# Patient Record
Sex: MALE | Race: WHITE
[De-identification: names, ages, dates, MRNs, and addresses within clinical notes are randomized per-mention and may not be internally consistent; named-entity substitution may affect disease eponyms.]

---

## 2018-01-22 ENCOUNTER — HOSPITAL ENCOUNTER (INPATIENT)
Dept: HOSPITAL 88 - ER | Age: 48
LOS: 18 days | Discharge: HOME | DRG: 330 | End: 2018-02-09
Attending: INTERNAL MEDICINE | Admitting: INTERNAL MEDICINE
Payer: COMMERCIAL

## 2018-01-22 VITALS — SYSTOLIC BLOOD PRESSURE: 150 MMHG | DIASTOLIC BLOOD PRESSURE: 84 MMHG

## 2018-01-22 VITALS — DIASTOLIC BLOOD PRESSURE: 84 MMHG | SYSTOLIC BLOOD PRESSURE: 150 MMHG

## 2018-01-22 VITALS — SYSTOLIC BLOOD PRESSURE: 131 MMHG | DIASTOLIC BLOOD PRESSURE: 81 MMHG

## 2018-01-22 VITALS — BODY MASS INDEX: 24.83 KG/M2 | WEIGHT: 193.5 LBS | HEIGHT: 74 IN

## 2018-01-22 DIAGNOSIS — D62: ICD-10-CM

## 2018-01-22 DIAGNOSIS — E83.51: ICD-10-CM

## 2018-01-22 DIAGNOSIS — R18.8: ICD-10-CM

## 2018-01-22 DIAGNOSIS — C20: Primary | ICD-10-CM

## 2018-01-22 DIAGNOSIS — J90: ICD-10-CM

## 2018-01-22 DIAGNOSIS — K62.4: ICD-10-CM

## 2018-01-22 DIAGNOSIS — E88.09: ICD-10-CM

## 2018-01-22 DIAGNOSIS — I31.3: ICD-10-CM

## 2018-01-22 DIAGNOSIS — Z86.010: ICD-10-CM

## 2018-01-22 DIAGNOSIS — C78.6: ICD-10-CM

## 2018-01-22 DIAGNOSIS — D50.9: ICD-10-CM

## 2018-01-22 DIAGNOSIS — K51.212: ICD-10-CM

## 2018-01-22 LAB
ALBUMIN SERPL-MCNC: 2.5 G/DL (ref 3.5–5)
ALBUMIN/GLOB SERPL: 0.6 {RATIO} (ref 0.8–2)
ALP SERPL-CCNC: 80 IU/L (ref 40–150)
ALT SERPL-CCNC: 25 IU/L (ref 0–55)
ANION GAP SERPL CALC-SCNC: 14.1 MMOL/L (ref 8–16)
BACTERIA URNS QL MICRO: (no result) /HPF
BASOPHILS # BLD AUTO: 0 10*3/UL (ref 0–0.1)
BASOPHILS NFR BLD AUTO: 0.2 % (ref 0–1)
BILIRUB UR QL: NEGATIVE
BUN SERPL-MCNC: 16 MG/DL (ref 7–26)
BUN/CREAT SERPL: 17 (ref 6–25)
CALCIUM SERPL-MCNC: 8.1 MG/DL (ref 8.4–10.2)
CHLORIDE SERPL-SCNC: 99 MMOL/L (ref 98–107)
CLARITY UR: CLEAR
CO2 SERPL-SCNC: 23 MMOL/L (ref 22–29)
COLOR UR: (no result)
DEPRECATED INR PLAS: 1.12
DEPRECATED NEUTROPHILS # BLD AUTO: 8 10*3/UL (ref 2.1–6.9)
DEPRECATED RBC URNS MANUAL-ACNC: (no result) /HPF (ref 0–5)
EGFRCR SERPLBLD CKD-EPI 2021: > 60 ML/MIN (ref 60–?)
EOSINOPHIL # BLD AUTO: 0 10*3/UL (ref 0–0.4)
EOSINOPHIL NFR BLD AUTO: 0.4 % (ref 0–6)
EPI CELLS URNS QL MICRO: (no result) /LPF
ERYTHROCYTE [DISTWIDTH] IN CORD BLOOD: 24.2 % (ref 11.7–14.4)
GLOBULIN PLAS-MCNC: 4 G/DL (ref 2.3–3.5)
GLUCOSE SERPLBLD-MCNC: 104 MG/DL (ref 74–118)
HCT VFR BLD AUTO: 29.4 % (ref 38.2–49.6)
HGB BLD-MCNC: 7.2 G/DL (ref 14–18)
KETONES UR QL STRIP.AUTO: (no result)
LEUKOCYTE ESTERASE UR QL STRIP.AUTO: NEGATIVE
LIPASE SERPL-CCNC: 5 U/L (ref 8–78)
LYMPHOCYTES # BLD: 0.9 10*3/UL (ref 1–3.2)
LYMPHOCYTES NFR BLD AUTO: 8.6 % (ref 18–39.1)
MCH RBC QN AUTO: 15.5 PG (ref 28–32)
MCHC RBC AUTO-ENTMCNC: 24.5 G/DL (ref 31–35)
MCV RBC AUTO: 63.2 FL (ref 81–99)
MONOCYTES # BLD AUTO: 1.5 10*3/UL (ref 0.2–0.8)
MONOCYTES NFR BLD AUTO: 13.9 % (ref 4.4–11.3)
NEUTS SEG NFR BLD AUTO: 76.2 % (ref 38.7–80)
NITRITE UR QL STRIP.AUTO: NEGATIVE
PLATELET # BLD AUTO: 569 X10E3/UL (ref 140–360)
POTASSIUM SERPL-SCNC: 4.1 MMOL/L (ref 3.5–5.1)
PROT UR QL STRIP.AUTO: NEGATIVE
PROTHROMBIN TIME: 15 SECONDS (ref 11.9–14.5)
RBC # BLD AUTO: 4.65 X10E6/UL (ref 4.3–5.7)
SODIUM SERPL-SCNC: 132 MMOL/L (ref 136–145)
SP GR UR STRIP: 1.01 (ref 1.01–1.02)
UROBILINOGEN UR STRIP-MCNC: 0.2 MG/DL (ref 0.2–1)
WBC #/AREA URNS HPF: (no result) /HPF (ref 0–5)

## 2018-01-22 PROCEDURE — 80048 BASIC METABOLIC PNL TOTAL CA: CPT

## 2018-01-22 PROCEDURE — 74177 CT ABD & PELVIS W/CONTRAST: CPT

## 2018-01-22 PROCEDURE — 81001 URINALYSIS AUTO W/SCOPE: CPT

## 2018-01-22 PROCEDURE — 80053 COMPREHEN METABOLIC PANEL: CPT

## 2018-01-22 PROCEDURE — 83880 ASSAY OF NATRIURETIC PEPTIDE: CPT

## 2018-01-22 PROCEDURE — 74018 RADEX ABDOMEN 1 VIEW: CPT

## 2018-01-22 PROCEDURE — 86850 RBC ANTIBODY SCREEN: CPT

## 2018-01-22 PROCEDURE — 84466 ASSAY OF TRANSFERRIN: CPT

## 2018-01-22 PROCEDURE — 87045 FECES CULTURE AEROBIC BACT: CPT

## 2018-01-22 PROCEDURE — 85651 RBC SED RATE NONAUTOMATED: CPT

## 2018-01-22 PROCEDURE — 83540 ASSAY OF IRON: CPT

## 2018-01-22 PROCEDURE — 86920 COMPATIBILITY TEST SPIN: CPT

## 2018-01-22 PROCEDURE — 87086 URINE CULTURE/COLONY COUNT: CPT

## 2018-01-22 PROCEDURE — 88305 TISSUE EXAM BY PATHOLOGIST: CPT

## 2018-01-22 PROCEDURE — 99284 EMERGENCY DEPT VISIT MOD MDM: CPT

## 2018-01-22 PROCEDURE — 36415 COLL VENOUS BLD VENIPUNCTURE: CPT

## 2018-01-22 PROCEDURE — 85610 PROTHROMBIN TIME: CPT

## 2018-01-22 PROCEDURE — 85025 COMPLETE CBC W/AUTO DIFF WBC: CPT

## 2018-01-22 PROCEDURE — 82378 CARCINOEMBRYONIC ANTIGEN: CPT

## 2018-01-22 PROCEDURE — 83690 ASSAY OF LIPASE: CPT

## 2018-01-22 PROCEDURE — 45330 DIAGNOSTIC SIGMOIDOSCOPY: CPT

## 2018-01-22 PROCEDURE — 88307 TISSUE EXAM BY PATHOLOGIST: CPT

## 2018-01-22 PROCEDURE — 87493 C DIFF AMPLIFIED PROBE: CPT

## 2018-01-22 PROCEDURE — 88309 TISSUE EXAM BY PATHOLOGIST: CPT

## 2018-01-22 PROCEDURE — 77001 FLUOROGUIDE FOR VEIN DEVICE: CPT

## 2018-01-22 PROCEDURE — 85018 HEMOGLOBIN: CPT

## 2018-01-22 PROCEDURE — 88342 IMHCHEM/IMCYTCHM 1ST ANTB: CPT

## 2018-01-22 PROCEDURE — 86140 C-REACTIVE PROTEIN: CPT

## 2018-01-22 PROCEDURE — 36430 TRANSFUSION BLD/BLD COMPNT: CPT

## 2018-01-22 PROCEDURE — 96361 HYDRATE IV INFUSION ADD-ON: CPT

## 2018-01-22 PROCEDURE — 85014 HEMATOCRIT: CPT

## 2018-01-22 PROCEDURE — 82728 ASSAY OF FERRITIN: CPT

## 2018-01-22 PROCEDURE — 86900 BLOOD TYPING SEROLOGIC ABO: CPT

## 2018-01-22 RX ADMIN — METRONIDAZOLE SCH MLS/HR: 500 INJECTION, SOLUTION INTRAVENOUS at 17:52

## 2018-01-22 RX ADMIN — SODIUM CHLORIDE SCH MLS/HR: 9 INJECTION, SOLUTION INTRAVENOUS at 17:52

## 2018-01-22 RX ADMIN — TAZOBACTAM SODIUM AND PIPERACILLIN SODIUM SCH MLS/HR: 375; 3 INJECTION, SOLUTION INTRAVENOUS at 21:50

## 2018-01-22 RX ADMIN — METHYLPREDNISOLONE SODIUM SUCCINATE SCH MG: 40 INJECTION, POWDER, LYOPHILIZED, FOR SOLUTION INTRAMUSCULAR; INTRAVENOUS at 17:52

## 2018-01-22 RX ADMIN — CIPROFLOXACIN SCH MLS/HR: 2 INJECTION, SOLUTION INTRAVENOUS at 18:36

## 2018-01-22 NOTE — DIAGNOSTIC IMAGING REPORT
EXAM: CT Abdomen and Pelvis WITH contrast  

INDICATION:      

\S\abdominal pain, diverticulitis

\S\[id number]

\S\1203

\S\N 

COMPARISON: None available.

TECHNIQUE: Abdomen and pelvis were scanned utilizing a multidetector helical

scanner from the lung base to the pubic symphysis after administration of IV

contrast. Coronal and sagittal reformations were obtained. Routine protocol was

performed. Scan was performed when during portal venous phase.    

     IV CONTRAST: 100 mL of Isovue-370

     ORAL CONTRAST: Gastroview

            

COMPLICATIONS: None



RADIATION DOSE:

     Total DLP: 554.32 mGy*cm

     Estimated effective dose: (DLP x 0.015 x size factor) mSv

     CTDIvol has been reviewed. It is below the limits set by the Radiation

Protocol Committee (RPC).



FINDINGS:



LINES and TUBES: None.



LOWER THORAX:  Bibasilar atelectasis. Trace pericardial fluid.



HEPATOBILIARY: 0.9 cm left hepatic lobe cyst.  No focal hepatic lesions. No

biliary ductal dilation. 



GALLBLADDER: No radio-opaque stones or sludge.  No wall thickening.



SPLEEN: Splenomegaly. 



PANCREAS: No focal masses or ductal dilatation.  



ADRENALS: No adrenal nodules    



KIDNEYS/URETERS: Kidneys enhance symmetrically.  No hydronephrosis. No cystic

or solid mass lesions.  No stones.



GI TRACT: Significant distention of the entire colon with loss of haustral

markings, measuring up to 8.3 cm with abrupt narrowing at the high rectal area

(series 2, image 80). The remainder of the rectum also shows wall thickening.

Small hiatal hernia with contrast in distal esophagus, suggestive of

gastroesophageal reflux.





PELVIC ORGANS/BLADDER: Unremarkable.



LYMPH NODES: Prominent retroperitoneal lymph nodes. For example 1.1 cm and 1.2

cm left para-aortic lymph nodes (series 2, images 43 and 45). There are also

enlarged peripancreatic lymph nodes, measuring up to 1.1 cm (series 2, image

35).  Multiple mesorectal/presacral lymph nodes, the largest measuring 1.3 cm

(series 2, image 75 and series 301 image 73).



VESSELS: Unremarkable.



PERITONEUM / RETROPERITONEUM: No free air. Trace abdominal ascites.



BONES: Bilateral L5 pars defects with grade 1 L5-S1 spondylolisthesis.



SOFT TISSUES: Unremarkable.            



IMPRESSION: 

1.  Significant distention of entire colon with narrowing at the high rectal

area. Differentials include toxic megacolon (considering history of

inflammatory bowel disease), colonic ileus, and partial obstruction from rectal

mass. There is rectal wall thickening and suspected mesorectal and

retroperitoneal lymph nodes.

2.  Trace abdominal ascites and pericardial effusion.



Findings discussed with Dr. Pittman at 12:50 PM, on 1/22/2018.



Signed by: Dr. Brock Dunn MD on 1/22/2018 12:53 PM

## 2018-01-22 NOTE — CONSULTATION
DATE OF CONSULTATION:  January 22, 2018 



GASTROENTEROLOGY CONSULTATION 



REFERRING PHYSICIAN:  Dr. Pittman 



REASON FOR CONSULTATION:  Colitis, abnormal CT of colon.  



HISTORY OF PRESENT ILLNESS:  Mr. Ruiz is a very pleasant 47-year-old man 

with longstanding history of ulcerative colitis.  He previously followed 

with Dr. Corral and was last seen in 2014.  At that time, he had a 

colonoscopy, which could not pass roughly 50 cm due to a stricture.  

Biopsies were concerning for possible high-grade dysplasia.  He was 

referred for colectomy.  He says he saw oncologist, as well as a colorectal 

surgeon and they recommended monitoring as a possibility per his report.  Since 
then, he 

has been off of medications and has not followed up with his 

gastroenterologist.  He has been managing just by diet.  He reports his 

baseline is usually 3 to 4 stools daily, but he has never had blood in his 

stool.  His abdomen became swollen for 3 days the 1st week of December, but 

resumed its normal habitus spontaneously.  He  was doing well per his 

report until roughly a week ago.  Last week, his abdomen became swollen.  

He had developed some lower abdominal pain, which was mild roughly 3 to 4 

out of 10 on the pain scale.  He says he did not feel pain acutely.  He has 

been eating very little and has been having fewer bowel movements well.  

Stools are liquid.  Still passes gas.  He thinks he has lost weight maybe about 
10 lbs in the 

past week.  He denies any fevers, chills or sweats.  He has not been having 

any significant diarrhea.  He, otherwise, has been in his usual self 

without any extra intestinal manifestations of inflammatory bowel disease.  





PAST MEDICAL HISTORY:  Ulcerative colitis diagnosed when he was 17.  He 

believed at first it was limited to the left side and then, later became 

pancolitis.  He has been on prednisone, Asacol, and Rowasa enemas in the past, 

but nothing since 2014.  He is near sighted.



SURGICAL HISTORY:  Includes cataract in the left side and traumatic detached 
retina 

on the right.



SOCIAL HISTORY:  No alcohol, tobacco or illicit substance use.  He is 

employed as a  in oil and gas.



FAMILY HISTORY:  Positive for breast cancer in his mother and as well as 

some sort of digestive issue in a great aunt. 



MEDICATION AND ALLERGIES:  Reviewed.  Please see MAR medication 

reconciliation form.  



REVIEW OF SYSTEMS:  Twelve-system review is positive for that mentioned in 

HPI, otherwise unremarkable.  



PHYSICAL EXAM  

GENERAL:  He is pleasant, alert, oriented, in no acute distress. 

HEENT:  Pupils equal, round, reactive to light. 

NECK:  Supple. 

LUNGS:  Clear. 

CARDIOVASCULAR:  S1 S2. 

ABDOMEN:  Soft.  Mildly distended.  He has some high-pitched tinkling 

sounds.  He is a little bit tender in the lower abdomen, particularly on 

the left lower quadrant, but there is no rebound, guarding or mass. 

EXTREMITIES:  No clubbing, cyanosis or edema.  

PSYCH:  Calm, cooperative. 

NEUROLOGIC:  Nonfocal.

HEM/ONC:  No bruising or adenopathy.  



Electronic health records reviewed for laboratory and radiologic studies as 

well as history. 



ASSESSMENT

1. It appears to be a proximal rectal or distal sigmoid strictures, 

likely the one that was encountered on previous colonoscopy.  Question 

is how much of this is related to inflammatory status, fibrostenotic 

scar tissue or even underlying malignancy given the history of 

high-grade dysplasia.  His presentation does not seem to be that of 

Clostridium difficile or toxic megacolon.  It is probably a more chronic 
progressive obstruction.

At the current time, will go 

ahead and place him on antibiotics, as well as steroids to decrease any 

inflammation that may be related and responsive from the ulcerative 

colitis.  He will need a colonoscopy.  If he can tolerate a slow bowel 

prep over a couple of days would be ideal.  Will plan for Dr. Ortiz 

to follow with us.  Additionally, will go ahead and check inflammatory 

markers and stool studies.  If he is not passing sufficient stools and gas

will need to be NPO with just enema prep.  

2. Anemia, microcytic:  I suspect he has iron-deficiency anemia.  Will 

check iron studies.  He may need IV replacement.  



Thank you very much for asking me to see Mr. Ruiz.  Any questions or 

concerns, please do not hesitate to contact me.  Will follow with you.









DD:  01/22/2018 16:53

DT:  01/22/2018 20:29

Job#:  [id number] CQ







MTDD

## 2018-01-23 VITALS — DIASTOLIC BLOOD PRESSURE: 74 MMHG | SYSTOLIC BLOOD PRESSURE: 138 MMHG

## 2018-01-23 VITALS — SYSTOLIC BLOOD PRESSURE: 141 MMHG | DIASTOLIC BLOOD PRESSURE: 76 MMHG

## 2018-01-23 VITALS — DIASTOLIC BLOOD PRESSURE: 70 MMHG | SYSTOLIC BLOOD PRESSURE: 143 MMHG

## 2018-01-23 VITALS — DIASTOLIC BLOOD PRESSURE: 79 MMHG | SYSTOLIC BLOOD PRESSURE: 148 MMHG

## 2018-01-23 VITALS — DIASTOLIC BLOOD PRESSURE: 69 MMHG | SYSTOLIC BLOOD PRESSURE: 131 MMHG

## 2018-01-23 VITALS — SYSTOLIC BLOOD PRESSURE: 156 MMHG | DIASTOLIC BLOOD PRESSURE: 82 MMHG

## 2018-01-23 VITALS — DIASTOLIC BLOOD PRESSURE: 74 MMHG | SYSTOLIC BLOOD PRESSURE: 151 MMHG

## 2018-01-23 LAB
ALBUMIN SERPL-MCNC: 1.8 G/DL (ref 3.5–5)
ALBUMIN/GLOB SERPL: 0.6 {RATIO} (ref 0.8–2)
ALP SERPL-CCNC: 69 IU/L (ref 40–150)
ALT SERPL-CCNC: 24 IU/L (ref 0–55)
ANION GAP SERPL CALC-SCNC: 13.2 MMOL/L (ref 8–16)
ANISOCYTOSIS BLD QL SMEAR: (no result)
BASOPHILS # BLD AUTO: 0 10*3/UL (ref 0–0.1)
BASOPHILS NFR BLD AUTO: 0.2 % (ref 0–1)
BUN SERPL-MCNC: 14 MG/DL (ref 7–26)
BUN/CREAT SERPL: 18 (ref 6–25)
CALCIUM SERPL-MCNC: 7.5 MG/DL (ref 8.4–10.2)
CHLORIDE SERPL-SCNC: 104 MMOL/L (ref 98–107)
CO2 SERPL-SCNC: 21 MMOL/L (ref 22–29)
DEPRECATED NEUTROPHILS # BLD AUTO: 4.3 10*3/UL (ref 2.1–6.9)
EGFRCR SERPLBLD CKD-EPI 2021: > 60 ML/MIN (ref 60–?)
ELLIPTOCYTES BLD QL SMEAR: SLIGHT
EOSINOPHIL # BLD AUTO: 0 10*3/UL (ref 0–0.4)
EOSINOPHIL NFR BLD AUTO: 0.5 % (ref 0–6)
EOSINOPHIL NFR BLD MANUAL: 1 % (ref 0–7)
ERYTHROCYTE [DISTWIDTH] IN CORD BLOOD: 23.3 % (ref 11.7–14.4)
FERRITIN SERPL-MCNC: 7.98 NG/ML (ref 21.81–274.66)
GLOBULIN PLAS-MCNC: 3.2 G/DL (ref 2.3–3.5)
GLUCOSE SERPLBLD-MCNC: 103 MG/DL (ref 74–118)
HCT VFR BLD AUTO: 22.5 % (ref 38.2–49.6)
HCT VFR BLD AUTO: 23 % (ref 38.2–49.6)
HGB BLD-MCNC: 5.6 G/DL (ref 14–18)
HGB BLD-MCNC: 5.7 G/DL (ref 14–18)
HYPOCHROMIA BLD QL SMEAR: (no result)
IRON SATN MFR SERPL: 4 % (ref 15–50)
IRON SERPL-MCNC: 8 UG/DL (ref 65–175)
LYMPHOCYTES # BLD: 0.8 10*3/UL (ref 1–3.2)
LYMPHOCYTES NFR BLD AUTO: 12.7 % (ref 18–39.1)
LYMPHOCYTES NFR BLD MANUAL: 9 % (ref 19–48)
MCH RBC QN AUTO: 15.6 PG (ref 28–32)
MCHC RBC AUTO-ENTMCNC: 24.9 G/DL (ref 31–35)
MCV RBC AUTO: 62.8 FL (ref 81–99)
MONOCYTES # BLD AUTO: 1.3 10*3/UL (ref 0.2–0.8)
MONOCYTES NFR BLD AUTO: 19.6 % (ref 4.4–11.3)
MONOCYTES NFR BLD MANUAL: 17 % (ref 3.4–9)
NEUTS BAND NFR BLD MANUAL: 8 %
NEUTS SEG NFR BLD AUTO: 66.5 % (ref 38.7–80)
NEUTS SEG NFR BLD MANUAL: 63 % (ref 40–74)
PLAT MORPH BLD: (no result)
PLATELET # BLD AUTO: 376 X10E3/UL (ref 140–360)
PLATELET # BLD EST: ADEQUATE 10*3/UL
POIKILOCYTOSIS BLD QL SMEAR: SLIGHT
POTASSIUM SERPL-SCNC: 4.2 MMOL/L (ref 3.5–5.1)
RBC # BLD AUTO: 3.58 X10E6/UL (ref 4.3–5.7)
RBC MORPH BLD: (no result)
SODIUM SERPL-SCNC: 134 MMOL/L (ref 136–145)
TIBC SERPL-MCNC: 179 UG/DL (ref 261–478)
TRANSFERRIN SERPL-MCNC: 128 MG/DL (ref 174–364)

## 2018-01-23 PROCEDURE — 30233N1 TRANSFUSION OF NONAUTOLOGOUS RED BLOOD CELLS INTO PERIPHERAL VEIN, PERCUTANEOUS APPROACH: ICD-10-PCS | Performed by: SURGERY

## 2018-01-23 RX ADMIN — SODIUM CHLORIDE SCH MLS/HR: 9 INJECTION, SOLUTION INTRAVENOUS at 06:05

## 2018-01-23 RX ADMIN — SODIUM CHLORIDE SCH MLS/HR: 9 INJECTION, SOLUTION INTRAVENOUS at 22:05

## 2018-01-23 RX ADMIN — CIPROFLOXACIN SCH MLS/HR: 2 INJECTION, SOLUTION INTRAVENOUS at 04:37

## 2018-01-23 RX ADMIN — METRONIDAZOLE SCH MLS/HR: 500 INJECTION, SOLUTION INTRAVENOUS at 00:40

## 2018-01-23 RX ADMIN — METRONIDAZOLE SCH MLS/HR: 500 INJECTION, SOLUTION INTRAVENOUS at 17:02

## 2018-01-23 RX ADMIN — CIPROFLOXACIN SCH MLS/HR: 2 INJECTION, SOLUTION INTRAVENOUS at 17:15

## 2018-01-23 RX ADMIN — METHYLPREDNISOLONE SODIUM SUCCINATE SCH MG: 40 INJECTION, POWDER, LYOPHILIZED, FOR SOLUTION INTRAMUSCULAR; INTRAVENOUS at 00:40

## 2018-01-23 RX ADMIN — METHYLPREDNISOLONE SODIUM SUCCINATE SCH MG: 40 INJECTION, POWDER, LYOPHILIZED, FOR SOLUTION INTRAMUSCULAR; INTRAVENOUS at 17:55

## 2018-01-23 RX ADMIN — SODIUM CHLORIDE SCH MLS/HR: 9 INJECTION, SOLUTION INTRAVENOUS at 03:37

## 2018-01-23 RX ADMIN — METHYLPREDNISOLONE SODIUM SUCCINATE SCH MG: 40 INJECTION, POWDER, LYOPHILIZED, FOR SOLUTION INTRAMUSCULAR; INTRAVENOUS at 06:12

## 2018-01-23 RX ADMIN — TAZOBACTAM SODIUM AND PIPERACILLIN SODIUM SCH MLS/HR: 375; 3 INJECTION, SOLUTION INTRAVENOUS at 06:12

## 2018-01-23 RX ADMIN — METHYLPREDNISOLONE SODIUM SUCCINATE SCH MG: 40 INJECTION, POWDER, LYOPHILIZED, FOR SOLUTION INTRAMUSCULAR; INTRAVENOUS at 23:45

## 2018-01-23 RX ADMIN — METRONIDAZOLE SCH MLS/HR: 500 INJECTION, SOLUTION INTRAVENOUS at 08:23

## 2018-01-23 RX ADMIN — TAZOBACTAM SODIUM AND PIPERACILLIN SODIUM SCH MLS/HR: 375; 3 INJECTION, SOLUTION INTRAVENOUS at 21:37

## 2018-01-23 RX ADMIN — METHYLPREDNISOLONE SODIUM SUCCINATE SCH MG: 40 INJECTION, POWDER, LYOPHILIZED, FOR SOLUTION INTRAMUSCULAR; INTRAVENOUS at 13:13

## 2018-01-23 RX ADMIN — SODIUM CHLORIDE SCH MLS/HR: 9 INJECTION, SOLUTION INTRAVENOUS at 14:39

## 2018-01-23 RX ADMIN — TAZOBACTAM SODIUM AND PIPERACILLIN SODIUM SCH MLS/HR: 375; 3 INJECTION, SOLUTION INTRAVENOUS at 13:13

## 2018-01-23 NOTE — DIAGNOSTIC IMAGING REPORT
PROCEDURE:X-RAY ABDOMEN - KUB

 

COMPARISON:CT abdomen and pelvis 1/22/2018.

 

INDICATIONS:ABDOMINAL PAIN

 

FINDINGS:

Dilated fluid filled loops of colon. No air is present in the rectum. 

There are no calcifications projected over the renal shadows, expected 

course of the ureters or bladder. There are no acute osseous 

abnormalities. The lung bases are clear.

 

CONCLUSION:

Dilated fluid filled loops of colon may represent a distal colon 

obstruction.

 

Dictated by:  Telly Calvillo M.D. on 1/23/2018 at 7:53     

Electronically approved by:  Telly Calvillo M.D. on 1/23/2018 at 7:53

## 2018-01-24 VITALS — DIASTOLIC BLOOD PRESSURE: 75 MMHG | SYSTOLIC BLOOD PRESSURE: 139 MMHG

## 2018-01-24 VITALS — DIASTOLIC BLOOD PRESSURE: 75 MMHG | SYSTOLIC BLOOD PRESSURE: 151 MMHG

## 2018-01-24 VITALS — DIASTOLIC BLOOD PRESSURE: 84 MMHG | SYSTOLIC BLOOD PRESSURE: 149 MMHG

## 2018-01-24 VITALS — SYSTOLIC BLOOD PRESSURE: 137 MMHG | DIASTOLIC BLOOD PRESSURE: 78 MMHG

## 2018-01-24 VITALS — SYSTOLIC BLOOD PRESSURE: 142 MMHG | DIASTOLIC BLOOD PRESSURE: 73 MMHG

## 2018-01-24 VITALS — DIASTOLIC BLOOD PRESSURE: 78 MMHG | SYSTOLIC BLOOD PRESSURE: 147 MMHG

## 2018-01-24 LAB
ANION GAP SERPL CALC-SCNC: 11.6 MMOL/L (ref 8–16)
ANISOCYTOSIS BLD QL SMEAR: (no result)
BASOPHILS # BLD AUTO: 0 10*3/UL (ref 0–0.1)
BASOPHILS NFR BLD AUTO: 0.2 % (ref 0–1)
BUN SERPL-MCNC: 13 MG/DL (ref 7–26)
BUN/CREAT SERPL: 16 (ref 6–25)
CALCIUM SERPL-MCNC: 8 MG/DL (ref 8.4–10.2)
CHLORIDE SERPL-SCNC: 107 MMOL/L (ref 98–107)
CO2 SERPL-SCNC: 20 MMOL/L (ref 22–29)
DEPRECATED NEUTROPHILS # BLD AUTO: 5 10*3/UL (ref 2.1–6.9)
EGFRCR SERPLBLD CKD-EPI 2021: > 60 ML/MIN (ref 60–?)
ELLIPTOCYTES BLD QL SMEAR: SLIGHT
EOSINOPHIL # BLD AUTO: 0 10*3/UL (ref 0–0.4)
EOSINOPHIL NFR BLD AUTO: 0.3 % (ref 0–6)
ERYTHROCYTE [DISTWIDTH] IN CORD BLOOD: 25.8 % (ref 11.7–14.4)
GLUCOSE SERPLBLD-MCNC: 113 MG/DL (ref 74–118)
HCT VFR BLD AUTO: 24.7 % (ref 38.2–49.6)
HCT VFR BLD AUTO: 25.5 % (ref 38.2–49.6)
HCT VFR BLD AUTO: 29.3 % (ref 38.2–49.6)
HGB BLD-MCNC: 6.5 G/DL (ref 14–18)
HGB BLD-MCNC: 6.6 G/DL (ref 14–18)
HGB BLD-MCNC: 7.7 G/DL (ref 14–18)
HOWELL-JOLLY BOD BLD QL SMEAR: (no result)
HYPOCHROMIA BLD QL SMEAR: (no result)
LYMPHOCYTES # BLD: 0.6 10*3/UL (ref 1–3.2)
LYMPHOCYTES NFR BLD AUTO: 8.6 % (ref 18–39.1)
MCH RBC QN AUTO: 17.3 PG (ref 28–32)
MCHC RBC AUTO-ENTMCNC: 25.9 G/DL (ref 31–35)
MCV RBC AUTO: 66.9 FL (ref 81–99)
MONOCYTES # BLD AUTO: 0.8 10*3/UL (ref 0.2–0.8)
MONOCYTES NFR BLD AUTO: 12.6 % (ref 4.4–11.3)
NEUTS SEG NFR BLD AUTO: 77.1 % (ref 38.7–80)
PLAT MORPH BLD: (no result)
PLATELET # BLD AUTO: 342 X10E3/UL (ref 140–360)
PLATELET # BLD EST: ADEQUATE 10*3/UL
POTASSIUM SERPL-SCNC: 4.6 MMOL/L (ref 3.5–5.1)
RBC # BLD AUTO: 3.81 X10E6/UL (ref 4.3–5.7)
RBC MORPH BLD: (no result)
SODIUM SERPL-SCNC: 134 MMOL/L (ref 136–145)

## 2018-01-24 PROCEDURE — 0DBF8ZX EXCISION OF RIGHT LARGE INTESTINE, VIA NATURAL OR ARTIFICIAL OPENING ENDOSCOPIC, DIAGNOSTIC: ICD-10-PCS | Performed by: SURGERY

## 2018-01-24 PROCEDURE — 0DBP8ZX EXCISION OF RECTUM, VIA NATURAL OR ARTIFICIAL OPENING ENDOSCOPIC, DIAGNOSTIC: ICD-10-PCS

## 2018-01-24 RX ADMIN — METHYLPREDNISOLONE SODIUM SUCCINATE SCH MG: 40 INJECTION, POWDER, LYOPHILIZED, FOR SOLUTION INTRAMUSCULAR; INTRAVENOUS at 05:24

## 2018-01-24 RX ADMIN — CIPROFLOXACIN SCH MLS/HR: 2 INJECTION, SOLUTION INTRAVENOUS at 16:40

## 2018-01-24 RX ADMIN — METRONIDAZOLE SCH MLS/HR: 500 INJECTION, SOLUTION INTRAVENOUS at 16:40

## 2018-01-24 RX ADMIN — METRONIDAZOLE SCH MLS/HR: 500 INJECTION, SOLUTION INTRAVENOUS at 09:56

## 2018-01-24 RX ADMIN — METHYLPREDNISOLONE SODIUM SUCCINATE SCH MG: 40 INJECTION, POWDER, LYOPHILIZED, FOR SOLUTION INTRAMUSCULAR; INTRAVENOUS at 18:38

## 2018-01-24 RX ADMIN — TAZOBACTAM SODIUM AND PIPERACILLIN SODIUM SCH MLS/HR: 375; 3 INJECTION, SOLUTION INTRAVENOUS at 14:00

## 2018-01-24 RX ADMIN — CIPROFLOXACIN SCH MLS/HR: 2 INJECTION, SOLUTION INTRAVENOUS at 05:15

## 2018-01-24 RX ADMIN — TAZOBACTAM SODIUM AND PIPERACILLIN SODIUM SCH MLS/HR: 375; 3 INJECTION, SOLUTION INTRAVENOUS at 06:09

## 2018-01-24 RX ADMIN — METRONIDAZOLE SCH MLS/HR: 500 INJECTION, SOLUTION INTRAVENOUS at 00:06

## 2018-01-24 RX ADMIN — TAZOBACTAM SODIUM AND PIPERACILLIN SODIUM SCH MLS/HR: 375; 3 INJECTION, SOLUTION INTRAVENOUS at 21:02

## 2018-01-24 RX ADMIN — METHYLPREDNISOLONE SODIUM SUCCINATE SCH MG: 40 INJECTION, POWDER, LYOPHILIZED, FOR SOLUTION INTRAMUSCULAR; INTRAVENOUS at 12:00

## 2018-01-24 RX ADMIN — SODIUM CHLORIDE SCH MLS/HR: 9 INJECTION, SOLUTION INTRAVENOUS at 06:05

## 2018-01-24 RX ADMIN — SODIUM CHLORIDE SCH MLS/HR: 9 INJECTION, SOLUTION INTRAVENOUS at 14:05

## 2018-01-24 NOTE — CONSULTATION
DATE OF CONSULTATION:  January 23, 2018 



CONSULTATION TO:  Dr. Bruno Corral.



HISTORY OF PRESENT ILLNESS:  Mr. Ruiz is a 47-year-old white male who has 

been referred to me for evaluation of anemia.  The patient had presented 

with abdominal pain and distention, subsequently was seen by Dr. Potter. 

 Was found to be very anemic.  History of ulcerative colitis.  Dr. Peter Corral had followed him, the last was in 2014.  The patient subsequently 

was sent to me on 07/24/2014.  The patient was found to have an adenoma 

high-grade dysplasia when he had a colonoscopy on 06/20/2014.  The findings 

were also of ulcerative colitis, descending colon stricture, large 

pseudopolyps, ulcerative left colon, and ulcerative proctitis.  The biopsy 

were obtained.  The patient at that time was treated with Asacol, Canasa, 

and Levaquin.  The patient at that time had rectal bleed and diarrhea.  

Subsequently, all the records provided by Dr. Peter Corral were reviewed. 

 The patient was suggested "agree completely with the colorectal surgeon 

that a total colectomy is warranted in a setting of ulcerative colitis 

since age 17."  Psychologically, it is hard for him to accept the drastic 

change in his lifestyle.  He wants a surveillance colonoscopy in 3 months 

"to wrap my head around what is coming."  Prolonged discussion was carried 

out.  Encouraged him to read more above the disease process to understand 

the recommendations of the physicians.  The patient was suggested to see me 

back after seeing the surgeon in 6 months.  However the patient never came 

back.



REVIEW OF SYSTEMS

HEENT:  Normal. 

CARDIAC:  Normal. 

RESPIRATORY:  Normal.

GI:  History of ulcerative colitis, history of a polyp with high-grade 

dysplasia discovered in 2014, lost for followup from 07/24/2014 until he 

lands up here. 

:  Normal. 

MUSCULOSKELETAL:  Normal. 

SKIN:  Normal.

BREASTS:  Normal.

NEUROENDOCRINE:  Normal.

HEMATOLOGY:  The patient is very anemic at this time. 

 

PHYSICAL EXAMINATION

GENERAL:  Moderately built male, very anemic.

NECK:  No palpable adenopathy. 

HEART:  Within normal limits. 

LUNGS:  Clear. 

ABDOMEN:  Distended. 

RECTAL:  Exam deferred. 

CENTRAL NERVOUS SYSTEM:  Essentially normal. 

EXTREMITIES:  Essentially normal. 



LABORATORY DATA:  Lab investigations of interest which showed a hemoglobin 

of 7.2, hematocrit 29.4, white count 10,500, platelets were reported high 

at 559,000, MCV 63.2, MCHC 24.5, and RDW high at 24.2.  Chemistry shows a 

sodium of 134, potassium 4.2, chloride 104, CO2 21, BUN 14, creatinine 0.7, 

calcium low at 7.5, iron low at 8, iron binding capacity low at 179, 

saturation low at 14, transferrin 128 low, and ferritin low at 7.98.  Total 

protein is 5.  Albumin low at 1.8.  Globulin 3.2.



IMAGING:  The patient had imaging CAT scan of the abdomen and pelvis is not 

much different from the one I have in my records from 07/22/2014.  The 

patient has significant distention of the entire colon with narrowing the 

high rectal area.  It is contemplated that this could be because off 

partial obstruction from a rectal mass, rectal thickening, suspected 

mesorectal and retroperitoneal lymph nodes, trace abdominal ascites, and 

pericardial effusion.  There was an abrupt narrowing at the high rectal 

area according to Dr. Brock Dunn, this is dated 01/22/2018.



IMPRESSION

1. History of ulcerative colitis.

2. Distention of colon, possible stricture, rule out malignancy.

3. History of dysplastic changes in colon in the past.

4. Retroperitoneal paraaortic peripancreatic mesorectal presacral 

adenopathy.

5. Rectal wall thickening.

6. Abdominal ascites.

7. Pericardial effusion.

8. Iron deficiency anemia.

9. Hypoalbuminemia.

10. Hypocalcemia. 



PLAN, COMMENTS, AND SUGGESTIONS:  Suggest colonoscopy.  Suggest biopsy.  

Suggest blood .  Suggest INFeD, surgical consultation, and tumor markers.







DD:  01/24/2018 08:19

DT:  01/24/2018 09:39

Job#:  [id number] SAK



cc:Dr. Peter Ortiz

## 2018-01-25 VITALS — DIASTOLIC BLOOD PRESSURE: 83 MMHG | SYSTOLIC BLOOD PRESSURE: 149 MMHG

## 2018-01-25 VITALS — SYSTOLIC BLOOD PRESSURE: 146 MMHG | DIASTOLIC BLOOD PRESSURE: 85 MMHG

## 2018-01-25 VITALS — DIASTOLIC BLOOD PRESSURE: 78 MMHG | SYSTOLIC BLOOD PRESSURE: 145 MMHG

## 2018-01-25 VITALS — DIASTOLIC BLOOD PRESSURE: 76 MMHG | SYSTOLIC BLOOD PRESSURE: 135 MMHG

## 2018-01-25 VITALS — DIASTOLIC BLOOD PRESSURE: 80 MMHG | SYSTOLIC BLOOD PRESSURE: 135 MMHG

## 2018-01-25 VITALS — DIASTOLIC BLOOD PRESSURE: 81 MMHG | SYSTOLIC BLOOD PRESSURE: 145 MMHG

## 2018-01-25 VITALS — SYSTOLIC BLOOD PRESSURE: 145 MMHG | DIASTOLIC BLOOD PRESSURE: 81 MMHG

## 2018-01-25 LAB
ANISOCYTOSIS BLD QL SMEAR: (no result)
BASOPHILS # BLD AUTO: 0 10*3/UL (ref 0–0.1)
BASOPHILS NFR BLD AUTO: 0.1 % (ref 0–1)
DEPRECATED NEUTROPHILS # BLD AUTO: 5.3 10*3/UL (ref 2.1–6.9)
ELLIPTOCYTES BLD QL SMEAR: (no result)
EOSINOPHIL # BLD AUTO: 0 10*3/UL (ref 0–0.4)
EOSINOPHIL NFR BLD AUTO: 0.4 % (ref 0–6)
ERYTHROCYTE [DISTWIDTH] IN CORD BLOOD: 27.1 % (ref 11.7–14.4)
HCT VFR BLD AUTO: 28.5 % (ref 38.2–49.6)
HGB BLD-MCNC: 7.6 G/DL (ref 14–18)
HYPOCHROMIA BLD QL SMEAR: (no result)
LYMPHOCYTES # BLD: 0.6 10*3/UL (ref 1–3.2)
LYMPHOCYTES NFR BLD AUTO: 9.2 % (ref 18–39.1)
MCH RBC QN AUTO: 18.4 PG (ref 28–32)
MCHC RBC AUTO-ENTMCNC: 26.7 G/DL (ref 31–35)
MCV RBC AUTO: 69 FL (ref 81–99)
MONOCYTES # BLD AUTO: 0.9 10*3/UL (ref 0.2–0.8)
MONOCYTES NFR BLD AUTO: 12.5 % (ref 4.4–11.3)
NEUTS SEG NFR BLD AUTO: 76.6 % (ref 38.7–80)
PLAT MORPH BLD: (no result)
PLATELET # BLD AUTO: 318 X10E3/UL (ref 140–360)
PLATELET # BLD EST: ADEQUATE 10*3/UL
RBC # BLD AUTO: 4.13 X10E6/UL (ref 4.3–5.7)
RBC MORPH BLD: (no result)

## 2018-01-25 RX ADMIN — METRONIDAZOLE SCH MLS/HR: 500 INJECTION, SOLUTION INTRAVENOUS at 00:27

## 2018-01-25 RX ADMIN — TAZOBACTAM SODIUM AND PIPERACILLIN SODIUM SCH MLS/HR: 375; 3 INJECTION, SOLUTION INTRAVENOUS at 21:29

## 2018-01-25 RX ADMIN — SODIUM CHLORIDE SCH MLS/HR: 9 INJECTION, SOLUTION INTRAVENOUS at 05:53

## 2018-01-25 RX ADMIN — SODIUM CHLORIDE SCH MLS/HR: 9 INJECTION, SOLUTION INTRAVENOUS at 14:05

## 2018-01-25 RX ADMIN — CIPROFLOXACIN SCH MLS/HR: 2 INJECTION, SOLUTION INTRAVENOUS at 16:25

## 2018-01-25 RX ADMIN — METHYLPREDNISOLONE SODIUM SUCCINATE SCH MG: 40 INJECTION, POWDER, LYOPHILIZED, FOR SOLUTION INTRAMUSCULAR; INTRAVENOUS at 18:20

## 2018-01-25 RX ADMIN — METRONIDAZOLE SCH MLS/HR: 500 INJECTION, SOLUTION INTRAVENOUS at 16:25

## 2018-01-25 RX ADMIN — METHYLPREDNISOLONE SODIUM SUCCINATE SCH MG: 40 INJECTION, POWDER, LYOPHILIZED, FOR SOLUTION INTRAMUSCULAR; INTRAVENOUS at 12:23

## 2018-01-25 RX ADMIN — TAZOBACTAM SODIUM AND PIPERACILLIN SODIUM SCH MLS/HR: 375; 3 INJECTION, SOLUTION INTRAVENOUS at 14:05

## 2018-01-25 RX ADMIN — SODIUM CHLORIDE SCH MLS/HR: 9 INJECTION, SOLUTION INTRAVENOUS at 04:06

## 2018-01-25 RX ADMIN — METRONIDAZOLE SCH MLS/HR: 500 INJECTION, SOLUTION INTRAVENOUS at 09:09

## 2018-01-25 RX ADMIN — METHYLPREDNISOLONE SODIUM SUCCINATE SCH MG: 40 INJECTION, POWDER, LYOPHILIZED, FOR SOLUTION INTRAMUSCULAR; INTRAVENOUS at 05:53

## 2018-01-25 RX ADMIN — TAZOBACTAM SODIUM AND PIPERACILLIN SODIUM SCH MLS/HR: 375; 3 INJECTION, SOLUTION INTRAVENOUS at 05:53

## 2018-01-25 RX ADMIN — SODIUM CHLORIDE SCH MLS/HR: 9 INJECTION, SOLUTION INTRAVENOUS at 23:14

## 2018-01-25 RX ADMIN — METHYLPREDNISOLONE SODIUM SUCCINATE SCH MG: 40 INJECTION, POWDER, LYOPHILIZED, FOR SOLUTION INTRAMUSCULAR; INTRAVENOUS at 00:27

## 2018-01-25 RX ADMIN — CIPROFLOXACIN SCH MLS/HR: 2 INJECTION, SOLUTION INTRAVENOUS at 04:06

## 2018-01-26 VITALS — SYSTOLIC BLOOD PRESSURE: 145 MMHG | DIASTOLIC BLOOD PRESSURE: 81 MMHG

## 2018-01-26 VITALS — SYSTOLIC BLOOD PRESSURE: 146 MMHG | DIASTOLIC BLOOD PRESSURE: 84 MMHG

## 2018-01-26 VITALS — DIASTOLIC BLOOD PRESSURE: 71 MMHG | SYSTOLIC BLOOD PRESSURE: 149 MMHG

## 2018-01-26 VITALS — DIASTOLIC BLOOD PRESSURE: 84 MMHG | SYSTOLIC BLOOD PRESSURE: 132 MMHG

## 2018-01-26 VITALS — DIASTOLIC BLOOD PRESSURE: 84 MMHG | SYSTOLIC BLOOD PRESSURE: 146 MMHG

## 2018-01-26 VITALS — SYSTOLIC BLOOD PRESSURE: 142 MMHG | DIASTOLIC BLOOD PRESSURE: 80 MMHG

## 2018-01-26 VITALS — SYSTOLIC BLOOD PRESSURE: 139 MMHG | DIASTOLIC BLOOD PRESSURE: 78 MMHG

## 2018-01-26 RX ADMIN — CIPROFLOXACIN SCH MLS/HR: 2 INJECTION, SOLUTION INTRAVENOUS at 21:10

## 2018-01-26 RX ADMIN — SODIUM CHLORIDE SCH MLS/HR: 9 INJECTION, SOLUTION INTRAVENOUS at 14:05

## 2018-01-26 RX ADMIN — METHYLPREDNISOLONE SODIUM SUCCINATE SCH MG: 40 INJECTION, POWDER, LYOPHILIZED, FOR SOLUTION INTRAMUSCULAR; INTRAVENOUS at 21:10

## 2018-01-26 RX ADMIN — SODIUM CHLORIDE SCH MLS/HR: 9 INJECTION, SOLUTION INTRAVENOUS at 06:05

## 2018-01-26 RX ADMIN — METRONIDAZOLE SCH MLS/HR: 500 INJECTION, SOLUTION INTRAVENOUS at 18:15

## 2018-01-26 RX ADMIN — SODIUM CHLORIDE SCH MLS/HR: 9 INJECTION, SOLUTION INTRAVENOUS at 22:34

## 2018-01-26 RX ADMIN — TAZOBACTAM SODIUM AND PIPERACILLIN SODIUM SCH MLS/HR: 375; 3 INJECTION, SOLUTION INTRAVENOUS at 14:00

## 2018-01-26 RX ADMIN — METRONIDAZOLE SCH MLS/HR: 500 INJECTION, SOLUTION INTRAVENOUS at 00:22

## 2018-01-26 RX ADMIN — CIPROFLOXACIN SCH MLS/HR: 2 INJECTION, SOLUTION INTRAVENOUS at 04:43

## 2018-01-26 RX ADMIN — TAZOBACTAM SODIUM AND PIPERACILLIN SODIUM SCH MLS/HR: 375; 3 INJECTION, SOLUTION INTRAVENOUS at 06:21

## 2018-01-26 RX ADMIN — TAZOBACTAM SODIUM AND PIPERACILLIN SODIUM SCH MLS/HR: 375; 3 INJECTION, SOLUTION INTRAVENOUS at 22:34

## 2018-01-26 RX ADMIN — METRONIDAZOLE SCH MLS/HR: 500 INJECTION, SOLUTION INTRAVENOUS at 08:51

## 2018-01-26 RX ADMIN — METHYLPREDNISOLONE SODIUM SUCCINATE SCH MG: 40 INJECTION, POWDER, LYOPHILIZED, FOR SOLUTION INTRAMUSCULAR; INTRAVENOUS at 00:21

## 2018-01-26 RX ADMIN — METHYLPREDNISOLONE SODIUM SUCCINATE SCH MG: 40 INJECTION, POWDER, LYOPHILIZED, FOR SOLUTION INTRAMUSCULAR; INTRAVENOUS at 06:21

## 2018-01-27 VITALS — SYSTOLIC BLOOD PRESSURE: 136 MMHG | DIASTOLIC BLOOD PRESSURE: 82 MMHG

## 2018-01-27 VITALS — SYSTOLIC BLOOD PRESSURE: 146 MMHG | DIASTOLIC BLOOD PRESSURE: 90 MMHG

## 2018-01-27 VITALS — DIASTOLIC BLOOD PRESSURE: 74 MMHG | SYSTOLIC BLOOD PRESSURE: 130 MMHG

## 2018-01-27 VITALS — SYSTOLIC BLOOD PRESSURE: 130 MMHG | DIASTOLIC BLOOD PRESSURE: 74 MMHG

## 2018-01-27 VITALS — SYSTOLIC BLOOD PRESSURE: 131 MMHG | DIASTOLIC BLOOD PRESSURE: 74 MMHG

## 2018-01-27 VITALS — SYSTOLIC BLOOD PRESSURE: 120 MMHG | DIASTOLIC BLOOD PRESSURE: 87 MMHG

## 2018-01-27 VITALS — SYSTOLIC BLOOD PRESSURE: 138 MMHG | DIASTOLIC BLOOD PRESSURE: 84 MMHG

## 2018-01-27 LAB
ANISOCYTOSIS BLD QL SMEAR: (no result)
BASOPHILS # BLD AUTO: 0 10*3/UL (ref 0–0.1)
BASOPHILS NFR BLD AUTO: 0.2 % (ref 0–1)
DEPRECATED NEUTROPHILS # BLD AUTO: 4.4 10*3/UL (ref 2.1–6.9)
EOSINOPHIL # BLD AUTO: 0 10*3/UL (ref 0–0.4)
EOSINOPHIL NFR BLD AUTO: 0.5 % (ref 0–6)
ERYTHROCYTE [DISTWIDTH] IN CORD BLOOD: 28.6 % (ref 11.7–14.4)
HCT VFR BLD AUTO: 32 % (ref 38.2–49.6)
HGB BLD-MCNC: 8.3 G/DL (ref 14–18)
HYPOCHROMIA BLD QL SMEAR: (no result)
LYMPHOCYTES # BLD: 0.7 10*3/UL (ref 1–3.2)
LYMPHOCYTES NFR BLD AUTO: 11.6 % (ref 18–39.1)
MCH RBC QN AUTO: 18.4 PG (ref 28–32)
MCHC RBC AUTO-ENTMCNC: 25.9 G/DL (ref 31–35)
MCV RBC AUTO: 71 FL (ref 81–99)
MICROCYTES BLD QL SMEAR: (no result)
MONOCYTES # BLD AUTO: 0.9 10*3/UL (ref 0.2–0.8)
MONOCYTES NFR BLD AUTO: 14.2 % (ref 4.4–11.3)
NEUTS SEG NFR BLD AUTO: 73 % (ref 38.7–80)
PLAT MORPH BLD: NORMAL
PLATELET # BLD AUTO: 356 X10E3/UL (ref 140–360)
PLATELET # BLD EST: ADEQUATE 10*3/UL
POIKILOCYTOSIS BLD QL SMEAR: SLIGHT
POLYCHROMASIA BLD QL SMEAR: (no result)
RBC # BLD AUTO: 4.51 X10E6/UL (ref 4.3–5.7)
RBC MORPH BLD: (no result)
SCHISTOCYTES BLD QL SMEAR: (no result)
TARGETS BLD QL SMEAR: (no result)

## 2018-01-27 RX ADMIN — SODIUM CHLORIDE SCH MLS/HR: 9 INJECTION, SOLUTION INTRAVENOUS at 15:03

## 2018-01-27 RX ADMIN — SODIUM CHLORIDE SCH MLS/HR: 9 INJECTION, SOLUTION INTRAVENOUS at 06:05

## 2018-01-27 RX ADMIN — TAZOBACTAM SODIUM AND PIPERACILLIN SODIUM SCH MLS/HR: 375; 3 INJECTION, SOLUTION INTRAVENOUS at 14:54

## 2018-01-27 RX ADMIN — METRONIDAZOLE SCH MLS/HR: 500 INJECTION, SOLUTION INTRAVENOUS at 01:31

## 2018-01-27 RX ADMIN — METHYLPREDNISOLONE SODIUM SUCCINATE SCH MG: 40 INJECTION, POWDER, LYOPHILIZED, FOR SOLUTION INTRAMUSCULAR; INTRAVENOUS at 08:58

## 2018-01-27 RX ADMIN — METHYLPREDNISOLONE SODIUM SUCCINATE SCH MG: 40 INJECTION, POWDER, LYOPHILIZED, FOR SOLUTION INTRAMUSCULAR; INTRAVENOUS at 21:23

## 2018-01-27 RX ADMIN — TAZOBACTAM SODIUM AND PIPERACILLIN SODIUM SCH MLS/HR: 375; 3 INJECTION, SOLUTION INTRAVENOUS at 21:23

## 2018-01-27 RX ADMIN — CIPROFLOXACIN SCH MLS/HR: 2 INJECTION, SOLUTION INTRAVENOUS at 17:56

## 2018-01-27 RX ADMIN — METRONIDAZOLE SCH MLS/HR: 500 INJECTION, SOLUTION INTRAVENOUS at 08:58

## 2018-01-27 RX ADMIN — METRONIDAZOLE SCH MLS/HR: 500 INJECTION, SOLUTION INTRAVENOUS at 16:48

## 2018-01-27 RX ADMIN — TAZOBACTAM SODIUM AND PIPERACILLIN SODIUM SCH MLS/HR: 375; 3 INJECTION, SOLUTION INTRAVENOUS at 06:46

## 2018-01-27 RX ADMIN — CIPROFLOXACIN SCH MLS/HR: 2 INJECTION, SOLUTION INTRAVENOUS at 05:30

## 2018-01-28 VITALS — DIASTOLIC BLOOD PRESSURE: 76 MMHG | SYSTOLIC BLOOD PRESSURE: 132 MMHG

## 2018-01-28 VITALS — DIASTOLIC BLOOD PRESSURE: 74 MMHG | SYSTOLIC BLOOD PRESSURE: 128 MMHG

## 2018-01-28 VITALS — SYSTOLIC BLOOD PRESSURE: 128 MMHG | DIASTOLIC BLOOD PRESSURE: 77 MMHG

## 2018-01-28 VITALS — SYSTOLIC BLOOD PRESSURE: 135 MMHG | DIASTOLIC BLOOD PRESSURE: 81 MMHG

## 2018-01-28 VITALS — DIASTOLIC BLOOD PRESSURE: 83 MMHG | SYSTOLIC BLOOD PRESSURE: 131 MMHG

## 2018-01-28 VITALS — DIASTOLIC BLOOD PRESSURE: 74 MMHG | SYSTOLIC BLOOD PRESSURE: 131 MMHG

## 2018-01-28 VITALS — DIASTOLIC BLOOD PRESSURE: 77 MMHG | SYSTOLIC BLOOD PRESSURE: 128 MMHG

## 2018-01-28 RX ADMIN — CIPROFLOXACIN SCH MLS/HR: 2 INJECTION, SOLUTION INTRAVENOUS at 05:50

## 2018-01-28 RX ADMIN — SODIUM CHLORIDE SCH MLS/HR: 9 INJECTION, SOLUTION INTRAVENOUS at 06:05

## 2018-01-28 RX ADMIN — METHYLPREDNISOLONE SODIUM SUCCINATE SCH MG: 40 INJECTION, POWDER, LYOPHILIZED, FOR SOLUTION INTRAMUSCULAR; INTRAVENOUS at 09:05

## 2018-01-28 RX ADMIN — METRONIDAZOLE SCH MLS/HR: 500 INJECTION, SOLUTION INTRAVENOUS at 09:05

## 2018-01-28 RX ADMIN — TAZOBACTAM SODIUM AND PIPERACILLIN SODIUM SCH MLS/HR: 375; 3 INJECTION, SOLUTION INTRAVENOUS at 14:14

## 2018-01-28 RX ADMIN — CIPROFLOXACIN SCH MLS/HR: 2 INJECTION, SOLUTION INTRAVENOUS at 17:59

## 2018-01-28 RX ADMIN — METHYLPREDNISOLONE SODIUM SUCCINATE SCH MG: 40 INJECTION, POWDER, LYOPHILIZED, FOR SOLUTION INTRAMUSCULAR; INTRAVENOUS at 21:35

## 2018-01-28 RX ADMIN — SODIUM CHLORIDE SCH MLS/HR: 9 INJECTION, SOLUTION INTRAVENOUS at 06:47

## 2018-01-28 RX ADMIN — TAZOBACTAM SODIUM AND PIPERACILLIN SODIUM SCH MLS/HR: 375; 3 INJECTION, SOLUTION INTRAVENOUS at 06:47

## 2018-01-28 RX ADMIN — METRONIDAZOLE SCH MLS/HR: 500 INJECTION, SOLUTION INTRAVENOUS at 01:00

## 2018-01-28 RX ADMIN — METRONIDAZOLE SCH MLS/HR: 500 INJECTION, SOLUTION INTRAVENOUS at 16:54

## 2018-01-28 RX ADMIN — SODIUM CHLORIDE SCH MLS/HR: 9 INJECTION, SOLUTION INTRAVENOUS at 14:05

## 2018-01-28 RX ADMIN — TAZOBACTAM SODIUM AND PIPERACILLIN SODIUM SCH MLS/HR: 375; 3 INJECTION, SOLUTION INTRAVENOUS at 21:35

## 2018-01-29 VITALS — DIASTOLIC BLOOD PRESSURE: 73 MMHG | SYSTOLIC BLOOD PRESSURE: 125 MMHG

## 2018-01-29 VITALS — DIASTOLIC BLOOD PRESSURE: 77 MMHG | SYSTOLIC BLOOD PRESSURE: 132 MMHG

## 2018-01-29 VITALS — SYSTOLIC BLOOD PRESSURE: 118 MMHG | DIASTOLIC BLOOD PRESSURE: 69 MMHG

## 2018-01-29 VITALS — SYSTOLIC BLOOD PRESSURE: 134 MMHG | DIASTOLIC BLOOD PRESSURE: 78 MMHG

## 2018-01-29 VITALS — SYSTOLIC BLOOD PRESSURE: 135 MMHG | DIASTOLIC BLOOD PRESSURE: 83 MMHG

## 2018-01-29 VITALS — SYSTOLIC BLOOD PRESSURE: 140 MMHG

## 2018-01-29 RX ADMIN — TAZOBACTAM SODIUM AND PIPERACILLIN SODIUM SCH MLS/HR: 375; 3 INJECTION, SOLUTION INTRAVENOUS at 22:36

## 2018-01-29 RX ADMIN — SODIUM CHLORIDE SCH MLS/HR: 9 INJECTION, SOLUTION INTRAVENOUS at 16:12

## 2018-01-29 RX ADMIN — METRONIDAZOLE SCH MLS/HR: 500 INJECTION, SOLUTION INTRAVENOUS at 00:19

## 2018-01-29 RX ADMIN — SODIUM CHLORIDE SCH MLS/HR: 9 INJECTION, SOLUTION INTRAVENOUS at 03:23

## 2018-01-29 RX ADMIN — SODIUM CHLORIDE SCH MLS/HR: 9 INJECTION, SOLUTION INTRAVENOUS at 22:36

## 2018-01-29 RX ADMIN — CIPROFLOXACIN SCH MLS/HR: 2 INJECTION, SOLUTION INTRAVENOUS at 16:12

## 2018-01-29 RX ADMIN — SODIUM CHLORIDE SCH MLS/HR: 9 INJECTION, SOLUTION INTRAVENOUS at 06:05

## 2018-01-29 RX ADMIN — METRONIDAZOLE SCH MLS/HR: 500 INJECTION, SOLUTION INTRAVENOUS at 16:09

## 2018-01-29 RX ADMIN — METRONIDAZOLE SCH MLS/HR: 500 INJECTION, SOLUTION INTRAVENOUS at 08:32

## 2018-01-29 RX ADMIN — CIPROFLOXACIN SCH MLS/HR: 2 INJECTION, SOLUTION INTRAVENOUS at 04:49

## 2018-01-29 RX ADMIN — METHYLPREDNISOLONE SODIUM SUCCINATE SCH MG: 40 INJECTION, POWDER, LYOPHILIZED, FOR SOLUTION INTRAMUSCULAR; INTRAVENOUS at 09:00

## 2018-01-29 RX ADMIN — TAZOBACTAM SODIUM AND PIPERACILLIN SODIUM SCH MLS/HR: 375; 3 INJECTION, SOLUTION INTRAVENOUS at 14:00

## 2018-01-29 RX ADMIN — TAZOBACTAM SODIUM AND PIPERACILLIN SODIUM SCH MLS/HR: 375; 3 INJECTION, SOLUTION INTRAVENOUS at 05:56

## 2018-01-29 RX ADMIN — METHYLPREDNISOLONE SODIUM SUCCINATE SCH MG: 40 INJECTION, POWDER, LYOPHILIZED, FOR SOLUTION INTRAMUSCULAR; INTRAVENOUS at 08:32

## 2018-01-30 VITALS — DIASTOLIC BLOOD PRESSURE: 79 MMHG | SYSTOLIC BLOOD PRESSURE: 125 MMHG

## 2018-01-30 VITALS — DIASTOLIC BLOOD PRESSURE: 79 MMHG | SYSTOLIC BLOOD PRESSURE: 131 MMHG

## 2018-01-30 VITALS — SYSTOLIC BLOOD PRESSURE: 134 MMHG | DIASTOLIC BLOOD PRESSURE: 81 MMHG

## 2018-01-30 VITALS — DIASTOLIC BLOOD PRESSURE: 80 MMHG | SYSTOLIC BLOOD PRESSURE: 130 MMHG

## 2018-01-30 VITALS — DIASTOLIC BLOOD PRESSURE: 86 MMHG | SYSTOLIC BLOOD PRESSURE: 135 MMHG

## 2018-01-30 VITALS — DIASTOLIC BLOOD PRESSURE: 81 MMHG | SYSTOLIC BLOOD PRESSURE: 134 MMHG

## 2018-01-30 RX ADMIN — TAZOBACTAM SODIUM AND PIPERACILLIN SODIUM SCH MLS/HR: 375; 3 INJECTION, SOLUTION INTRAVENOUS at 06:08

## 2018-01-30 RX ADMIN — CIPROFLOXACIN SCH MLS/HR: 2 INJECTION, SOLUTION INTRAVENOUS at 04:58

## 2018-01-30 RX ADMIN — METRONIDAZOLE SCH MLS/HR: 500 INJECTION, SOLUTION INTRAVENOUS at 16:50

## 2018-01-30 RX ADMIN — SODIUM CHLORIDE SCH MLS/HR: 9 INJECTION, SOLUTION INTRAVENOUS at 13:00

## 2018-01-30 RX ADMIN — METRONIDAZOLE SCH MLS/HR: 500 INJECTION, SOLUTION INTRAVENOUS at 01:38

## 2018-01-30 RX ADMIN — CIPROFLOXACIN SCH MLS/HR: 2 INJECTION, SOLUTION INTRAVENOUS at 17:45

## 2018-01-30 RX ADMIN — SODIUM CHLORIDE SCH MLS/HR: 9 INJECTION, SOLUTION INTRAVENOUS at 22:05

## 2018-01-30 RX ADMIN — SODIUM CHLORIDE SCH MLS/HR: 9 INJECTION, SOLUTION INTRAVENOUS at 06:05

## 2018-01-30 RX ADMIN — TAZOBACTAM SODIUM AND PIPERACILLIN SODIUM SCH MLS/HR: 375; 3 INJECTION, SOLUTION INTRAVENOUS at 12:37

## 2018-01-30 RX ADMIN — METHYLPREDNISOLONE SODIUM SUCCINATE SCH MG: 40 INJECTION, POWDER, LYOPHILIZED, FOR SOLUTION INTRAMUSCULAR; INTRAVENOUS at 08:59

## 2018-01-30 RX ADMIN — METRONIDAZOLE SCH MLS/HR: 500 INJECTION, SOLUTION INTRAVENOUS at 08:59

## 2018-01-30 RX ADMIN — TAZOBACTAM SODIUM AND PIPERACILLIN SODIUM SCH MLS/HR: 375; 3 INJECTION, SOLUTION INTRAVENOUS at 22:55

## 2018-01-31 VITALS — SYSTOLIC BLOOD PRESSURE: 111 MMHG | DIASTOLIC BLOOD PRESSURE: 85 MMHG

## 2018-01-31 VITALS — DIASTOLIC BLOOD PRESSURE: 65 MMHG | SYSTOLIC BLOOD PRESSURE: 97 MMHG

## 2018-01-31 VITALS — SYSTOLIC BLOOD PRESSURE: 92 MMHG | DIASTOLIC BLOOD PRESSURE: 76 MMHG

## 2018-01-31 VITALS — SYSTOLIC BLOOD PRESSURE: 94 MMHG | DIASTOLIC BLOOD PRESSURE: 69 MMHG

## 2018-01-31 VITALS — SYSTOLIC BLOOD PRESSURE: 104 MMHG | DIASTOLIC BLOOD PRESSURE: 67 MMHG

## 2018-01-31 VITALS — DIASTOLIC BLOOD PRESSURE: 74 MMHG | SYSTOLIC BLOOD PRESSURE: 119 MMHG

## 2018-01-31 VITALS — SYSTOLIC BLOOD PRESSURE: 118 MMHG | DIASTOLIC BLOOD PRESSURE: 72 MMHG

## 2018-01-31 VITALS — DIASTOLIC BLOOD PRESSURE: 66 MMHG | SYSTOLIC BLOOD PRESSURE: 96 MMHG

## 2018-01-31 VITALS — SYSTOLIC BLOOD PRESSURE: 102 MMHG | DIASTOLIC BLOOD PRESSURE: 67 MMHG

## 2018-01-31 VITALS — SYSTOLIC BLOOD PRESSURE: 98 MMHG | DIASTOLIC BLOOD PRESSURE: 69 MMHG

## 2018-01-31 VITALS — DIASTOLIC BLOOD PRESSURE: 68 MMHG | SYSTOLIC BLOOD PRESSURE: 119 MMHG

## 2018-01-31 VITALS — SYSTOLIC BLOOD PRESSURE: 117 MMHG | DIASTOLIC BLOOD PRESSURE: 67 MMHG

## 2018-01-31 VITALS — DIASTOLIC BLOOD PRESSURE: 58 MMHG | SYSTOLIC BLOOD PRESSURE: 90 MMHG

## 2018-01-31 VITALS — SYSTOLIC BLOOD PRESSURE: 85 MMHG | DIASTOLIC BLOOD PRESSURE: 64 MMHG

## 2018-01-31 VITALS — SYSTOLIC BLOOD PRESSURE: 114 MMHG | DIASTOLIC BLOOD PRESSURE: 68 MMHG

## 2018-01-31 VITALS — DIASTOLIC BLOOD PRESSURE: 71 MMHG | SYSTOLIC BLOOD PRESSURE: 108 MMHG

## 2018-01-31 VITALS — DIASTOLIC BLOOD PRESSURE: 80 MMHG | SYSTOLIC BLOOD PRESSURE: 107 MMHG

## 2018-01-31 VITALS — SYSTOLIC BLOOD PRESSURE: 110 MMHG | DIASTOLIC BLOOD PRESSURE: 65 MMHG

## 2018-01-31 VITALS — DIASTOLIC BLOOD PRESSURE: 66 MMHG | SYSTOLIC BLOOD PRESSURE: 103 MMHG

## 2018-01-31 VITALS — SYSTOLIC BLOOD PRESSURE: 132 MMHG | DIASTOLIC BLOOD PRESSURE: 74 MMHG

## 2018-01-31 VITALS — DIASTOLIC BLOOD PRESSURE: 71 MMHG | SYSTOLIC BLOOD PRESSURE: 96 MMHG

## 2018-01-31 VITALS — SYSTOLIC BLOOD PRESSURE: 102 MMHG | DIASTOLIC BLOOD PRESSURE: 73 MMHG

## 2018-01-31 VITALS — SYSTOLIC BLOOD PRESSURE: 97 MMHG | DIASTOLIC BLOOD PRESSURE: 72 MMHG

## 2018-01-31 VITALS — SYSTOLIC BLOOD PRESSURE: 100 MMHG | DIASTOLIC BLOOD PRESSURE: 74 MMHG

## 2018-01-31 VITALS — DIASTOLIC BLOOD PRESSURE: 76 MMHG | SYSTOLIC BLOOD PRESSURE: 123 MMHG

## 2018-01-31 VITALS — DIASTOLIC BLOOD PRESSURE: 74 MMHG | SYSTOLIC BLOOD PRESSURE: 93 MMHG

## 2018-01-31 VITALS — SYSTOLIC BLOOD PRESSURE: 91 MMHG | DIASTOLIC BLOOD PRESSURE: 65 MMHG

## 2018-01-31 VITALS — SYSTOLIC BLOOD PRESSURE: 100 MMHG | DIASTOLIC BLOOD PRESSURE: 70 MMHG

## 2018-01-31 VITALS — SYSTOLIC BLOOD PRESSURE: 99 MMHG | DIASTOLIC BLOOD PRESSURE: 81 MMHG

## 2018-01-31 VITALS — DIASTOLIC BLOOD PRESSURE: 65 MMHG | SYSTOLIC BLOOD PRESSURE: 96 MMHG

## 2018-01-31 VITALS — DIASTOLIC BLOOD PRESSURE: 72 MMHG | SYSTOLIC BLOOD PRESSURE: 119 MMHG

## 2018-01-31 VITALS — SYSTOLIC BLOOD PRESSURE: 99 MMHG | DIASTOLIC BLOOD PRESSURE: 65 MMHG

## 2018-01-31 VITALS — SYSTOLIC BLOOD PRESSURE: 94 MMHG | DIASTOLIC BLOOD PRESSURE: 66 MMHG

## 2018-01-31 LAB
ALBUMIN SERPL-MCNC: 2 G/DL (ref 3.5–5)
ALBUMIN/GLOB SERPL: 0.6 {RATIO} (ref 0.8–2)
ALP SERPL-CCNC: 39 IU/L (ref 40–150)
ALT SERPL-CCNC: 9 IU/L (ref 0–55)
ANION GAP SERPL CALC-SCNC: 9 MMOL/L (ref 8–16)
BASOPHILS # BLD AUTO: 0 10*3/UL (ref 0–0.1)
BASOPHILS NFR BLD AUTO: 0.2 % (ref 0–1)
BUN SERPL-MCNC: 9 MG/DL (ref 7–26)
BUN/CREAT SERPL: 11 (ref 6–25)
CALCIUM SERPL-MCNC: 7.7 MG/DL (ref 8.4–10.2)
CHLORIDE SERPL-SCNC: 107 MMOL/L (ref 98–107)
CO2 SERPL-SCNC: 24 MMOL/L (ref 22–29)
DEPRECATED INR PLAS: 1.19
DEPRECATED NEUTROPHILS # BLD AUTO: 3.8 10*3/UL (ref 2.1–6.9)
EGFRCR SERPLBLD CKD-EPI 2021: > 60 ML/MIN (ref 60–?)
EOSINOPHIL # BLD AUTO: 0.1 10*3/UL (ref 0–0.4)
EOSINOPHIL NFR BLD AUTO: 1.1 % (ref 0–6)
ERYTHROCYTE [DISTWIDTH] IN CORD BLOOD: 32.3 % (ref 11.7–14.4)
GLOBULIN PLAS-MCNC: 3.2 G/DL (ref 2.3–3.5)
GLUCOSE SERPLBLD-MCNC: 91 MG/DL (ref 74–118)
HCT VFR BLD AUTO: 33.6 % (ref 38.2–49.6)
HGB BLD-MCNC: 9 G/DL (ref 14–18)
LYMPHOCYTES # BLD: 0.6 10*3/UL (ref 1–3.2)
LYMPHOCYTES NFR BLD AUTO: 10.8 % (ref 18–39.1)
MCH RBC QN AUTO: 20 PG (ref 28–32)
MCHC RBC AUTO-ENTMCNC: 26.8 G/DL (ref 31–35)
MCV RBC AUTO: 74.7 FL (ref 81–99)
MONOCYTES # BLD AUTO: 1.1 10*3/UL (ref 0.2–0.8)
MONOCYTES NFR BLD AUTO: 20.1 % (ref 4.4–11.3)
NEUTS SEG NFR BLD AUTO: 67.4 % (ref 38.7–80)
PLATELET # BLD AUTO: 330 X10E3/UL (ref 140–360)
POTASSIUM SERPL-SCNC: 4 MMOL/L (ref 3.5–5.1)
PROTHROMBIN TIME: 15.7 SECONDS (ref 11.9–14.5)
RBC # BLD AUTO: 4.5 X10E6/UL (ref 4.3–5.7)
SODIUM SERPL-SCNC: 136 MMOL/L (ref 136–145)

## 2018-01-31 PROCEDURE — 0DTF4ZZ RESECTION OF RIGHT LARGE INTESTINE, PERCUTANEOUS ENDOSCOPIC APPROACH: ICD-10-PCS | Performed by: SURGERY

## 2018-01-31 PROCEDURE — 0D1K4Z4 BYPASS ASCENDING COLON TO CUTANEOUS, PERCUTANEOUS ENDOSCOPIC APPROACH: ICD-10-PCS | Performed by: SURGERY

## 2018-01-31 RX ADMIN — TAZOBACTAM SODIUM AND PIPERACILLIN SODIUM SCH MLS/HR: 375; 3 INJECTION, SOLUTION INTRAVENOUS at 14:00

## 2018-01-31 RX ADMIN — SODIUM CHLORIDE, SODIUM LACTATE, POTASSIUM CHLORIDE, CALCIUM CHLORIDE AND DEXTROSE MONOHYDRATE SCH MLS/HR: 5; 600; 310; 30; 20 INJECTION, SOLUTION INTRAVENOUS at 20:49

## 2018-01-31 RX ADMIN — CIPROFLOXACIN SCH MLS/HR: 2 INJECTION, SOLUTION INTRAVENOUS at 04:41

## 2018-01-31 RX ADMIN — TAZOBACTAM SODIUM AND PIPERACILLIN SODIUM SCH MLS/HR: 375; 3 INJECTION, SOLUTION INTRAVENOUS at 06:18

## 2018-01-31 RX ADMIN — SODIUM CHLORIDE SCH ML: 900 IRRIGANT IRRIGATION at 16:30

## 2018-01-31 RX ADMIN — SODIUM CHLORIDE SCH MLS/HR: 9 INJECTION, SOLUTION INTRAVENOUS at 06:05

## 2018-01-31 RX ADMIN — TAZOBACTAM SODIUM AND PIPERACILLIN SODIUM SCH MLS/HR: 375; 3 INJECTION, SOLUTION INTRAVENOUS at 22:45

## 2018-01-31 RX ADMIN — SODIUM CHLORIDE SCH MG: 900 INJECTION INTRAVENOUS at 18:56

## 2018-01-31 RX ADMIN — METRONIDAZOLE SCH MLS/HR: 500 INJECTION, SOLUTION INTRAVENOUS at 16:30

## 2018-01-31 RX ADMIN — METHYLPREDNISOLONE SODIUM SUCCINATE SCH MG: 40 INJECTION, POWDER, LYOPHILIZED, FOR SOLUTION INTRAMUSCULAR; INTRAVENOUS at 09:21

## 2018-01-31 RX ADMIN — SODIUM CHLORIDE SCH MLS/HR: 9 INJECTION, SOLUTION INTRAVENOUS at 14:05

## 2018-01-31 RX ADMIN — SODIUM CHLORIDE SCH ML: 900 IRRIGANT IRRIGATION at 20:10

## 2018-01-31 RX ADMIN — METRONIDAZOLE SCH MLS/HR: 500 INJECTION, SOLUTION INTRAVENOUS at 02:34

## 2018-01-31 RX ADMIN — METRONIDAZOLE SCH MLS/HR: 500 INJECTION, SOLUTION INTRAVENOUS at 08:47

## 2018-01-31 RX ADMIN — ACETAMINOPHEN PRN MG: 10 INJECTION, SOLUTION INTRAVENOUS at 17:26

## 2018-02-01 VITALS — DIASTOLIC BLOOD PRESSURE: 67 MMHG | SYSTOLIC BLOOD PRESSURE: 108 MMHG

## 2018-02-01 VITALS — SYSTOLIC BLOOD PRESSURE: 106 MMHG | DIASTOLIC BLOOD PRESSURE: 71 MMHG

## 2018-02-01 VITALS — DIASTOLIC BLOOD PRESSURE: 68 MMHG | SYSTOLIC BLOOD PRESSURE: 98 MMHG

## 2018-02-01 VITALS — SYSTOLIC BLOOD PRESSURE: 104 MMHG | DIASTOLIC BLOOD PRESSURE: 74 MMHG

## 2018-02-01 VITALS — SYSTOLIC BLOOD PRESSURE: 108 MMHG | DIASTOLIC BLOOD PRESSURE: 75 MMHG

## 2018-02-01 VITALS — DIASTOLIC BLOOD PRESSURE: 68 MMHG | SYSTOLIC BLOOD PRESSURE: 107 MMHG

## 2018-02-01 VITALS — DIASTOLIC BLOOD PRESSURE: 72 MMHG | SYSTOLIC BLOOD PRESSURE: 103 MMHG

## 2018-02-01 VITALS — SYSTOLIC BLOOD PRESSURE: 106 MMHG | DIASTOLIC BLOOD PRESSURE: 70 MMHG

## 2018-02-01 VITALS — SYSTOLIC BLOOD PRESSURE: 106 MMHG | DIASTOLIC BLOOD PRESSURE: 66 MMHG

## 2018-02-01 VITALS — SYSTOLIC BLOOD PRESSURE: 122 MMHG | DIASTOLIC BLOOD PRESSURE: 67 MMHG

## 2018-02-01 VITALS — DIASTOLIC BLOOD PRESSURE: 68 MMHG | SYSTOLIC BLOOD PRESSURE: 100 MMHG

## 2018-02-01 VITALS — SYSTOLIC BLOOD PRESSURE: 105 MMHG | DIASTOLIC BLOOD PRESSURE: 68 MMHG

## 2018-02-01 VITALS — SYSTOLIC BLOOD PRESSURE: 118 MMHG | DIASTOLIC BLOOD PRESSURE: 78 MMHG

## 2018-02-01 VITALS — SYSTOLIC BLOOD PRESSURE: 113 MMHG | DIASTOLIC BLOOD PRESSURE: 67 MMHG

## 2018-02-01 VITALS — SYSTOLIC BLOOD PRESSURE: 101 MMHG | DIASTOLIC BLOOD PRESSURE: 75 MMHG

## 2018-02-01 VITALS — SYSTOLIC BLOOD PRESSURE: 95 MMHG | DIASTOLIC BLOOD PRESSURE: 67 MMHG

## 2018-02-01 VITALS — DIASTOLIC BLOOD PRESSURE: 68 MMHG | SYSTOLIC BLOOD PRESSURE: 101 MMHG

## 2018-02-01 VITALS — SYSTOLIC BLOOD PRESSURE: 95 MMHG | DIASTOLIC BLOOD PRESSURE: 73 MMHG

## 2018-02-01 VITALS — DIASTOLIC BLOOD PRESSURE: 73 MMHG | SYSTOLIC BLOOD PRESSURE: 103 MMHG

## 2018-02-01 VITALS — DIASTOLIC BLOOD PRESSURE: 69 MMHG | SYSTOLIC BLOOD PRESSURE: 118 MMHG

## 2018-02-01 VITALS — DIASTOLIC BLOOD PRESSURE: 73 MMHG | SYSTOLIC BLOOD PRESSURE: 111 MMHG

## 2018-02-01 VITALS — DIASTOLIC BLOOD PRESSURE: 77 MMHG | SYSTOLIC BLOOD PRESSURE: 118 MMHG

## 2018-02-01 VITALS — SYSTOLIC BLOOD PRESSURE: 102 MMHG | DIASTOLIC BLOOD PRESSURE: 65 MMHG

## 2018-02-01 VITALS — SYSTOLIC BLOOD PRESSURE: 105 MMHG | DIASTOLIC BLOOD PRESSURE: 69 MMHG

## 2018-02-01 VITALS — SYSTOLIC BLOOD PRESSURE: 122 MMHG | DIASTOLIC BLOOD PRESSURE: 75 MMHG

## 2018-02-01 VITALS — SYSTOLIC BLOOD PRESSURE: 106 MMHG | DIASTOLIC BLOOD PRESSURE: 72 MMHG

## 2018-02-01 VITALS — DIASTOLIC BLOOD PRESSURE: 77 MMHG | SYSTOLIC BLOOD PRESSURE: 86 MMHG

## 2018-02-01 VITALS — DIASTOLIC BLOOD PRESSURE: 79 MMHG | SYSTOLIC BLOOD PRESSURE: 108 MMHG

## 2018-02-01 VITALS — DIASTOLIC BLOOD PRESSURE: 80 MMHG | SYSTOLIC BLOOD PRESSURE: 121 MMHG

## 2018-02-01 VITALS — DIASTOLIC BLOOD PRESSURE: 68 MMHG | SYSTOLIC BLOOD PRESSURE: 116 MMHG

## 2018-02-01 LAB
ALBUMIN SERPL-MCNC: 1.4 G/DL (ref 3.5–5)
ALBUMIN SERPL-MCNC: 1.4 G/DL (ref 3.5–5)
ALBUMIN/GLOB SERPL: 0.5 {RATIO} (ref 0.8–2)
ALBUMIN/GLOB SERPL: 0.6 {RATIO} (ref 0.8–2)
ALP SERPL-CCNC: 32 IU/L (ref 40–150)
ALP SERPL-CCNC: 36 IU/L (ref 40–150)
ALT SERPL-CCNC: 8 IU/L (ref 0–55)
ALT SERPL-CCNC: 8 IU/L (ref 0–55)
ANION GAP SERPL CALC-SCNC: 10.2 MMOL/L (ref 8–16)
ANION GAP SERPL CALC-SCNC: 12.8 MMOL/L (ref 8–16)
ANISOCYTOSIS BLD QL SMEAR: (no result)
BASOPHILS # BLD AUTO: 0 10*3/UL (ref 0–0.1)
BASOPHILS # BLD AUTO: 0.1 10*3/UL (ref 0–0.1)
BASOPHILS NFR BLD AUTO: 0.2 % (ref 0–1)
BASOPHILS NFR BLD AUTO: 0.3 % (ref 0–1)
BUN SERPL-MCNC: 14 MG/DL (ref 7–26)
BUN SERPL-MCNC: 14 MG/DL (ref 7–26)
BUN/CREAT SERPL: 10 (ref 6–25)
BUN/CREAT SERPL: 12 (ref 6–25)
CALCIUM SERPL-MCNC: 7 MG/DL (ref 8.4–10.2)
CALCIUM SERPL-MCNC: 7.1 MG/DL (ref 8.4–10.2)
CHLORIDE SERPL-SCNC: 110 MMOL/L (ref 98–107)
CHLORIDE SERPL-SCNC: 110 MMOL/L (ref 98–107)
CO2 SERPL-SCNC: 19 MMOL/L (ref 22–29)
CO2 SERPL-SCNC: 20 MMOL/L (ref 22–29)
DEPRECATED NEUTROPHILS # BLD AUTO: 17.5 10*3/UL (ref 2.1–6.9)
DEPRECATED NEUTROPHILS # BLD AUTO: 24.1 10*3/UL (ref 2.1–6.9)
EGFRCR SERPLBLD CKD-EPI 2021: 52 ML/MIN (ref 60–?)
EGFRCR SERPLBLD CKD-EPI 2021: > 60 ML/MIN (ref 60–?)
EOSINOPHIL # BLD AUTO: 0 10*3/UL (ref 0–0.4)
EOSINOPHIL # BLD AUTO: 0 10*3/UL (ref 0–0.4)
EOSINOPHIL NFR BLD AUTO: 0 % (ref 0–6)
EOSINOPHIL NFR BLD AUTO: 0.1 % (ref 0–6)
ERYTHROCYTE [DISTWIDTH] IN CORD BLOOD: 30 % (ref 11.7–14.4)
ERYTHROCYTE [DISTWIDTH] IN CORD BLOOD: 30.1 % (ref 11.7–14.4)
GLOBULIN PLAS-MCNC: 2.4 G/DL (ref 2.3–3.5)
GLOBULIN PLAS-MCNC: 2.6 G/DL (ref 2.3–3.5)
GLUCOSE SERPLBLD-MCNC: 142 MG/DL (ref 74–118)
GLUCOSE SERPLBLD-MCNC: 253 MG/DL (ref 74–118)
HCT VFR BLD AUTO: 38.2 % (ref 38.2–49.6)
HCT VFR BLD AUTO: 38.3 % (ref 38.2–49.6)
HGB BLD-MCNC: 11.1 G/DL (ref 14–18)
HGB BLD-MCNC: 11.1 G/DL (ref 14–18)
HYPOCHROMIA BLD QL SMEAR: (no result)
LYMPHOCYTES # BLD: 0.8 10*3/UL (ref 1–3.2)
LYMPHOCYTES # BLD: 1.3 10*3/UL (ref 1–3.2)
LYMPHOCYTES NFR BLD AUTO: 3 % (ref 18–39.1)
LYMPHOCYTES NFR BLD AUTO: 6.3 % (ref 18–39.1)
LYMPHOCYTES NFR BLD MANUAL: 7 % (ref 19–48)
MCH RBC QN AUTO: 23 PG (ref 28–32)
MCH RBC QN AUTO: 23.1 PG (ref 28–32)
MCHC RBC AUTO-ENTMCNC: 29 G/DL (ref 31–35)
MCHC RBC AUTO-ENTMCNC: 29.1 G/DL (ref 31–35)
MCV RBC AUTO: 79.3 FL (ref 81–99)
MCV RBC AUTO: 79.6 FL (ref 81–99)
MICROCYTES BLD QL SMEAR: (no result)
MONOCYTES # BLD AUTO: 1.4 10*3/UL (ref 0.2–0.8)
MONOCYTES # BLD AUTO: 1.7 10*3/UL (ref 0.2–0.8)
MONOCYTES NFR BLD AUTO: 5.4 % (ref 4.4–11.3)
MONOCYTES NFR BLD AUTO: 8.1 % (ref 4.4–11.3)
MONOCYTES NFR BLD MANUAL: 9 % (ref 3.4–9)
NEUTS BAND NFR BLD MANUAL: 3 %
NEUTS SEG NFR BLD AUTO: 84.9 % (ref 38.7–80)
NEUTS SEG NFR BLD AUTO: 90.5 % (ref 38.7–80)
NEUTS SEG NFR BLD MANUAL: 81 % (ref 40–74)
PLAT MORPH BLD: (no result)
PLATELET # BLD AUTO: 310 X10E3/UL (ref 140–360)
PLATELET # BLD AUTO: 338 X10E3/UL (ref 140–360)
PLATELET # BLD EST: ADEQUATE 10*3/UL
POIKILOCYTOSIS BLD QL SMEAR: (no result)
POLYCHROMASIA BLD QL SMEAR: (no result)
POTASSIUM SERPL-SCNC: 4.2 MMOL/L (ref 3.5–5.1)
POTASSIUM SERPL-SCNC: 4.8 MMOL/L (ref 3.5–5.1)
RBC # BLD AUTO: 4.81 X10E6/UL (ref 4.3–5.7)
RBC # BLD AUTO: 4.82 X10E6/UL (ref 4.3–5.7)
RBC MORPH BLD: (no result)
SCHISTOCYTES BLD QL SMEAR: (no result)
SODIUM SERPL-SCNC: 136 MMOL/L (ref 136–145)
SODIUM SERPL-SCNC: 137 MMOL/L (ref 136–145)

## 2018-02-01 RX ADMIN — LORAZEPAM PRN MG: 2 INJECTION INTRAMUSCULAR; INTRAVENOUS at 20:40

## 2018-02-01 RX ADMIN — Medication PRN MG: at 00:29

## 2018-02-01 RX ADMIN — SODIUM CHLORIDE SCH ML: 900 IRRIGANT IRRIGATION at 00:52

## 2018-02-01 RX ADMIN — SODIUM CHLORIDE SCH ML: 900 IRRIGANT IRRIGATION at 15:44

## 2018-02-01 RX ADMIN — SODIUM CHLORIDE, SODIUM LACTATE, POTASSIUM CHLORIDE, CALCIUM CHLORIDE AND DEXTROSE MONOHYDRATE SCH MLS/HR: 5; 600; 310; 30; 20 INJECTION, SOLUTION INTRAVENOUS at 22:00

## 2018-02-01 RX ADMIN — SODIUM CHLORIDE SCH MG: 900 INJECTION INTRAVENOUS at 16:20

## 2018-02-01 RX ADMIN — TAZOBACTAM SODIUM AND PIPERACILLIN SODIUM SCH MLS/HR: 375; 3 INJECTION, SOLUTION INTRAVENOUS at 14:51

## 2018-02-01 RX ADMIN — ACETAMINOPHEN PRN MG: 10 INJECTION, SOLUTION INTRAVENOUS at 00:53

## 2018-02-01 RX ADMIN — METRONIDAZOLE SCH MLS/HR: 500 INJECTION, SOLUTION INTRAVENOUS at 08:51

## 2018-02-01 RX ADMIN — SODIUM CHLORIDE SCH ML: 900 IRRIGANT IRRIGATION at 08:51

## 2018-02-01 RX ADMIN — SODIUM CHLORIDE SCH ML: 900 IRRIGANT IRRIGATION at 05:03

## 2018-02-01 RX ADMIN — TAZOBACTAM SODIUM AND PIPERACILLIN SODIUM SCH MLS/HR: 375; 3 INJECTION, SOLUTION INTRAVENOUS at 06:15

## 2018-02-01 RX ADMIN — SODIUM CHLORIDE SCH ML: 900 IRRIGANT IRRIGATION at 20:40

## 2018-02-01 RX ADMIN — Medication PRN MG: at 09:00

## 2018-02-01 RX ADMIN — SODIUM CHLORIDE, SODIUM LACTATE, POTASSIUM CHLORIDE, CALCIUM CHLORIDE AND DEXTROSE MONOHYDRATE SCH MLS/HR: 5; 600; 310; 30; 20 INJECTION, SOLUTION INTRAVENOUS at 02:40

## 2018-02-01 RX ADMIN — METRONIDAZOLE SCH MLS/HR: 500 INJECTION, SOLUTION INTRAVENOUS at 00:52

## 2018-02-01 RX ADMIN — SODIUM CHLORIDE, SODIUM LACTATE, POTASSIUM CHLORIDE, CALCIUM CHLORIDE AND DEXTROSE MONOHYDRATE SCH MLS/HR: 5; 600; 310; 30; 20 INJECTION, SOLUTION INTRAVENOUS at 12:27

## 2018-02-01 RX ADMIN — SODIUM CHLORIDE SCH ML: 900 IRRIGANT IRRIGATION at 13:26

## 2018-02-01 RX ADMIN — LORAZEPAM PRN MG: 2 INJECTION INTRAMUSCULAR; INTRAVENOUS at 13:27

## 2018-02-02 VITALS — DIASTOLIC BLOOD PRESSURE: 73 MMHG | SYSTOLIC BLOOD PRESSURE: 126 MMHG

## 2018-02-02 VITALS — DIASTOLIC BLOOD PRESSURE: 76 MMHG | SYSTOLIC BLOOD PRESSURE: 129 MMHG

## 2018-02-02 VITALS — SYSTOLIC BLOOD PRESSURE: 118 MMHG | DIASTOLIC BLOOD PRESSURE: 63 MMHG

## 2018-02-02 VITALS — SYSTOLIC BLOOD PRESSURE: 117 MMHG | DIASTOLIC BLOOD PRESSURE: 64 MMHG

## 2018-02-02 VITALS — DIASTOLIC BLOOD PRESSURE: 61 MMHG | SYSTOLIC BLOOD PRESSURE: 119 MMHG

## 2018-02-02 VITALS — DIASTOLIC BLOOD PRESSURE: 64 MMHG | SYSTOLIC BLOOD PRESSURE: 118 MMHG

## 2018-02-02 LAB
ALBUMIN SERPL-MCNC: 1.3 G/DL (ref 3.5–5)
ALBUMIN/GLOB SERPL: 0.5 {RATIO} (ref 0.8–2)
ALP SERPL-CCNC: 47 IU/L (ref 40–150)
ALT SERPL-CCNC: 6 IU/L (ref 0–55)
ANION GAP SERPL CALC-SCNC: 8.9 MMOL/L (ref 8–16)
BASOPHILS # BLD AUTO: 0 10*3/UL (ref 0–0.1)
BASOPHILS NFR BLD AUTO: 0.1 % (ref 0–1)
BUN SERPL-MCNC: 10 MG/DL (ref 7–26)
BUN/CREAT SERPL: 14 (ref 6–25)
CALCIUM SERPL-MCNC: 7.7 MG/DL (ref 8.4–10.2)
CHLORIDE SERPL-SCNC: 106 MMOL/L (ref 98–107)
CO2 SERPL-SCNC: 24 MMOL/L (ref 22–29)
DEPRECATED NEUTROPHILS # BLD AUTO: 13.4 10*3/UL (ref 2.1–6.9)
EGFRCR SERPLBLD CKD-EPI 2021: > 60 ML/MIN (ref 60–?)
EOSINOPHIL # BLD AUTO: 0.2 10*3/UL (ref 0–0.4)
EOSINOPHIL NFR BLD AUTO: 1 % (ref 0–6)
ERYTHROCYTE [DISTWIDTH] IN CORD BLOOD: 30.5 % (ref 11.7–14.4)
GLOBULIN PLAS-MCNC: 2.7 G/DL (ref 2.3–3.5)
GLUCOSE SERPLBLD-MCNC: 102 MG/DL (ref 74–118)
HCT VFR BLD AUTO: 31.6 % (ref 38.2–49.6)
HGB BLD-MCNC: 9.2 G/DL (ref 14–18)
LYMPHOCYTES # BLD: 0.7 10*3/UL (ref 1–3.2)
LYMPHOCYTES NFR BLD AUTO: 4.6 % (ref 18–39.1)
MCH RBC QN AUTO: 23.2 PG (ref 28–32)
MCHC RBC AUTO-ENTMCNC: 29.1 G/DL (ref 31–35)
MCV RBC AUTO: 79.8 FL (ref 81–99)
MONOCYTES # BLD AUTO: 1.8 10*3/UL (ref 0.2–0.8)
MONOCYTES NFR BLD AUTO: 11.1 % (ref 4.4–11.3)
NEUTS SEG NFR BLD AUTO: 82.3 % (ref 38.7–80)
PLATELET # BLD AUTO: 224 X10E3/UL (ref 140–360)
POTASSIUM SERPL-SCNC: 3.9 MMOL/L (ref 3.5–5.1)
RBC # BLD AUTO: 3.96 X10E6/UL (ref 4.3–5.7)
SODIUM SERPL-SCNC: 135 MMOL/L (ref 136–145)

## 2018-02-02 RX ADMIN — SODIUM CHLORIDE, SODIUM LACTATE, POTASSIUM CHLORIDE, CALCIUM CHLORIDE AND DEXTROSE MONOHYDRATE SCH MLS/HR: 5; 600; 310; 30; 20 INJECTION, SOLUTION INTRAVENOUS at 06:35

## 2018-02-02 RX ADMIN — SODIUM CHLORIDE SCH ML: 900 IRRIGANT IRRIGATION at 21:43

## 2018-02-02 RX ADMIN — SODIUM CHLORIDE SCH ML: 900 IRRIGANT IRRIGATION at 00:15

## 2018-02-02 RX ADMIN — SODIUM CHLORIDE SCH ML: 900 IRRIGANT IRRIGATION at 16:30

## 2018-02-02 RX ADMIN — LORAZEPAM PRN MG: 2 INJECTION INTRAMUSCULAR; INTRAVENOUS at 08:44

## 2018-02-02 RX ADMIN — SODIUM CHLORIDE SCH ML: 900 IRRIGANT IRRIGATION at 12:30

## 2018-02-02 RX ADMIN — SODIUM CHLORIDE SCH ML: 900 IRRIGANT IRRIGATION at 05:00

## 2018-02-02 RX ADMIN — LORAZEPAM PRN MG: 2 INJECTION INTRAMUSCULAR; INTRAVENOUS at 23:57

## 2018-02-02 RX ADMIN — SODIUM CHLORIDE, SODIUM LACTATE, POTASSIUM CHLORIDE, CALCIUM CHLORIDE AND DEXTROSE MONOHYDRATE SCH MLS/HR: 5; 600; 310; 30; 20 INJECTION, SOLUTION INTRAVENOUS at 17:10

## 2018-02-02 RX ADMIN — Medication PRN MG: at 09:56

## 2018-02-02 RX ADMIN — SODIUM CHLORIDE SCH ML: 900 IRRIGANT IRRIGATION at 08:44

## 2018-02-02 RX ADMIN — LORAZEPAM PRN MG: 2 INJECTION INTRAMUSCULAR; INTRAVENOUS at 16:55

## 2018-02-02 RX ADMIN — SODIUM CHLORIDE SCH MG: 900 INJECTION INTRAVENOUS at 16:52

## 2018-02-03 VITALS — SYSTOLIC BLOOD PRESSURE: 128 MMHG | DIASTOLIC BLOOD PRESSURE: 61 MMHG

## 2018-02-03 VITALS — DIASTOLIC BLOOD PRESSURE: 74 MMHG | SYSTOLIC BLOOD PRESSURE: 127 MMHG

## 2018-02-03 VITALS — SYSTOLIC BLOOD PRESSURE: 119 MMHG | DIASTOLIC BLOOD PRESSURE: 73 MMHG

## 2018-02-03 VITALS — SYSTOLIC BLOOD PRESSURE: 138 MMHG | DIASTOLIC BLOOD PRESSURE: 80 MMHG

## 2018-02-03 VITALS — SYSTOLIC BLOOD PRESSURE: 128 MMHG | DIASTOLIC BLOOD PRESSURE: 68 MMHG

## 2018-02-03 VITALS — SYSTOLIC BLOOD PRESSURE: 123 MMHG | DIASTOLIC BLOOD PRESSURE: 75 MMHG

## 2018-02-03 LAB
ALBUMIN SERPL-MCNC: 1.2 G/DL (ref 3.5–5)
ALBUMIN/GLOB SERPL: 0.4 {RATIO} (ref 0.8–2)
ALP SERPL-CCNC: 48 IU/L (ref 40–150)
ALT SERPL-CCNC: 6 IU/L (ref 0–55)
ANION GAP SERPL CALC-SCNC: 7.5 MMOL/L (ref 8–16)
BASOPHILS # BLD AUTO: 0 10*3/UL (ref 0–0.1)
BASOPHILS NFR BLD AUTO: 0.1 % (ref 0–1)
BUN SERPL-MCNC: 7 MG/DL (ref 7–26)
BUN/CREAT SERPL: 11 (ref 6–25)
CALCIUM SERPL-MCNC: 7.5 MG/DL (ref 8.4–10.2)
CHLORIDE SERPL-SCNC: 104 MMOL/L (ref 98–107)
CO2 SERPL-SCNC: 26 MMOL/L (ref 22–29)
DEPRECATED NEUTROPHILS # BLD AUTO: 9.7 10*3/UL (ref 2.1–6.9)
EGFRCR SERPLBLD CKD-EPI 2021: > 60 ML/MIN (ref 60–?)
EOSINOPHIL # BLD AUTO: 0.2 10*3/UL (ref 0–0.4)
EOSINOPHIL NFR BLD AUTO: 1.3 % (ref 0–6)
ERYTHROCYTE [DISTWIDTH] IN CORD BLOOD: 30.1 % (ref 11.7–14.4)
GLOBULIN PLAS-MCNC: 3.2 G/DL (ref 2.3–3.5)
GLUCOSE SERPLBLD-MCNC: 88 MG/DL (ref 74–118)
HCT VFR BLD AUTO: 30.8 % (ref 38.2–49.6)
HGB BLD-MCNC: 9 G/DL (ref 14–18)
LYMPHOCYTES # BLD: 0.6 10*3/UL (ref 1–3.2)
LYMPHOCYTES NFR BLD AUTO: 4.9 % (ref 18–39.1)
LYMPHOCYTES NFR BLD MANUAL: 5 % (ref 19–48)
MCH RBC QN AUTO: 23.4 PG (ref 28–32)
MCHC RBC AUTO-ENTMCNC: 29.2 G/DL (ref 31–35)
MCV RBC AUTO: 80.2 FL (ref 81–99)
MONOCYTES # BLD AUTO: 1 10*3/UL (ref 0.2–0.8)
MONOCYTES NFR BLD AUTO: 8.5 % (ref 4.4–11.3)
MONOCYTES NFR BLD MANUAL: 10 % (ref 3.4–9)
NEUTS SEG NFR BLD AUTO: 83.2 % (ref 38.7–80)
NEUTS SEG NFR BLD MANUAL: 85 % (ref 40–74)
PLAT MORPH BLD: NORMAL
PLATELET # BLD AUTO: 187 X10E3/UL (ref 140–360)
PLATELET # BLD EST: ADEQUATE 10*3/UL
POTASSIUM SERPL-SCNC: 3.5 MMOL/L (ref 3.5–5.1)
RBC # BLD AUTO: 3.84 X10E6/UL (ref 4.3–5.7)
RBC MORPH BLD: NORMAL
SODIUM SERPL-SCNC: 134 MMOL/L (ref 136–145)

## 2018-02-03 RX ADMIN — Medication PRN MG: at 01:00

## 2018-02-03 RX ADMIN — SODIUM CHLORIDE, SODIUM LACTATE, POTASSIUM CHLORIDE, CALCIUM CHLORIDE AND DEXTROSE MONOHYDRATE SCH MLS/HR: 5; 600; 310; 30; 20 INJECTION, SOLUTION INTRAVENOUS at 01:41

## 2018-02-03 RX ADMIN — SODIUM CHLORIDE SCH MG: 900 INJECTION INTRAVENOUS at 17:26

## 2018-02-03 RX ADMIN — SODIUM CHLORIDE SCH ML: 900 IRRIGANT IRRIGATION at 08:30

## 2018-02-03 RX ADMIN — SODIUM CHLORIDE, SODIUM LACTATE, POTASSIUM CHLORIDE, CALCIUM CHLORIDE AND DEXTROSE MONOHYDRATE SCH MLS/HR: 5; 600; 310; 30; 20 INJECTION, SOLUTION INTRAVENOUS at 20:29

## 2018-02-03 RX ADMIN — SODIUM CHLORIDE, SODIUM LACTATE, POTASSIUM CHLORIDE, CALCIUM CHLORIDE AND DEXTROSE MONOHYDRATE SCH MLS/HR: 5; 600; 310; 30; 20 INJECTION, SOLUTION INTRAVENOUS at 10:22

## 2018-02-03 RX ADMIN — SODIUM CHLORIDE SCH ML: 900 IRRIGANT IRRIGATION at 12:30

## 2018-02-03 RX ADMIN — SODIUM CHLORIDE SCH ML: 900 IRRIGANT IRRIGATION at 01:30

## 2018-02-03 RX ADMIN — LORAZEPAM PRN MG: 2 INJECTION INTRAMUSCULAR; INTRAVENOUS at 18:40

## 2018-02-03 RX ADMIN — LORAZEPAM PRN MG: 2 INJECTION INTRAMUSCULAR; INTRAVENOUS at 06:24

## 2018-02-03 RX ADMIN — LORAZEPAM PRN MG: 2 INJECTION INTRAMUSCULAR; INTRAVENOUS at 12:40

## 2018-02-03 RX ADMIN — SODIUM CHLORIDE SCH ML: 900 IRRIGANT IRRIGATION at 05:30

## 2018-02-03 RX ADMIN — SODIUM CHLORIDE SCH ML: 900 IRRIGANT IRRIGATION at 16:30

## 2018-02-04 VITALS — DIASTOLIC BLOOD PRESSURE: 77 MMHG | SYSTOLIC BLOOD PRESSURE: 124 MMHG

## 2018-02-04 VITALS — DIASTOLIC BLOOD PRESSURE: 78 MMHG | SYSTOLIC BLOOD PRESSURE: 130 MMHG

## 2018-02-04 VITALS — DIASTOLIC BLOOD PRESSURE: 88 MMHG | SYSTOLIC BLOOD PRESSURE: 139 MMHG

## 2018-02-04 VITALS — SYSTOLIC BLOOD PRESSURE: 132 MMHG | DIASTOLIC BLOOD PRESSURE: 81 MMHG

## 2018-02-04 VITALS — SYSTOLIC BLOOD PRESSURE: 139 MMHG | DIASTOLIC BLOOD PRESSURE: 88 MMHG

## 2018-02-04 RX ADMIN — HYDROMORPHONE HYDROCHLORIDE PRN MG: 2 INJECTION INTRAMUSCULAR; INTRAVENOUS; SUBCUTANEOUS at 21:37

## 2018-02-04 RX ADMIN — HYDROMORPHONE HYDROCHLORIDE PRN MG: 2 INJECTION INTRAMUSCULAR; INTRAVENOUS; SUBCUTANEOUS at 18:12

## 2018-02-04 RX ADMIN — SODIUM CHLORIDE, SODIUM LACTATE, POTASSIUM CHLORIDE, CALCIUM CHLORIDE AND DEXTROSE MONOHYDRATE SCH MLS/HR: 5; 600; 310; 30; 20 INJECTION, SOLUTION INTRAVENOUS at 22:52

## 2018-02-04 RX ADMIN — LORAZEPAM PRN MG: 2 INJECTION INTRAMUSCULAR; INTRAVENOUS at 23:35

## 2018-02-04 RX ADMIN — SODIUM CHLORIDE SCH MG: 900 INJECTION INTRAVENOUS at 16:51

## 2018-02-04 RX ADMIN — Medication PRN MG: at 05:02

## 2018-02-04 RX ADMIN — LORAZEPAM PRN MG: 2 INJECTION INTRAMUSCULAR; INTRAVENOUS at 14:15

## 2018-02-04 RX ADMIN — SODIUM CHLORIDE, SODIUM LACTATE, POTASSIUM CHLORIDE, CALCIUM CHLORIDE AND DEXTROSE MONOHYDRATE SCH MLS/HR: 5; 600; 310; 30; 20 INJECTION, SOLUTION INTRAVENOUS at 05:01

## 2018-02-04 RX ADMIN — LORAZEPAM PRN MG: 2 INJECTION INTRAMUSCULAR; INTRAVENOUS at 06:39

## 2018-02-04 RX ADMIN — LORAZEPAM PRN MG: 2 INJECTION INTRAMUSCULAR; INTRAVENOUS at 00:30

## 2018-02-05 VITALS — SYSTOLIC BLOOD PRESSURE: 135 MMHG | DIASTOLIC BLOOD PRESSURE: 88 MMHG

## 2018-02-05 VITALS — SYSTOLIC BLOOD PRESSURE: 136 MMHG | DIASTOLIC BLOOD PRESSURE: 91 MMHG

## 2018-02-05 VITALS — DIASTOLIC BLOOD PRESSURE: 79 MMHG | SYSTOLIC BLOOD PRESSURE: 131 MMHG

## 2018-02-05 VITALS — DIASTOLIC BLOOD PRESSURE: 86 MMHG | SYSTOLIC BLOOD PRESSURE: 133 MMHG

## 2018-02-05 VITALS — DIASTOLIC BLOOD PRESSURE: 85 MMHG | SYSTOLIC BLOOD PRESSURE: 141 MMHG

## 2018-02-05 VITALS — DIASTOLIC BLOOD PRESSURE: 81 MMHG | SYSTOLIC BLOOD PRESSURE: 119 MMHG

## 2018-02-05 RX ADMIN — HYDROCODONE BITARTRATE AND ACETAMINOPHEN PRN EA: 7.5; 325 TABLET ORAL at 19:11

## 2018-02-05 RX ADMIN — SODIUM CHLORIDE, SODIUM LACTATE, POTASSIUM CHLORIDE, CALCIUM CHLORIDE AND DEXTROSE MONOHYDRATE SCH MLS/HR: 5; 600; 310; 30; 20 INJECTION, SOLUTION INTRAVENOUS at 11:22

## 2018-02-05 RX ADMIN — SODIUM CHLORIDE SCH MG: 900 INJECTION INTRAVENOUS at 16:52

## 2018-02-05 RX ADMIN — LORAZEPAM PRN MG: 2 INJECTION INTRAMUSCULAR; INTRAVENOUS at 12:51

## 2018-02-05 RX ADMIN — HYDROMORPHONE HYDROCHLORIDE PRN MG: 2 INJECTION INTRAMUSCULAR; INTRAVENOUS; SUBCUTANEOUS at 00:30

## 2018-02-05 RX ADMIN — HYDROCODONE BITARTRATE AND ACETAMINOPHEN PRN EA: 7.5; 325 TABLET ORAL at 06:38

## 2018-02-05 RX ADMIN — HYDROCODONE BITARTRATE AND ACETAMINOPHEN PRN EA: 7.5; 325 TABLET ORAL at 15:01

## 2018-02-05 RX ADMIN — HYDROCODONE BITARTRATE AND ACETAMINOPHEN PRN EA: 7.5; 325 TABLET ORAL at 11:01

## 2018-02-05 RX ADMIN — HYDROCODONE BITARTRATE AND ACETAMINOPHEN PRN EA: 7.5; 325 TABLET ORAL at 23:04

## 2018-02-05 RX ADMIN — HYDROMORPHONE HYDROCHLORIDE PRN MG: 2 INJECTION INTRAMUSCULAR; INTRAVENOUS; SUBCUTANEOUS at 03:33

## 2018-02-06 VITALS — SYSTOLIC BLOOD PRESSURE: 124 MMHG | DIASTOLIC BLOOD PRESSURE: 78 MMHG

## 2018-02-06 VITALS — SYSTOLIC BLOOD PRESSURE: 130 MMHG | DIASTOLIC BLOOD PRESSURE: 86 MMHG

## 2018-02-06 VITALS — DIASTOLIC BLOOD PRESSURE: 79 MMHG | SYSTOLIC BLOOD PRESSURE: 129 MMHG

## 2018-02-06 VITALS — SYSTOLIC BLOOD PRESSURE: 139 MMHG | DIASTOLIC BLOOD PRESSURE: 85 MMHG

## 2018-02-06 VITALS — SYSTOLIC BLOOD PRESSURE: 129 MMHG | DIASTOLIC BLOOD PRESSURE: 79 MMHG

## 2018-02-06 VITALS — SYSTOLIC BLOOD PRESSURE: 128 MMHG | DIASTOLIC BLOOD PRESSURE: 78 MMHG

## 2018-02-06 VITALS — DIASTOLIC BLOOD PRESSURE: 75 MMHG | SYSTOLIC BLOOD PRESSURE: 130 MMHG

## 2018-02-06 RX ADMIN — SODIUM CHLORIDE, SODIUM LACTATE, POTASSIUM CHLORIDE, CALCIUM CHLORIDE AND DEXTROSE MONOHYDRATE SCH MLS/HR: 5; 600; 310; 30; 20 INJECTION, SOLUTION INTRAVENOUS at 00:44

## 2018-02-06 RX ADMIN — LORAZEPAM PRN MG: 2 INJECTION INTRAMUSCULAR; INTRAVENOUS at 08:54

## 2018-02-06 RX ADMIN — HYDROCODONE BITARTRATE AND ACETAMINOPHEN PRN EA: 7.5; 325 TABLET ORAL at 07:35

## 2018-02-06 RX ADMIN — HYDROCODONE BITARTRATE AND ACETAMINOPHEN PRN EA: 7.5; 325 TABLET ORAL at 11:31

## 2018-02-06 RX ADMIN — HYDROCODONE BITARTRATE AND ACETAMINOPHEN PRN EA: 7.5; 325 TABLET ORAL at 15:35

## 2018-02-06 RX ADMIN — LORAZEPAM PRN MG: 2 INJECTION INTRAMUSCULAR; INTRAVENOUS at 23:35

## 2018-02-06 RX ADMIN — LORAZEPAM PRN MG: 2 INJECTION INTRAMUSCULAR; INTRAVENOUS at 02:22

## 2018-02-06 RX ADMIN — HYDROMORPHONE HYDROCHLORIDE PRN MG: 2 INJECTION INTRAMUSCULAR; INTRAVENOUS; SUBCUTANEOUS at 20:02

## 2018-02-06 RX ADMIN — SODIUM CHLORIDE, SODIUM LACTATE, POTASSIUM CHLORIDE, CALCIUM CHLORIDE AND DEXTROSE MONOHYDRATE SCH MLS/HR: 5; 600; 310; 30; 20 INJECTION, SOLUTION INTRAVENOUS at 15:02

## 2018-02-06 RX ADMIN — SODIUM CHLORIDE SCH MG: 900 INJECTION INTRAVENOUS at 16:25

## 2018-02-06 RX ADMIN — LORAZEPAM PRN MG: 2 INJECTION INTRAMUSCULAR; INTRAVENOUS at 17:40

## 2018-02-07 VITALS — SYSTOLIC BLOOD PRESSURE: 122 MMHG | DIASTOLIC BLOOD PRESSURE: 80 MMHG

## 2018-02-07 VITALS — DIASTOLIC BLOOD PRESSURE: 79 MMHG | SYSTOLIC BLOOD PRESSURE: 127 MMHG

## 2018-02-07 VITALS — SYSTOLIC BLOOD PRESSURE: 130 MMHG | DIASTOLIC BLOOD PRESSURE: 82 MMHG

## 2018-02-07 VITALS — DIASTOLIC BLOOD PRESSURE: 84 MMHG | SYSTOLIC BLOOD PRESSURE: 132 MMHG

## 2018-02-07 VITALS — SYSTOLIC BLOOD PRESSURE: 131 MMHG | DIASTOLIC BLOOD PRESSURE: 81 MMHG

## 2018-02-07 VITALS — DIASTOLIC BLOOD PRESSURE: 80 MMHG | SYSTOLIC BLOOD PRESSURE: 122 MMHG

## 2018-02-07 VITALS — DIASTOLIC BLOOD PRESSURE: 80 MMHG | SYSTOLIC BLOOD PRESSURE: 129 MMHG

## 2018-02-07 PROCEDURE — 02HV33Z INSERTION OF INFUSION DEVICE INTO SUPERIOR VENA CAVA, PERCUTANEOUS APPROACH: ICD-10-PCS | Performed by: SURGERY

## 2018-02-07 PROCEDURE — 0JH63XZ INSERTION OF TUNNELED VASCULAR ACCESS DEVICE INTO CHEST SUBCUTANEOUS TISSUE AND FASCIA, PERCUTANEOUS APPROACH: ICD-10-PCS | Performed by: SURGERY

## 2018-02-07 RX ADMIN — HYDROCODONE BITARTRATE AND ACETAMINOPHEN PRN EA: 7.5; 325 TABLET ORAL at 16:20

## 2018-02-07 RX ADMIN — SODIUM CHLORIDE, SODIUM LACTATE, POTASSIUM CHLORIDE, CALCIUM CHLORIDE AND DEXTROSE MONOHYDRATE SCH MLS/HR: 5; 600; 310; 30; 20 INJECTION, SOLUTION INTRAVENOUS at 03:21

## 2018-02-07 RX ADMIN — SODIUM CHLORIDE SCH MG: 900 INJECTION INTRAVENOUS at 16:25

## 2018-02-07 RX ADMIN — HYDROCODONE BITARTRATE AND ACETAMINOPHEN PRN EA: 7.5; 325 TABLET ORAL at 22:50

## 2018-02-07 RX ADMIN — HYDROMORPHONE HYDROCHLORIDE PRN MG: 2 INJECTION INTRAMUSCULAR; INTRAVENOUS; SUBCUTANEOUS at 09:05

## 2018-02-07 RX ADMIN — LORAZEPAM PRN MG: 2 INJECTION INTRAMUSCULAR; INTRAVENOUS at 22:50

## 2018-02-07 RX ADMIN — SODIUM CHLORIDE PRN MG: 900 INJECTION INTRAVENOUS at 09:05

## 2018-02-07 RX ADMIN — LORAZEPAM PRN MG: 2 INJECTION INTRAMUSCULAR; INTRAVENOUS at 14:13

## 2018-02-07 RX ADMIN — SODIUM CHLORIDE, SODIUM LACTATE, POTASSIUM CHLORIDE, CALCIUM CHLORIDE AND DEXTROSE MONOHYDRATE SCH MLS/HR: 5; 600; 310; 30; 20 INJECTION, SOLUTION INTRAVENOUS at 17:32

## 2018-02-07 RX ADMIN — SODIUM CHLORIDE SCH GM: 9 INJECTION, SOLUTION INTRAVENOUS at 20:17

## 2018-02-07 RX ADMIN — SODIUM CHLORIDE, SODIUM LACTATE, POTASSIUM CHLORIDE, CALCIUM CHLORIDE AND DEXTROSE MONOHYDRATE SCH MLS/HR: 5; 600; 310; 30; 20 INJECTION, SOLUTION INTRAVENOUS at 21:03

## 2018-02-07 RX ADMIN — HYDROMORPHONE HYDROCHLORIDE PRN MG: 2 INJECTION INTRAMUSCULAR; INTRAVENOUS; SUBCUTANEOUS at 03:21

## 2018-02-07 NOTE — OPERATIVE REPORT
DATE OF PROCEDURE:  February 07, 2018



PREOPERATIVE DIAGNOSIS:  Colorectal cancer, needing intravenous access for 

chemotherapy.



POSTOPERATIVE DIAGNOSIS:  Colorectal cancer, needing intravenous access for 

chemotherapy.



OPERATION PERFORMED:  Placement of left subclavian venous access port under 

C-arm guidance.



ANESTHESIA:  General.



COMPLICATIONS:  None.



ESTIMATED BLOOD LOSS:  Minimal.



DESCRIPTION OF PROCEDURE:  With the patient lying in bed in the 

Trendelenburg position under good general anesthesia, the left chest was 

prepped with Betadine solution and draped in the usual manner.  A standard 

left subclavian venipuncture was performed without any difficulty and a 

guidewire was advanced into central venous position.  The tip of the 

guidewire was confirmed to be at the level of the superior vena cava with 

the C-arm and the left lung was fully expanded.  A pocket was then created 

in the left anterior chest to accept the reservoir.  The catheter was 

threaded to the subclavian position and cut to the appropriate length.  The 

reservoir and catheter were fully heparinized.  The peel-away sheath 

introducer was placed over the guidewire and the guidewire was removed.  

The catheter was threaded through the peel-away sheath and the peel-away 

sheath was removed without any problems.  There was good blood return and 

the reservoir and catheter were fully heparinized.  Using the C-arm, the 

tip of the catheter was confirmed to be at the level of the superior vena 

cava and the left lung was fully expanded.





The wounds were then closed in layers.  The subcutaneous tissue was 

approximated with 3-0 and 4-0 Vicryl and the skin was closed with 

subcuticular 5-0 Vicryl.  Benzoin, Steri-Strips, and dressings were 

applied.  The sponge, lap, and needle count was correct.  The patient 

tolerated the procedure well and returned to the recovery room in stable 

condition.









DD:  02/07/2018 12:56

DT:  02/07/2018 13:50

Job#:  [id number] ANTHONY

## 2018-02-08 VITALS — DIASTOLIC BLOOD PRESSURE: 82 MMHG | SYSTOLIC BLOOD PRESSURE: 131 MMHG

## 2018-02-08 VITALS — SYSTOLIC BLOOD PRESSURE: 137 MMHG | DIASTOLIC BLOOD PRESSURE: 88 MMHG

## 2018-02-08 VITALS — DIASTOLIC BLOOD PRESSURE: 93 MMHG | SYSTOLIC BLOOD PRESSURE: 147 MMHG

## 2018-02-08 VITALS — DIASTOLIC BLOOD PRESSURE: 62 MMHG | SYSTOLIC BLOOD PRESSURE: 119 MMHG

## 2018-02-08 VITALS — SYSTOLIC BLOOD PRESSURE: 130 MMHG | DIASTOLIC BLOOD PRESSURE: 82 MMHG

## 2018-02-08 VITALS — SYSTOLIC BLOOD PRESSURE: 138 MMHG | DIASTOLIC BLOOD PRESSURE: 82 MMHG

## 2018-02-08 RX ADMIN — HYDROMORPHONE HYDROCHLORIDE PRN MG: 2 INJECTION INTRAMUSCULAR; INTRAVENOUS; SUBCUTANEOUS at 14:07

## 2018-02-08 RX ADMIN — HYDROMORPHONE HYDROCHLORIDE PRN MG: 2 INJECTION INTRAMUSCULAR; INTRAVENOUS; SUBCUTANEOUS at 19:24

## 2018-02-08 RX ADMIN — HYDROCODONE BITARTRATE AND ACETAMINOPHEN PRN EA: 7.5; 325 TABLET ORAL at 12:58

## 2018-02-08 RX ADMIN — SODIUM CHLORIDE SCH MG: 900 INJECTION INTRAVENOUS at 16:51

## 2018-02-08 RX ADMIN — HYDROMORPHONE HYDROCHLORIDE PRN MG: 2 INJECTION INTRAMUSCULAR; INTRAVENOUS; SUBCUTANEOUS at 05:15

## 2018-02-08 RX ADMIN — SODIUM CHLORIDE PRN MG: 900 INJECTION INTRAVENOUS at 14:07

## 2018-02-08 RX ADMIN — HYDROCODONE BITARTRATE AND ACETAMINOPHEN PRN EA: 7.5; 325 TABLET ORAL at 22:14

## 2018-02-08 RX ADMIN — SODIUM CHLORIDE SCH GM: 9 INJECTION, SOLUTION INTRAVENOUS at 04:45

## 2018-02-08 RX ADMIN — SODIUM CHLORIDE, SODIUM LACTATE, POTASSIUM CHLORIDE, CALCIUM CHLORIDE AND DEXTROSE MONOHYDRATE SCH MLS/HR: 5; 600; 310; 30; 20 INJECTION, SOLUTION INTRAVENOUS at 21:32

## 2018-02-08 RX ADMIN — HYDROMORPHONE HYDROCHLORIDE PRN MG: 2 INJECTION INTRAMUSCULAR; INTRAVENOUS; SUBCUTANEOUS at 02:08

## 2018-02-08 RX ADMIN — HYDROCODONE BITARTRATE AND ACETAMINOPHEN PRN EA: 7.5; 325 TABLET ORAL at 17:29

## 2018-02-09 VITALS — SYSTOLIC BLOOD PRESSURE: 138 MMHG | DIASTOLIC BLOOD PRESSURE: 81 MMHG

## 2018-02-09 VITALS — DIASTOLIC BLOOD PRESSURE: 87 MMHG | SYSTOLIC BLOOD PRESSURE: 150 MMHG

## 2018-02-09 VITALS — DIASTOLIC BLOOD PRESSURE: 84 MMHG | SYSTOLIC BLOOD PRESSURE: 144 MMHG

## 2018-02-09 RX ADMIN — HYDROMORPHONE HYDROCHLORIDE PRN MG: 2 INJECTION INTRAMUSCULAR; INTRAVENOUS; SUBCUTANEOUS at 01:06

## 2018-02-09 RX ADMIN — HYDROMORPHONE HYDROCHLORIDE PRN MG: 2 INJECTION INTRAMUSCULAR; INTRAVENOUS; SUBCUTANEOUS at 06:30

## 2018-02-09 RX ADMIN — HYDROCODONE BITARTRATE AND ACETAMINOPHEN PRN EA: 7.5; 325 TABLET ORAL at 08:32

## 2018-02-09 RX ADMIN — HYDROCODONE BITARTRATE AND ACETAMINOPHEN PRN EA: 7.5; 325 TABLET ORAL at 03:41

## 2018-02-09 RX ADMIN — LORAZEPAM PRN MG: 2 INJECTION INTRAMUSCULAR; INTRAVENOUS at 00:00

## 2018-06-01 ENCOUNTER — HOSPITAL ENCOUNTER (INPATIENT)
Dept: HOSPITAL 88 - ER | Age: 48
LOS: 20 days | DRG: 329 | End: 2018-06-21
Attending: INTERNAL MEDICINE | Admitting: INTERNAL MEDICINE
Payer: COMMERCIAL

## 2018-06-01 VITALS — SYSTOLIC BLOOD PRESSURE: 141 MMHG | DIASTOLIC BLOOD PRESSURE: 98 MMHG

## 2018-06-01 VITALS — HEIGHT: 72 IN | BODY MASS INDEX: 28.31 KG/M2 | WEIGHT: 209 LBS

## 2018-06-01 VITALS — SYSTOLIC BLOOD PRESSURE: 143 MMHG | DIASTOLIC BLOOD PRESSURE: 97 MMHG

## 2018-06-01 VITALS — SYSTOLIC BLOOD PRESSURE: 149 MMHG | DIASTOLIC BLOOD PRESSURE: 100 MMHG

## 2018-06-01 DIAGNOSIS — D61.818: ICD-10-CM

## 2018-06-01 DIAGNOSIS — E87.6: ICD-10-CM

## 2018-06-01 DIAGNOSIS — N17.0: ICD-10-CM

## 2018-06-01 DIAGNOSIS — A41.9: ICD-10-CM

## 2018-06-01 DIAGNOSIS — D70.9: ICD-10-CM

## 2018-06-01 DIAGNOSIS — F41.9: ICD-10-CM

## 2018-06-01 DIAGNOSIS — E88.09: ICD-10-CM

## 2018-06-01 DIAGNOSIS — I12.9: ICD-10-CM

## 2018-06-01 DIAGNOSIS — J95.821: ICD-10-CM

## 2018-06-01 DIAGNOSIS — C18.9: ICD-10-CM

## 2018-06-01 DIAGNOSIS — N18.9: ICD-10-CM

## 2018-06-01 DIAGNOSIS — C78.6: Primary | ICD-10-CM

## 2018-06-01 DIAGNOSIS — N17.9: ICD-10-CM

## 2018-06-01 DIAGNOSIS — D69.6: ICD-10-CM

## 2018-06-01 DIAGNOSIS — E83.42: ICD-10-CM

## 2018-06-01 DIAGNOSIS — D64.9: ICD-10-CM

## 2018-06-01 DIAGNOSIS — R65.21: ICD-10-CM

## 2018-06-01 LAB
ALBUMIN SERPL-MCNC: 3.4 G/DL (ref 3.5–5)
ALBUMIN SERPL-MCNC: 4.1 G/DL (ref 3.5–5)
ALBUMIN/GLOB SERPL: 0.9 {RATIO} (ref 0.8–2)
ALBUMIN/GLOB SERPL: 0.9 {RATIO} (ref 0.8–2)
ALP SERPL-CCNC: 154 IU/L (ref 40–150)
ALP SERPL-CCNC: 191 IU/L (ref 40–150)
ALT SERPL-CCNC: 30 IU/L (ref 0–55)
ALT SERPL-CCNC: 35 IU/L (ref 0–55)
AMYLASE SERPL-CCNC: 83 U/L (ref 25–125)
ANION GAP SERPL CALC-SCNC: 15.9 MMOL/L (ref 8–16)
ANION GAP SERPL CALC-SCNC: 24.6 MMOL/L (ref 8–16)
ANISOCYTOSIS BLD QL SMEAR: (no result)
BACTERIA URNS QL MICRO: (no result) /HPF
BASOPHILS # BLD AUTO: 0 10*3/UL (ref 0–0.1)
BASOPHILS NFR BLD AUTO: 0.6 % (ref 0–1)
BILIRUB UR QL: NEGATIVE
BUN SERPL-MCNC: 13 MG/DL (ref 7–26)
BUN SERPL-MCNC: 18 MG/DL (ref 7–26)
BUN/CREAT SERPL: 15 (ref 6–25)
BUN/CREAT SERPL: 15 (ref 6–25)
CALCIUM SERPL-MCNC: 10.3 MG/DL (ref 8.4–10.2)
CALCIUM SERPL-MCNC: 9 MG/DL (ref 8.4–10.2)
CHLORIDE SERPL-SCNC: 87 MMOL/L (ref 98–107)
CHLORIDE SERPL-SCNC: 96 MMOL/L (ref 98–107)
CK MB SERPL-MCNC: 0.7 NG/ML (ref 0–5)
CK SERPL-CCNC: 206 IU/L (ref 30–200)
CLARITY UR: (no result)
CO2 SERPL-SCNC: 24 MMOL/L (ref 22–29)
CO2 SERPL-SCNC: 26 MMOL/L (ref 22–29)
COLOR UR: YELLOW
DEPRECATED APTT PLAS QN: 26.1 SECONDS (ref 23.8–35.5)
DEPRECATED INR PLAS: 1.05
DEPRECATED NEUTROPHILS # BLD AUTO: 1.8 10*3/UL (ref 2.1–6.9)
DEPRECATED RBC URNS MANUAL-ACNC: (no result) /HPF (ref 0–5)
EGFRCR SERPLBLD CKD-EPI 2021: > 60 ML/MIN (ref 60–?)
EGFRCR SERPLBLD CKD-EPI 2021: > 60 ML/MIN (ref 60–?)
EOSINOPHIL # BLD AUTO: 0.2 10*3/UL (ref 0–0.4)
EOSINOPHIL NFR BLD AUTO: 5.7 % (ref 0–6)
EPI CELLS URNS QL MICRO: (no result) /LPF
ERYTHROCYTE [DISTWIDTH] IN CORD BLOOD: 24.2 % (ref 11.7–14.4)
GLOBULIN PLAS-MCNC: 3.8 G/DL (ref 2.3–3.5)
GLOBULIN PLAS-MCNC: 4.8 G/DL (ref 2.3–3.5)
GLUCOSE SERPLBLD-MCNC: 108 MG/DL (ref 74–118)
GLUCOSE SERPLBLD-MCNC: 172 MG/DL (ref 74–118)
HCT VFR BLD AUTO: 41.9 % (ref 38.2–49.6)
HGB BLD-MCNC: 14.2 G/DL (ref 14–18)
KETONES UR QL STRIP.AUTO: (no result)
LEUKOCYTE ESTERASE UR QL STRIP.AUTO: NEGATIVE
LIPASE SERPL-CCNC: 22 U/L (ref 8–78)
LYMPHOCYTES # BLD: 0.5 10*3/UL (ref 1–3.2)
LYMPHOCYTES NFR BLD AUTO: 15.3 % (ref 18–39.1)
MAGNESIUM SERPL-MCNC: 1.2 MG/DL (ref 1.3–2.1)
MAGNESIUM SERPL-MCNC: 1.3 MG/DL (ref 1.3–2.1)
MCH RBC QN AUTO: 29.3 PG (ref 28–32)
MCHC RBC AUTO-ENTMCNC: 33.9 G/DL (ref 31–35)
MCV RBC AUTO: 86.4 FL (ref 81–99)
MONOCYTES # BLD AUTO: 0.6 10*3/UL (ref 0.2–0.8)
MONOCYTES NFR BLD AUTO: 18.8 % (ref 4.4–11.3)
NEUTS SEG NFR BLD AUTO: 57.7 % (ref 38.7–80)
NITRITE UR QL STRIP.AUTO: NEGATIVE
PLAT MORPH BLD: (no result)
PLATELET # BLD AUTO: 165 X10E3/UL (ref 140–360)
PLATELET # BLD EST: ADEQUATE 10*3/UL
POIKILOCYTOSIS BLD QL SMEAR: (no result)
POTASSIUM SERPL-SCNC: 2.6 MMOL/L (ref 3.5–5.1)
POTASSIUM SERPL-SCNC: 2.9 MMOL/L (ref 3.5–5.1)
PROT UR QL STRIP.AUTO: (no result)
PROTHROMBIN TIME: 12.9 SECONDS (ref 11.9–14.5)
RBC # BLD AUTO: 4.85 X10E6/UL (ref 4.3–5.7)
RBC MORPH BLD: (no result)
SODIUM SERPL-SCNC: 133 MMOL/L (ref 136–145)
SODIUM SERPL-SCNC: 135 MMOL/L (ref 136–145)
SP GR UR STRIP: 1.01 (ref 1.01–1.02)
UROBILINOGEN UR STRIP-MCNC: 0.2 MG/DL (ref 0.2–1)
WBC #/AREA URNS HPF: (no result) /HPF (ref 0–5)

## 2018-06-01 PROCEDURE — 82805 BLOOD GASES W/O2 SATURATION: CPT

## 2018-06-01 PROCEDURE — 82150 ASSAY OF AMYLASE: CPT

## 2018-06-01 PROCEDURE — 82550 ASSAY OF CK (CPK): CPT

## 2018-06-01 PROCEDURE — 94002 VENT MGMT INPAT INIT DAY: CPT

## 2018-06-01 PROCEDURE — 85025 COMPLETE CBC W/AUTO DIFF WBC: CPT

## 2018-06-01 PROCEDURE — 96365 THER/PROPH/DIAG IV INF INIT: CPT

## 2018-06-01 PROCEDURE — 80048 BASIC METABOLIC PNL TOTAL CA: CPT

## 2018-06-01 PROCEDURE — 83605 ASSAY OF LACTIC ACID: CPT

## 2018-06-01 PROCEDURE — 76937 US GUIDE VASCULAR ACCESS: CPT

## 2018-06-01 PROCEDURE — 87205 SMEAR GRAM STAIN: CPT

## 2018-06-01 PROCEDURE — 94003 VENT MGMT INPAT SUBQ DAY: CPT

## 2018-06-01 PROCEDURE — 36600 WITHDRAWAL OF ARTERIAL BLOOD: CPT

## 2018-06-01 PROCEDURE — 85610 PROTHROMBIN TIME: CPT

## 2018-06-01 PROCEDURE — 36415 COLL VENOUS BLD VENIPUNCTURE: CPT

## 2018-06-01 PROCEDURE — 74018 RADEX ABDOMEN 1 VIEW: CPT

## 2018-06-01 PROCEDURE — 85730 THROMBOPLASTIN TIME PARTIAL: CPT

## 2018-06-01 PROCEDURE — 76604 US EXAM CHEST: CPT

## 2018-06-01 PROCEDURE — 83735 ASSAY OF MAGNESIUM: CPT

## 2018-06-01 PROCEDURE — 84100 ASSAY OF PHOSPHORUS: CPT

## 2018-06-01 PROCEDURE — 82948 REAGENT STRIP/BLOOD GLUCOSE: CPT

## 2018-06-01 PROCEDURE — 86920 COMPATIBILITY TEST SPIN: CPT

## 2018-06-01 PROCEDURE — 86900 BLOOD TYPING SEROLOGIC ABO: CPT

## 2018-06-01 PROCEDURE — 80053 COMPREHEN METABOLIC PANEL: CPT

## 2018-06-01 PROCEDURE — 84132 ASSAY OF SERUM POTASSIUM: CPT

## 2018-06-01 PROCEDURE — 99284 EMERGENCY DEPT VISIT MOD MDM: CPT

## 2018-06-01 PROCEDURE — 83690 ASSAY OF LIPASE: CPT

## 2018-06-01 PROCEDURE — 81001 URINALYSIS AUTO W/SCOPE: CPT

## 2018-06-01 PROCEDURE — 36556 INSERT NON-TUNNEL CV CATH: CPT

## 2018-06-01 PROCEDURE — 88307 TISSUE EXAM BY PATHOLOGIST: CPT

## 2018-06-01 PROCEDURE — 87040 BLOOD CULTURE FOR BACTERIA: CPT

## 2018-06-01 PROCEDURE — 85018 HEMOGLOBIN: CPT

## 2018-06-01 PROCEDURE — 80202 ASSAY OF VANCOMYCIN: CPT

## 2018-06-01 PROCEDURE — 74177 CT ABD & PELVIS W/CONTRAST: CPT

## 2018-06-01 PROCEDURE — 74250 X-RAY XM SM INT 1CNTRST STD: CPT

## 2018-06-01 PROCEDURE — 96366 THER/PROPH/DIAG IV INF ADDON: CPT

## 2018-06-01 PROCEDURE — 87070 CULTURE OTHR SPECIMN AEROBIC: CPT

## 2018-06-01 PROCEDURE — 82553 CREATINE MB FRACTION: CPT

## 2018-06-01 PROCEDURE — 86850 RBC ANTIBODY SCREEN: CPT

## 2018-06-01 PROCEDURE — 80076 HEPATIC FUNCTION PANEL: CPT

## 2018-06-01 PROCEDURE — 96411 CHEMO IV PUSH ADDL DRUG: CPT

## 2018-06-01 PROCEDURE — 88309 TISSUE EXAM BY PATHOLOGIST: CPT

## 2018-06-01 PROCEDURE — 31500 INSERT EMERGENCY AIRWAY: CPT

## 2018-06-01 PROCEDURE — 84484 ASSAY OF TROPONIN QUANT: CPT

## 2018-06-01 PROCEDURE — 85014 HEMATOCRIT: CPT

## 2018-06-01 PROCEDURE — 96360 HYDRATION IV INFUSION INIT: CPT

## 2018-06-01 PROCEDURE — 93005 ELECTROCARDIOGRAM TRACING: CPT

## 2018-06-01 PROCEDURE — 36430 TRANSFUSION BLD/BLD COMPNT: CPT

## 2018-06-01 PROCEDURE — 71045 X-RAY EXAM CHEST 1 VIEW: CPT

## 2018-06-01 PROCEDURE — 74022 RADEX COMPL AQT ABD SERIES: CPT

## 2018-06-01 RX ADMIN — HYDROMORPHONE HYDROCHLORIDE PRN MG: 2 INJECTION INTRAMUSCULAR; INTRAVENOUS; SUBCUTANEOUS at 12:24

## 2018-06-01 RX ADMIN — TAZOBACTAM SODIUM AND PIPERACILLIN SODIUM SCH MLS/HR: 375; 3 INJECTION, SOLUTION INTRAVENOUS at 21:29

## 2018-06-01 RX ADMIN — HYDROMORPHONE HYDROCHLORIDE PRN MG: 2 INJECTION INTRAMUSCULAR; INTRAVENOUS; SUBCUTANEOUS at 14:30

## 2018-06-01 RX ADMIN — LORAZEPAM PRN MG: 2 INJECTION INTRAMUSCULAR; INTRAVENOUS at 20:50

## 2018-06-01 RX ADMIN — POTASSIUM CHLORIDE AND SODIUM CHLORIDE SCH MLS/HR: 900; 150 INJECTION, SOLUTION INTRAVENOUS at 14:00

## 2018-06-01 RX ADMIN — HYDROMORPHONE HYDROCHLORIDE PRN MG: 2 INJECTION INTRAMUSCULAR; INTRAVENOUS; SUBCUTANEOUS at 22:00

## 2018-06-01 RX ADMIN — TAZOBACTAM SODIUM AND PIPERACILLIN SODIUM SCH MLS/HR: 375; 3 INJECTION, SOLUTION INTRAVENOUS at 15:45

## 2018-06-01 NOTE — DIAGNOSTIC IMAGING REPORT
PROCEDURE: CT ABDOMEN AND PELVIS WITH CONTRAST

 

TECHNIQUE: 

The abdomen and pelvis were scanned utilizing a multidetector helical 

scanner from the diaphragm to the lesser trochanter after the IV 

administration of 100 cc of Isovue 370 and the oral administration of 

Gastrografin. Coronal and sagittal multiplanar reformations were 

obtained.

 

COMPARISON: 1/22/2018

 

INDICATIONS:   ABD PAIN/DISTENSION

 

FINDINGS:

LOWER THORAX: 1.1 cm lingular nodule, not included on the scan field of 

the comparison examination. Bibasilar subsegmental atelectasis..

 

HEPATOBILIARY: Stable subcentimeter hypoattenuating lesion in segment 

3, too small to further characterize but likely represent a small cyst. 

No additional focal hepatic lesion. No intrahepatic biliary ductal 

dilatation.

SPLEEN: No splenomegaly or focal splenic lesion.

PANCREAS: No focal pancreatic mass or ductal dilatation.

 

ADRENALS: No adrenal nodules.

KIDNEYS/URETERS: Mild fullness of the left renal collecting system 

without overt hydronephrosis. No gross renal mass lesion. No calculi.

PELVIC ORGANS/BLADDER: The urinary bladder is unremarkable. Prostate 

and seminal vesicles appear normal.

 

PERITONEUM / RETROPERITONEUM: Small volume intraperitoneal and 

peripancreatic free fluid, average attenuation 15-20 Hounsfield units.

LYMPH NODES: The marked progression of retroperitoneal lymphadenopathy, 

with conglomerate lymph node masses measuring 5.8 x 3.8 cm in the 

aortocaval region and 3.6 x 2.6 cm in the periaortic region seen on 

series 2 image 40. Mesorectal mildly enlarged lymph nodes and fat 

stranding are unchanged. No mesenteric lymphadenopathy.

VESSELS: The abdominal aorta, major branch vessels, and iliac arterial 

systems are well-visualized and patent. Minimal atherosclerotic 

calcification of the abdominal aorta. The inferior vena cava is 

displaced anteriorly from the vertebral column by above-described 

lymphadenopathy. Portal vein, splenic vein, and central superior 

mesenteric vein are patent.

 

GI TRACT: Interval partial colectomy and right lower quadrant 

ileostomy. There is parastomal herniation of small bowel seen on series 

2 image 70 resulting in high-grade small bowel obstruction. No 

pneumoperitoneum.

 

BONES AND SOFT TISSUES: No osseous destructive lesions. Bilateral pars 

interarticularis defects at L5 with stable anterolisthesis of L5 over 

S1. No focal soft tissue abnormalities.

 

IMPRESSION:

 

Interval partial colectomy and right lower quadrant diverting 

ileostomy. 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 

obstruction with associated reactive ascites. No torrie perforation.

 

Persistent perirectal fat stranding and lymphadenopathy concerning for 

metastatic disease.

 

1.3 cm lingular pulmonary nodule is presumably metastatic.

 

Bulky retroperitoneal lymphadenopathy is greater than would be expected 

for metastatic colorectal cancer. Alternative etiologies such as 

lymphoma should be considered. Mild fullness of the left renal 

collecting system is likely due to mass effect from adenopathy. 

 

Dictated by:  Jameson Farooq M.D. on 6/01/2018 at 9:27     

Electronically approved by:  Jameson Farooq M.D. on 6/01/2018 at 9:27

## 2018-06-01 NOTE — DIAGNOSTIC IMAGING REPORT
PROCEDURE:X-RAY ABDOMEN - KUB

 

COMPARISON:CT abdomen/pelvis 0845 hours.

 

INDICATIONS:POST NG TUBE PLACEMENT

 

FINDINGS:

 

Lines/tubes: Enteric tube terminates in the proximal stomach.

 

Bowel: Numerous dilated small bowel loops are re-demonstrated, 

measuring up to 5 cm in diameter. No pneumatosis. There is little large 

bowel air.

 

Calcifications: None. There is excreted contrast in the collecting 

systems from CT. The collecting system is mildly distended consistent 

with hydronephrosis. This is secondary to retroperitoneal 

lymphadenopathy identified on CT. The right renal collecting system is 

normal.

 

Organomegaly: None.

 

Free air: None.

 

Lung bases: Clear

 

CONCLUSION:

1.  Enteric tube as described above.

2.  Dilated small bowel loops consistent with obstruction. 

3.  Mild left hydronephrosis secondary to retroperitoneal 

lymphadenopathy.

 

 

Dictated by:  Tyrone Taylor M.D. on 6/01/2018 at 11:22     

Electronically approved by:  Tyrone Taylor M.D. on 6/01/2018 at 

11:22

## 2018-06-01 NOTE — XMS REPORT
Continuity of Care Document

 Created on: 2018



DALE RUIZ

External Reference #: [id number]

: 1970

Sex: Male



Demographics







 Address  [address] 

[place]  [address]

 

 Home Phone  [phone]

 

 Preferred Language  Unknown

 

 Marital Status  Unknown

 

 Hinduism Affiliation  Unknown

 

 Race  White

 

 Ethnic Group  Unknown





Author







 Author  [facility]

 

 Organization  [facility]

 

 Address  [address]

[place]  [address]



 

 Phone  Unavailable







Support







 Name  Relationship  Address  Phone

 

 MIGUEL ÁNGEL ROBLEDO MD  Caregiver  [address]

[place]  [address]  Unavailable

 

 EDIL GALEAS MD  Caregiver  [address]

[address]

[place], [place]  [address] [phone]

 

 EDIL GALEAS MD  Caregiver  [address]

[address]

[place], [place]  [id number] [phone]

 

 NO, FAMILY  Caregiver  Unknown  Unavailable

 

 NO, PCP  Caregiver  Unknown  Unavailable

 

 STEPHEN RUIZ  Next Of Kin  [address], [place]  [id number] [phone]







Care Team Providers







 Care Team Member Name  Role  Phone

 

 NO, PCP  PCP  Unavailable







Insurance Providers







 Guarantor  Dale Ruiz

 

 Address  [address] 

[place] [address]

 

 Phone  [phone]

 

 Email  [email or web address]











 Payer  [employer] Other

 

 Policy Number  [id number]

 

 Subscriber's Name  Guy Ruizoneil ALANIS

 

 Relationship  18 Self / Same As Patient







Advance Directives







 Directive  Response  Recorded Date/Time

 

 Does the patient have an advance directive?  No  18 8:39pm

 

 If yes, is advance directive on file with [facility]?  No  14 10:23am

 

 If not on file with [facility] will patient provide a copy?  No  18 8:39pm

 

 Do you have a Directive to Physician?  No  18 10:20am

 

 Do you have a Medical Power of ?  No  18 10:20am

 

 Do you have an out of hospital Do Not Resuscitate Order?  No  18 10:20am

 

 Do you have any special needs we should be aware of?  No  18 10:20am

 

 Do you have a support person here with you today?  Yes  18 10:20am

 

 Did patient receive Notice of Privacy Practices?  Yes  18 10:20am

 

 Did patient receive patient rights and responsibilities?  Yes  18 10:20am







Problems







 Medical Problem  Onset Date  Status

 

 Abdominal pain in male  Unknown   

 

 Toxic megacolon  Unknown   







Medications





Current Home Medications





 Medication  Dose  Units  Route  Directions  Days  Qty  Instructions  Start Date

 

 Hydrocodone Bit/Acetaminophen (Norco 7.5-325 Tablet) 1 Each Tablet  1  Ea  
Oral  Every 4 Hours as needed for Pain     30 Tab      

 

 Levofloxacin (Levaquin) 500 Mg Tablet  500  Mg  Oral  Daily  7 Days         







Social History







 Social History Problem  Response  Recorded Date/Time  Onset Date  Status

 

 Hx Psychiatric Problems  No  2018 8:39pm  Not Applicable  Not Applicable

 

 Hx Eating Disorder  No  2018 8:39pm  Not Applicable  Not Applicable

 

 Hx Substance Use Disorder  No  2018 8:39pm  Not Applicable  Not 
Applicable

 

 Hx Depression  No  2018 8:39pm  Not Applicable  Not Applicable

 

 Hx Alcohol Use  Y - socially  2018 8:39pm  Not Applicable  Not Applicable

 

 Hx Substance Use Treatment  No  2018 8:39pm  Not Applicable  Not 
Applicable

 

 Hx Physical Abuse  No  2018 8:39pm  Not Applicable  Not Applicable











 Smoking Status  Start Date  Stop Date

 

 Never Smoker      







Hospital Discharge Instructions

No hospital discharge instruction information available.



Plan of Care







 Discharge Date  18 8:55am

 

 Disposition  HOME, SELF-CARE

 

 Instructions/Education Provided  Post Operative Pain

[employer] Care

 

 Forms Provided  Work/School Excuse

 

 Prescriptions  See Medication Section

 

 Referrals  JAMES CABEZAS MD (Surgery)

Order Date: 2018 

Entered Date: 2018 7:08pm

Address:

 [address] [place] [id number] [phone]

 

 Additional Instructions/Education  DIET AS TOLERATED , AS DISCUSSED WITH 
DIETICIAN

ACTIVITY AS TOLERATED, NO HEAVING LIFTING, NO DRIVING

CHANGE DRESSING AS DISCUSSED, AS MANY TIMES AS NEEDED

FOLLOW UP WITH MD EDDY CABEZAS ON 

HOME HEALTH TO BE SET UP PER CASE MANAGEMENT







Functional Status







 Query  Response  Date Recorded

 

 Assistive Devices  None

  2018 9:19pm

 

 Ambulation Ability  Independent

  2018 9:19pm

 

 Toileting Ability  Minimum Assistance

  2018 6:44pm







Allergies, Adverse Reactions, Alerts

No known allergies.



Immunizations

No immunization information available.



Vital Signs





Acute Vital Signs





 Vital  Response  Date/Time

 

 Temperature (Fahrenheit)  96.8 degrees F (97.6 - 99.5)  2018 8:02am

 

 Pulse      

 

    Pulse Rate (adult)  89 bpm (60 - 90)  2018 8:02am

 

 Respiratory Rate  18 bpm (12 - 24)  2018 8:02am

 

 Blood Pressure  150/87 mm Hg  2018 8:02am

 

 Height  6 ft 2 in  2018 8:39pm

 

 Weight  193.50 lb  2018 8:04am

 

 Body Mass Index  24.8 kg/m^2  2018 8:04am







Results





Laboratory Results





 Test Name  Result  Units  Flags  Reference  Collection Date/Time  Result Date/
Time  Comments

 

 White Blood Count  11.67  x10e3/uL   H  4.8-10.8  2018 7:58am  2018 8:35am   

 

 Red Blood Count  3.84  x10e6/uL   L  4.3-5.7  2018 7:58am  2018 8:
35am   

 

 Hemoglobin  9.0  g/dL   L  14.0-18.0  2018 7:58am  2018 8:35am   

 

 Hematocrit  30.8  %   L  38.2-49.6  2018 7:58am  2018 8:35am   

 

 Mean Corpuscular Volume  80.2  fL   L  81-99  2018 7:58am  2018 8:
35am   

 

 Mean Corpuscular Hemoglobin  23.4  pg   L  28-32  2018 7:58am  2018 8:35am   

 

 Mean Corpuscular Hemoglobin Concent  29.2  g/dL   L  31-35  2018 7:58am  
2018 8:35am   

 

 Red Cell Distribution Width  30.1  %   H  11.7-14.4  2018 7:58am  2018 8:35am   

 

 Platelet Count  187  x10e3/uL     140-360  2018 7:58am  2018 8:
35am   

 

 Neutrophils (%) (Auto)  83.2  %   H  38.7-80.0  2018 7:58am  2018 8
:35am   

 

 Lymphocytes (%) (Auto)  4.9  %   L  18.0-39.1  2018 7:58am  2018 8:
35am   

 

 Monocytes (%) (Auto)  8.5   %     4.4-11.3  2018 7:58am  2018 8:
35am   

 

 Eosinophils (%) (Auto)  1.3  %     0.0-6.0  2018 7:58am  2018 8:
35am   

 

 Basophils (%) (Auto)  0.1  %     0.0-1.0  2018 7:58am  2018 8:35am
   

 

 IM GRANULOCYTES %  2.0  %   H  0.0-1.0  2018 7:58am  2018 8:35am   

 

 Neutrophils # (Auto)  9.7      H  2.1-6.9  2018 7:58am  2018 8:
35am   

 

 Lymphocytes # (Auto)  0.6      L  1.0-3.2  2018 7:58am  2018 8:
35am   

 

 Monocytes # (Auto)  1.0      H  0.2-0.8  2018 7:58am  2018 8:35am 
  

 

 Eosinophils # (Auto)  0.2        0.0-0.4  2018 7:58am  2018 8:35am
   

 

 Basophils # (Auto)  0.0        0.0-0.1  2018 7:58am  2018 8:35am   

 

 Absolute Immature Granulocyte (auto  0.23  x10e3/uL   H  0-0.1  2018 7:
58am  2018 8:35am   

 

 Differential Total Cells Counted  100           2018 7:58am  2018 
10:13am   

 

 Neutrophils % (Manual)  85  %   H  40-74  2018 7:58am  2018 10:
13am   

 

 Band Neutrophils %  3  %        2018 5:30am  2018 6:37am   

 

 Lymphocytes % (Manual)  5  %   L  19-48  2018 7:58am  2018 10:13am
   

 

 Monocytes % (Manual)  10  %   H  3.4-9.0  2018 7:58am  2018 10:
13am   

 

 Eosinophils % (Manual)  1  %     0-7  2018 6:05am  2018 7:55am   

 

 Reactive Lymphocytes  2           2018 6:05am  2018 7:55am   

 

 Platelet Estimate  ADEQUATE           2018 7:58am  2018 10:13am   

 

 Platelet Morphology Comment  NORMAL           2018 7:58am  2018 10:
13am   

 

 Polychromasia  FEW           2018 5:30am  2018 6:37am   

 

 Hypochromasia  MODERATE           2018 5:30am  2018 6:37am   

 

 Poikilocytosis  MODERATE           2018 5:30am  2018 6:37am   

 

 Anisocytosis  MODERATE           2018 5:30am  2018 6:37am   

 

 Microcytosis  MODERATE           2018 5:30am  2018 6:37am   

 

 Target Cells  FEW           2018 3:30pm  2018 4:35pm   

 

 Murphy-Selman Bodies  FEW           2018 6:50am  2018 9:59am   

 

 Elliptocytes  MODERATE           2018 7:01am  2018 8:56am   

 

 Schistocytes  RARE           2018 5:30am  2018 6:37am   

 

 Red Cell Morphology Comment  NORMAL           2018 7:58am  2018 10:
13am   

 

 Erythrocyte Sedimentation Rate  15  mm/hr   H  0-13  2018 6:05am  2018 7:05am   

 

 Prothrombin Time  15.7  seconds   H  11.9-14.5  2018 9:30am  2018 9
:56am   

 

 Prothromb Time International Ratio  1.19           2018 9:30am  2018 9:56am  Oral Anticoagulant Therapy INR Values:



 1. Low Intensity Therapy        1.5 - 2.0



 2. Moderate Intensity Therapy   2.0 - 3.0



 3. High Intensity Therapy(1)    2.5 - 3.5



 4. High Intensity Therapy(2)    3.0 - 4.0



 5. Panic Value INR              > 5.0

 

 Urine Color  STRAW        YELLOW  2018 10:2018 6:02pm   

 

 Urine Clarity  CLEAR        CLEAR  2018 10:2018 6:02pm   

 

 Urine Specific Gravity  1.015        1.010-1.025  2018 10:2018 6:02pm   

 

 Urine pH  5        5 - 7  2018 10:2018 6:02pm   

 

 Urine Leukocyte Esterase  NEGATIVE        NEGATIVE  2018 10:2018 6:02pm   

 

 Urine Nitrite  NEGATIVE        NEGATIVE  2018 10:2018 6:02pm
   

 

 Urine Protein  NEGATIVE        NEGATIVE  2018 10:2018 6:02pm
   

 

 Urine Glucose (UA)  NEGATIVE        NEGATIVE  2018 10:2018 6:
02pm   

 

 Urine Ketones  TRACE      H  NEGATIVE  2018 10:2018 6:02pm   

 

 Urine Urobilinogen  0.2  mg/dL     0.2 - 1  2018 10:2018 6:
02pm   

 

 Urine Bilirubin  NEGATIVE        NEGATIVE  2018 10:2018 6:
02pm   

 

 Urine Blood  NEGATIVE        NEGATIVE  2018 10:2018 6:02pm   

 

 Urine WBC  NONE  /HPF     0-5  2018 10:2018 6:16pm   

 

 Urine RBC  NONE  /HPF     0-5  2018 10:2018 6:16pm   

 

 Urine Bacteria  NONE  /HPF     NONE  2018 10:2018 6:16pm   

 

 Urine Epithelial Cells  NONE  /LPF     NONE  2018 10:272018 6:
16pm   

 

 Sodium Level  134  mmol/L   L  136-145  2018 7:58am  2018 8:48am   

 

 Potassium Level  3.5  mmol/L     3.5-5.1  2018 7:58am  2018 8:48am
   

 

 Chloride Level  104  mmol/L       2018 7:58am  2018 8:48am   

 

 Carbon Dioxide Level  26  mmol/L      7:58am  2018 8:
48am   

 

 Anion Gap  7.5  mmol/L   L  8-16  2018 7:58am  2018 8:48am   

 

 Blood Urea Nitrogen  7  mg/dL     7-26  2018 7:58am  2018 8:48am   

 

 Creatinine  0.64  mg/dL   L  0.72-1.25  2018 7:58am  2018 8:48am   

 

 BUN/Creatinine Ratio  11        6-25  2018 7:58am  2018 8:48am   

 

 Estimat Glomerular Filtration Rate  > 60  ML/MIN     60-  2018 7:58am   8:48am  Ranges were taken from the National Kidney Disease Education 

Program and the National Kidney Foundation literature.







Reference ranges:



 60 or greater: Normal



 16-59 (for 3 consecutive months): Chronic kidney disease 



 15 or less: Kidney failure

 

 Glucose Level  88  mg/dL       2018 7:58am  2018 8:48am   

 

 Calcium Level  7.5  mg/dL   L  8.4-10.2  2018 7:58am  2018 8:48am 
  

 

 Iron Level  8  ug/dL   L    2018 6:05am  2018 7:18am   

 

 Total Iron Binding Capacity  179  ug/dL   L  261-478  2018 6:05am  2018 7:18am   

 

 Percent Iron Saturation  4  %   L  15-50  2018 6:05am  2018 7:18am
   

 

 Transferrin  128  mg/dL   L  174-364  2018 6:05am  2018 7:18am   

 

 Ferritin  7.98  ng/mL   L  21..66  2018 6:05am  2018 7:39am 
  

 

 Total Bilirubin  0.9  mg/dL     0.2-1.2  2018 7:58am  2018 8:48am 
  

 

 Aspartate Amino Transf (AST/SGOT)  11  IU/L     5-34  2018 7:58am  2018 8:48am   

 

 Alanine Aminotransferase (ALT/SGPT)  6  IU/L     0-55  2018 7:58am   8:48am   

 

 Total Protein  4.4  g/dL   L  6.5-8.1  2018 7:58am  2018 8:48am   

 

 Albumin  1.2  g/dL   L  3.5-5.0  2018 7:58am  2018 8:48am   

 

 Globulin  3.2  g/dL     2.3-3.5  2018 7:58am  2018 8:48am   

 

 Albumin/Globulin Ratio  0.4      L  0.8-2.0  2018 7:58am  2018 8:
48am   

 

 Alkaline Phosphatase  48  IU/L       2018 7:58am  2018 8:
48am   

 

 B-Type Natriuretic Peptide  73.8  pg/mL     0-100  2018 6:05am  2018 7:01am   

 

 Lipase  5  U/L   L  8-78  2018 10:36am  2018 11:12am   

 

 Carcinoembryonic Antigen  2.1  ng/mL     0.0-4.7  2018 6:05am  2018 6:56pm         Roche ECLIA methodology       Nonsmokers  <3.9



                                     Smokers     <5.6



Performed at:  [address]  [id number]



: John Valles MD, Phone:  [phone]



 

 C-Reactive Protein  158.1  mg/L   H  0.0-4.9  2018 6:05am  2018 12:
23pm  Performed at:  [address]  [id number]



: John Valles MD, Phone:  [phone]



 

 Clostridium Difficile Toxin A & B  NEGATIVE        NEGATIVE  2018 10:
00am  2018 12:58pm  Testing on stool aspirate specimens is outside 
 

claims since specimen type not validated on this assay.







Procedures







 Procedure  Status  Date  Provider(s)

 

 Flexible sigmoidoscopy  Completed  18  PARISA BLAS MD

 

 Colon resection  Completed  18  JAMES CABEZAS MD

 

 Insertion of venous access port  Completed  18  JAMES CABEZAS MD

 

 Computed tomography of abdomen and pelvis with contrast  Active  18  MIGUEL ÁNGEL ROBLEDO MD







Encounters







 Encounter  Location  Arrival/Admit Date  Discharge/Depart Date  Attending 
Provider

 

 Discharged Inpatient  [facility]  18 11:11am   8:55am  EDIL GALEAS MD

## 2018-06-01 NOTE — DIAGNOSTIC IMAGING REPORT
PROCEDURE: CHEST SINGLE (PORTABLE)

COMPARISON: CT abdomen and pelvis 1/22/2018.

INDICATIONS: ABDOMEN PAIN, LOW POTASSIUM

 

FINDINGS:



Left subclavian central venous port catheter is noted. The tip projects 

over the expected region of the brachiocephalic venous confluence.

 

The lungs are well-inflated. Linear opacity in the lung bases left 

greater than right compatible with subsegmental atelectasis. No 

consolidation or pleural effusion.

 

Cardiomediastinal contour and pulmonary vasculature are within normal 

limits. No acute osseous abnormality.

 

CONCLUSION:

Subsegmental atelectasis in the lung bases. Otherwise no acute 

cardiopulmonary abnormality. 

 

Dictated by:  Jameson Farooq M.D. on 6/01/2018 at 8:17     

Electronically approved by:  Jameson Farooq M.D. on 6/01/2018 at 8:17

## 2018-06-01 NOTE — XMS REPORT
Patient Summary Document

 Created on: 2018



CHRISTINE ADEN

External Reference #: [id number]

: 1970

Sex: Male



Demographics







 Address  [address] [place] [address] [address] [place] [address]

 

 Home Phone  [phone]

 

 Preferred Language  Unknown

 

 Marital Status  Unknown

 

 Anabaptist Affiliation  Unknown

 

 Race  Unknown

 

 Ethnic Group  Unknown





Author







 Author  [facility]

 

 Address  Unknown

 

 Phone  Unavailable







Care Team Providers







 Care Team Member Name  Role  Phone

 

 EDIL GALEAS  Unavailable  Unavailable







Problems

This patient has no known problems.



Allergies, Adverse Reactions, Alerts

This patient has no known allergies or adverse reactions.



Medications

This patient has no known medications.



Results







 Test Description  Test Time  Test Comments  Text Results  Atomic Results  
Result Comments









 ABDOMEN-1VIEW (KUB)            [place] [address]      Patient Name: CHRISTINE ADEN   MR #: [id number]    : 1970 Age/Sex: 47/M  Acct #: [id number] Req 
#: [id number]  Adm Physician: EDIL GALEAS MD    Ordered by: MIGUEL ÁNGEL ROBLEDO MD  Report #: [id number]   Location: [facility]  Room/Bed: [place] [address]    ______________
________________________________________________________________________________
_____    Procedure: [id number] DX/ABDOMEN-1VIEW (KUB)  Exam Date: 18      
                      Exam Time: 0635       REPORT STATUS: Signed    PROCEDURE:
   X-RAY ABDOMEN - KUB       COMPARISON:   CT abdomen and pelvis 2018.    
   INDICATIONS:   ABDOMINAL PAIN       FINDINGS:      Dilated fluid filled 
loops of colon. No air is present in the rectum.    There are no calcifications 
projected over the renal shadows, expected    course of the ureters or bladder. 
There are no acute osseous    abnormalities. The lung bases are clear.       
CONCLUSION:      Dilated fluid filled loops of colon may represent a distal 
colon    obstruction.       Dictated by:  Patricia Yu M.D. on 2018 at 7:
53        Electronically approved by:  Patricia Yu M.D. on 2018 at 7:53  
              Dictated By: PATRICIA YU MD  Electronically Signed By: PATRICIA YU MD on 18  Transcribed By: KEVIN on 18       COPY TO
:   MIGUEL ÁNGEL ROBLEDO MD           

 

 CT ABDOMEN/PELVIS W            [place] [address]      Patient Name: CHRISTINE ADEN   MR #: [id number]    : 1970 Age/Sex: 47/M  Acct #: [id number] Req 
#: [id number]  Adm Physician:     Ordered by: MIGUEL ÁNGEL ROBLEDO MD  Report #: [id number]
[id number]   Location: ER  Room/Bed:     _____________________________________________
______________________________________________________    Procedure: [id number] 
CT/CT ABDOMEN/PELVIS W  Exam Date: 18                            Exam Time
: 1203       REPORT STATUS: Signed    EXAM: CT Abdomen and Pelvis WITH contrast
     INDICATION:                     COMPARISON: None available.   TECHNIQUE: 
Abdomen and pelvis were scanned utilizing a multidetector helical   scanner 
from the lung base to the pubic symphysis after administration of IV   
contrast. Coronal and sagittal reformations were obtained. Routine protocol was
   performed. Scan was performed when during portal venous phase.            IV 
CONTRAST: 100 mL of Isovue-370        ORAL CONTRAST: Gastroview                
  COMPLICATIONS: None      RADIATION DOSE:        Total DLP: 554.32 mGy*cm     
   Estimated effective dose: (DLP x 0.015 x size factor) mSv        CTDIvol has 
been reviewed. It is below the limits set by the Radiation   Protocol Committee 
(RPC).      FINDINGS:      LINES and TUBES: None.      LOWER THORAX:  Bibasilar 
atelectasis. Trace pericardial fluid.      HEPATOBILIARY: 0.9 cm left hepatic 
lobe cyst.  No focal hepatic lesions. No   biliary ductal dilation.       
GALLBLADDER: No radio-opaque stones or sludge.  No wall thickening.      SPLEEN
: Splenomegaly.       PANCREAS: No focal masses or ductal dilatation.        
ADRENALS: No adrenal nodules          KIDNEYS/URETERS: Kidneys enhance 
symmetrically.  No hydronephrosis. No cystic   or solid mass lesions.  No 
stones.      GI TRACT: Significant distention of the entire colon with loss of 
haustral   markings, measuring up to 8.3 cm with abrupt narrowing at the high 
rectal area   (series 2, image 80). The remainder of the rectum also shows wall 
thickening.   Small hiatal hernia with contrast in distal esophagus, suggestive 
of   gastroesophageal reflux.         PELVIC ORGANS/BLADDER: Unremarkable.      
LYMPH NODES: Prominent retroperitoneal lymph nodes. For example 1.1 cm and 1.2 
  cm left para-aortic lymph nodes (series 2, images 43 and 45). There are also 
  enlarged peripancreatic lymph nodes, measuring up to 1.1 cm (series 2, image 
  35).  Multiple mesorectal/presacral lymph nodes, the largest measuring 1.3 cm
   (series 2, image 75 and series 301 image 73).      VESSELS: Unremarkable.   
   PERITONEUM / RETROPERITONEUM: No free air. Trace abdominal ascites.      
BONES: Bilateral L5 pars defects with grade 1 L5-S1 spondylolisthesis.      
SOFT TISSUES: Unremarkable.                  IMPRESSION:    1.  Significant 
distention of entire colon with narrowing at the high rectal   area. 
Differentials include toxic megacolon (considering history of   inflammatory 
bowel disease), colonic ileus, and partial obstruction from rectal   mass. 
There is rectal wall thickening and suspected mesorectal and   retroperitoneal 
lymph nodes.   2.  Trace abdominal ascites and pericardial effusion.      
Findings discussed with Dr. Robledo at 12:50 PM, on 2018.      Signed by: Dr. Brock Jones MD on 2018 12:53 PM        Dictated By: BROCK JONES MD  
Electronically Signed By: BROCK JONES MD on 18 1253  Transcribed By: 
LAW on 18 1253       COPY TO:   MIGUEL ÁNGEL ROBLEDO MD

## 2018-06-01 NOTE — DIAGNOSTIC IMAGING REPORT
EXAMINATION:  ABDOMEN-1VIEW (KUB)    6/1/2018 6:51 PM



COMPARISON:  6/1/2018 at 10:54 AM



INDICATION: NG tube placement

     

DISCUSSION:

1 view of the abdomen (AP supine)



Enteric tube has its tip in the gastric body.

A catheter is projected over the left lung.

Multiple dilated small bowel loops are identified.

The right hemiabdomen and pelvis are excluded from this examination.



IMPRESSION: 

Enteric tube tip in the gastric body.



Scott Colon MD



Signed by: Dr. Scott Colon M.D. on 6/1/2018 7:33 PM

## 2018-06-02 VITALS — SYSTOLIC BLOOD PRESSURE: 146 MMHG | DIASTOLIC BLOOD PRESSURE: 91 MMHG

## 2018-06-02 VITALS — SYSTOLIC BLOOD PRESSURE: 150 MMHG | DIASTOLIC BLOOD PRESSURE: 80 MMHG

## 2018-06-02 VITALS — DIASTOLIC BLOOD PRESSURE: 83 MMHG | SYSTOLIC BLOOD PRESSURE: 139 MMHG

## 2018-06-02 VITALS — DIASTOLIC BLOOD PRESSURE: 77 MMHG | SYSTOLIC BLOOD PRESSURE: 117 MMHG

## 2018-06-02 VITALS — SYSTOLIC BLOOD PRESSURE: 157 MMHG | DIASTOLIC BLOOD PRESSURE: 95 MMHG

## 2018-06-02 VITALS — SYSTOLIC BLOOD PRESSURE: 142 MMHG | DIASTOLIC BLOOD PRESSURE: 85 MMHG

## 2018-06-02 LAB
ALBUMIN SERPL-MCNC: 3.3 G/DL (ref 3.5–5)
ALBUMIN/GLOB SERPL: 0.8 {RATIO} (ref 0.8–2)
ALP SERPL-CCNC: 142 IU/L (ref 40–150)
ALT SERPL-CCNC: 28 IU/L (ref 0–55)
ANION GAP SERPL CALC-SCNC: 17.8 MMOL/L (ref 8–16)
BASOPHILS # BLD AUTO: 0 10*3/UL (ref 0–0.1)
BASOPHILS NFR BLD AUTO: 1 % (ref 0–1)
BUN SERPL-MCNC: 11 MG/DL (ref 7–26)
BUN/CREAT SERPL: 11 (ref 6–25)
CALCIUM SERPL-MCNC: 9.4 MG/DL (ref 8.4–10.2)
CHLORIDE SERPL-SCNC: 100 MMOL/L (ref 98–107)
CO2 SERPL-SCNC: 26 MMOL/L (ref 22–29)
DEPRECATED NEUTROPHILS # BLD AUTO: 0.8 10*3/UL (ref 2.1–6.9)
EGFRCR SERPLBLD CKD-EPI 2021: > 60 ML/MIN (ref 60–?)
EOSINOPHIL # BLD AUTO: 0.1 10*3/UL (ref 0–0.4)
EOSINOPHIL NFR BLD AUTO: 7.1 % (ref 0–6)
EOSINOPHIL NFR BLD MANUAL: 2 % (ref 0–7)
ERYTHROCYTE [DISTWIDTH] IN CORD BLOOD: 24.9 % (ref 11.7–14.4)
GLOBULIN PLAS-MCNC: 3.9 G/DL (ref 2.3–3.5)
GLUCOSE SERPLBLD-MCNC: 104 MG/DL (ref 74–118)
HCT VFR BLD AUTO: 38.5 % (ref 38.2–49.6)
HGB BLD-MCNC: 12.8 G/DL (ref 14–18)
LYMPHOCYTES # BLD: 0.2 10*3/UL (ref 1–3.2)
LYMPHOCYTES NFR BLD AUTO: 11.2 % (ref 18–39.1)
LYMPHOCYTES NFR BLD MANUAL: 20 % (ref 19–48)
MCH RBC QN AUTO: 29.7 PG (ref 28–32)
MCHC RBC AUTO-ENTMCNC: 33.2 G/DL (ref 31–35)
MCV RBC AUTO: 89.3 FL (ref 81–99)
METAMYELOCYTES NFR BLD MANUAL: 3 % (ref 0–0)
MONOCYTES # BLD AUTO: 0.7 10*3/UL (ref 0.2–0.8)
MONOCYTES NFR BLD AUTO: 36.7 % (ref 4.4–11.3)
MONOCYTES NFR BLD MANUAL: 13 % (ref 3.4–9)
MYELOCYTES NFR BLD MANUAL: 3 % (ref 0–0)
NEUTS BAND NFR BLD MANUAL: 7 %
NEUTS SEG NFR BLD AUTO: 42 % (ref 38.7–80)
NEUTS SEG NFR BLD MANUAL: 52 % (ref 40–74)
PLAT MORPH BLD: NORMAL
PLATELET # BLD AUTO: 172 X10E3/UL (ref 140–360)
PLATELET # BLD EST: ADEQUATE 10*3/UL
POTASSIUM SERPL-SCNC: 2.8 MMOL/L (ref 3.5–5.1)
RBC # BLD AUTO: 4.31 X10E6/UL (ref 4.3–5.7)
RBC MORPH BLD: NORMAL
SODIUM SERPL-SCNC: 141 MMOL/L (ref 136–145)

## 2018-06-02 PROCEDURE — 3E0336Z INTRODUCTION OF NUTRITIONAL SUBSTANCE INTO PERIPHERAL VEIN, PERCUTANEOUS APPROACH: ICD-10-PCS | Performed by: INTERNAL MEDICINE

## 2018-06-02 RX ADMIN — POTASSIUM CHLORIDE AND SODIUM CHLORIDE SCH MLS/HR: 900; 150 INJECTION, SOLUTION INTRAVENOUS at 20:10

## 2018-06-02 RX ADMIN — LORAZEPAM PRN MG: 2 INJECTION INTRAMUSCULAR; INTRAVENOUS at 15:39

## 2018-06-02 RX ADMIN — HYDROMORPHONE HYDROCHLORIDE PRN MG: 2 INJECTION INTRAMUSCULAR; INTRAVENOUS; SUBCUTANEOUS at 05:29

## 2018-06-02 RX ADMIN — TAZOBACTAM SODIUM AND PIPERACILLIN SODIUM SCH MLS/HR: 375; 3 INJECTION, SOLUTION INTRAVENOUS at 05:57

## 2018-06-02 RX ADMIN — DIPHENHYDRAMINE HYDROCHLORIDE PRN MG: 50 INJECTION INTRAMUSCULAR; INTRAVENOUS at 01:49

## 2018-06-02 RX ADMIN — POTASSIUM CHLORIDE AND SODIUM CHLORIDE SCH MLS/HR: 900; 150 INJECTION, SOLUTION INTRAVENOUS at 06:06

## 2018-06-02 RX ADMIN — POTASSIUM CHLORIDE AND SODIUM CHLORIDE SCH MLS/HR: 900; 150 INJECTION, SOLUTION INTRAVENOUS at 05:57

## 2018-06-02 RX ADMIN — DIPHENHYDRAMINE HYDROCHLORIDE PRN MG: 50 INJECTION INTRAMUSCULAR; INTRAVENOUS at 11:20

## 2018-06-02 RX ADMIN — POTASSIUM CHLORIDE AND SODIUM CHLORIDE SCH MLS/HR: 900; 150 INJECTION, SOLUTION INTRAVENOUS at 13:37

## 2018-06-02 RX ADMIN — LORAZEPAM PRN MG: 2 INJECTION INTRAMUSCULAR; INTRAVENOUS at 22:12

## 2018-06-02 RX ADMIN — TAZOBACTAM SODIUM AND PIPERACILLIN SODIUM SCH MLS/HR: 375; 3 INJECTION, SOLUTION INTRAVENOUS at 13:37

## 2018-06-02 RX ADMIN — TAZOBACTAM SODIUM AND PIPERACILLIN SODIUM SCH MLS/HR: 375; 3 INJECTION, SOLUTION INTRAVENOUS at 22:11

## 2018-06-02 RX ADMIN — DIPHENHYDRAMINE HYDROCHLORIDE PRN MG: 50 INJECTION INTRAMUSCULAR; INTRAVENOUS at 20:02

## 2018-06-02 NOTE — DIAGNOSTIC IMAGING REPORT
Exam: KUB with PA chest.



Clinical History: Small bowel obstruction, hypokalemia



Comparison: Abdomen one view 6/1/2018, CT abdomen and pelvis 6/1/2018



DISCUSSION:

Enteric tube in place, with distal tip projecting in the stomach fundus

No significant interval change in multiple air-filled, dilated loops of small

bowel, with maximal measurement of approximately 5.3 cm.. Multiple staggered

air-fluid levels are noted on upright view. No pneumoperitoneum.

No abnormal calcifications.

No acute bony abnormalities.

Hyperdensity in the mid pelvis consistent with contrast-filled bladder.



Mildly hypoinflated lungs with bibasilar linear opacities consistent with

subsegmental atelectasis. Left upper chest Port-A-Cath, with distal tip

projecting in the proximal SVC.   Cardiomediastinal silhouette is normal.  

Pulmonary vasculature is normal. No acute bony abnormalities.





IMPRESSION:



1. No significant interval change in findings consistent with small bowel

obstruction. No pneumoperitoneum.



2. Hypoinflated lungs with bibasilar subsegmental atelectasis.



The staff physician below has personally reviewed this exam on the date of

dictation.



Signed by: Dr. Frandy Monaco M.D. on 6/2/2018 8:57 AM

## 2018-06-03 VITALS — SYSTOLIC BLOOD PRESSURE: 159 MMHG | DIASTOLIC BLOOD PRESSURE: 85 MMHG

## 2018-06-03 VITALS — DIASTOLIC BLOOD PRESSURE: 83 MMHG | SYSTOLIC BLOOD PRESSURE: 136 MMHG

## 2018-06-03 VITALS — DIASTOLIC BLOOD PRESSURE: 74 MMHG | SYSTOLIC BLOOD PRESSURE: 126 MMHG

## 2018-06-03 VITALS — DIASTOLIC BLOOD PRESSURE: 85 MMHG | SYSTOLIC BLOOD PRESSURE: 159 MMHG

## 2018-06-03 VITALS — SYSTOLIC BLOOD PRESSURE: 140 MMHG | DIASTOLIC BLOOD PRESSURE: 84 MMHG

## 2018-06-03 VITALS — DIASTOLIC BLOOD PRESSURE: 82 MMHG | SYSTOLIC BLOOD PRESSURE: 138 MMHG

## 2018-06-03 VITALS — SYSTOLIC BLOOD PRESSURE: 138 MMHG | DIASTOLIC BLOOD PRESSURE: 86 MMHG

## 2018-06-03 LAB
ANION GAP SERPL CALC-SCNC: 12.2 MMOL/L (ref 8–16)
ANISOCYTOSIS BLD QL SMEAR: SLIGHT
BASOPHILS # BLD AUTO: 0 10*3/UL (ref 0–0.1)
BASOPHILS NFR BLD AUTO: 0.3 % (ref 0–1)
BUN SERPL-MCNC: 12 MG/DL (ref 7–26)
BUN/CREAT SERPL: 11 (ref 6–25)
CALCIUM SERPL-MCNC: 9.5 MG/DL (ref 8.4–10.2)
CHLORIDE SERPL-SCNC: 103 MMOL/L (ref 98–107)
CO2 SERPL-SCNC: 28 MMOL/L (ref 22–29)
DEPRECATED NEUTROPHILS # BLD AUTO: 2.2 10*3/UL (ref 2.1–6.9)
EGFRCR SERPLBLD CKD-EPI 2021: > 60 ML/MIN (ref 60–?)
EOSINOPHIL # BLD AUTO: 0.1 10*3/UL (ref 0–0.4)
EOSINOPHIL NFR BLD AUTO: 3.9 % (ref 0–6)
EOSINOPHIL NFR BLD MANUAL: 2 % (ref 0–7)
ERYTHROCYTE [DISTWIDTH] IN CORD BLOOD: 24.2 % (ref 11.7–14.4)
GLUCOSE SERPLBLD-MCNC: 107 MG/DL (ref 74–118)
HCT VFR BLD AUTO: 34.9 % (ref 38.2–49.6)
HGB BLD-MCNC: 11.4 G/DL (ref 14–18)
LYMPHOCYTES # BLD: 0.1 10*3/UL (ref 1–3.2)
LYMPHOCYTES NFR BLD AUTO: 4.3 % (ref 18–39.1)
LYMPHOCYTES NFR BLD MANUAL: 6 % (ref 19–48)
MAGNESIUM SERPL-MCNC: 2 MG/DL (ref 1.3–2.1)
MCH RBC QN AUTO: 29.8 PG (ref 28–32)
MCHC RBC AUTO-ENTMCNC: 32.7 G/DL (ref 31–35)
MCV RBC AUTO: 91.1 FL (ref 81–99)
METAMYELOCYTES NFR BLD MANUAL: 7 % (ref 0–0)
MONOCYTES # BLD AUTO: 0.5 10*3/UL (ref 0.2–0.8)
MONOCYTES NFR BLD AUTO: 17.1 % (ref 4.4–11.3)
MONOCYTES NFR BLD MANUAL: 13 % (ref 3.4–9)
NEUTS BAND NFR BLD MANUAL: 27 %
NEUTS SEG NFR BLD AUTO: 73.7 % (ref 38.7–80)
NEUTS SEG NFR BLD MANUAL: 45 % (ref 40–74)
PLAT MORPH BLD: NORMAL
PLATELET # BLD AUTO: 98 X10E3/UL (ref 140–360)
PLATELET # BLD EST: (no result) 10*3/UL
POTASSIUM SERPL-SCNC: 3.2 MMOL/L (ref 3.5–5.1)
RBC # BLD AUTO: 3.83 X10E6/UL (ref 4.3–5.7)
RBC MORPH BLD: (no result)
SODIUM SERPL-SCNC: 140 MMOL/L (ref 136–145)

## 2018-06-03 RX ADMIN — TAZOBACTAM SODIUM AND PIPERACILLIN SODIUM SCH MLS/HR: 375; 3 INJECTION, SOLUTION INTRAVENOUS at 22:15

## 2018-06-03 RX ADMIN — LORAZEPAM PRN MG: 2 INJECTION INTRAMUSCULAR; INTRAVENOUS at 17:05

## 2018-06-03 RX ADMIN — LORAZEPAM PRN MG: 2 INJECTION INTRAMUSCULAR; INTRAVENOUS at 23:06

## 2018-06-03 RX ADMIN — DIPHENHYDRAMINE HYDROCHLORIDE PRN MG: 50 INJECTION INTRAMUSCULAR; INTRAVENOUS at 06:50

## 2018-06-03 RX ADMIN — DIPHENHYDRAMINE HYDROCHLORIDE PRN MG: 50 INJECTION INTRAMUSCULAR; INTRAVENOUS at 01:24

## 2018-06-03 RX ADMIN — DIPHENHYDRAMINE HYDROCHLORIDE PRN MG: 50 INJECTION INTRAMUSCULAR; INTRAVENOUS at 19:49

## 2018-06-03 RX ADMIN — DIPHENHYDRAMINE HYDROCHLORIDE PRN MG: 50 INJECTION INTRAMUSCULAR; INTRAVENOUS at 14:21

## 2018-06-03 RX ADMIN — TAZOBACTAM SODIUM AND PIPERACILLIN SODIUM SCH MLS/HR: 375; 3 INJECTION, SOLUTION INTRAVENOUS at 06:37

## 2018-06-03 RX ADMIN — LORAZEPAM PRN MG: 2 INJECTION INTRAMUSCULAR; INTRAVENOUS at 04:15

## 2018-06-03 RX ADMIN — LORAZEPAM PRN MG: 2 INJECTION INTRAMUSCULAR; INTRAVENOUS at 10:24

## 2018-06-03 RX ADMIN — TAZOBACTAM SODIUM AND PIPERACILLIN SODIUM SCH MLS/HR: 375; 3 INJECTION, SOLUTION INTRAVENOUS at 14:21

## 2018-06-03 NOTE — DISCHARGE SUMMARY
NO DICTATION, LENGTH 2 SECONDS. 



 _________________________________

BARAK SHIRLEY MD



DD:  06/02/2018 14:06

DT:  06/03/2018 14:42

Job#:  [id number]

## 2018-06-03 NOTE — PROGRESS NOTE
DATE:  June 03, 2018 



INTERNAL MEDICINE PROGRESS NOTE



SUBJECTIVE:  Patient is doing well.  



PHYSICAL EXAMINATION 

VITALS:  Blood pressure 136/83.  Temperature 97.1, heart rate 88 per 

minute.  Respiratory rate is 16 per minute.  Oxygen saturation 98%.  

HEART:  Regular rhythm.  Normal S1 and S2 sounds. 

LUNGS:  Clear bilaterally. 

ABDOMEN:  Soft. 

EXTREMITIES:  No evidence of cyanosis, edema or trauma.



BLOOD WORK:  We have BMP with sodium 140, potassium 3.2, chloride 103, CO2 

28, BUN 12, creatinine 1.05.  Glucose 107.  On the CBC, white blood count 

3.04, hemoglobin 11.4, hematocrit 34.9, platelet count 98,000.  PT 12.9, 

PTT 26.1, INR 1.05, AST 55, ALT 28, total bilirubin 0.7, alkaline 

phosphatase 142.



FINAL IMPRESSION

1. Small-bowel obstruction.  

2. Carcinoma of the colon with metastases.  

3. Ulcerative colitis.  

4. Anemia.  

5. Leukopenia. 

6. Hypokalemia.



PLAN OF TREATMENT

1. Continue n.p.o. status.  

2. Continue NG tube to suctioning. 

3. Replace the potassium.  

4. Continue Dilaudid 1 mg IV q.4 h. as needed.  



1. Lorazepam 2 mg at bedtime and 1 mg IV q.6 h. as needed for anxiety.  

2. Benadryl 25 mg IV q.4 h. as needed.  

3. Continue TPN.  

4. We are going to recheck the potassium and magnesium levels tomorrow.  











DD:  06/03/2018 12:34

DT:  06/03/2018 15:35

Job#:  [id number]

## 2018-06-03 NOTE — HISTORY AND PHYSICAL
HISTORY OF PRESENT ILLNESS:  Patient is a 47-year-old male with past 

medical history positive for ulcerative colitis apparently, he has got a 

colostomy from prior abdominal surgery, came to the hospital with abdominal 

pain.



REVIEW OF SYSTEMS:

CARDIOVASCULAR:  No chest pain, no palpitation.

RESPIRATORY:  No shortness of breath, no cough.

GASTROINTESTINAL:  He had no nausea, but he had abdominal pain.  No 

vomiting, no diarrhea.

GENITOURINARY:  No frequency, no dysuria.



ALLERGIES:  HE IS NOT ALLERGIC TO ANY MEDICATION.



SOCIAL HISTORY:  He does not use any alcohol.



PAST MEDICAL HISTORY:  Ulcerative colitis, status post colostomy.



PHYSICAL EXAMINATION:  VITAL SIGNS:  Blood pressure 150/80, temperature 

__________, heart rate 104 per minute, respiratory rate 16 per minute, 

oxygen saturation 97%.



LABORATORY DATA:  On the lab work, we have BMP:  Sodium 141, potassium 3.8, 

chloride 100, CO2 26, BUN 11, creatinine 0.87, glucose 104.  On the CBC:  

White blood count 1.96, hemoglobin 12.8, hematocrit 38.5, platelet count 

172,000.  PT 12.5, INR 1.05, PTT 26.1.  AST 55, ALT 28, total bilirubin 

0.7, alkaline phosphatase 142.  CT of the abdomen showed small-bowel 

obstruction.



FINAL IMPRESSION:

1. Small-bowel obstruction.

2. Hypokalemia.

3. Hypertension.

4. Elevated liver function tests.

5. Anxiety disorder.



PLAN OF TREATMENT:  Continue n.p.o.  Continue NG tube to suctioning.  

Continue IV fluids, normal saline at 125 mL an hour, Zosyn 3.375 g IV 

piggyback q.8h., lorazepam 2 mg at bedtime and 1 mg IV every 6 hours as 

needed for agitation, Dilaudid 1 mg IV q.4h.  He is going to follow up with 

Dr. Harinder Ortiz, surgeon who is following the case.  We are going to 

replace the potassium, recheck potassium and magnesium levels.







DD:  06/02/2018 14:08

DT:  06/02/2018 22:37

Job#:  [id number]

## 2018-06-03 NOTE — DIAGNOSTIC IMAGING REPORT
EXAM: Abdomen 4  Views, including upright and decubitus

INDICATION: Small bowel obstruction 

\S\sbo

\S\[id number]

\S\0801

COMPARISON: KUB 6/1/2018, CT abdomen and pelvis 06/01/2018



FINDINGS:

NG/T2 with tip overlying the gastric body.

Mild of stool in the colon.

Mildly improved dilatation of small bowel with persistent air-fluid level. For

example, the small bowel has a diameter of 4 cm compared to 5.3 cm on prior

exams.



No renal calculi.



No abnormal soft tissue masses.



Minimal degenerative changes in the lumbar spine and pelvis.



IMPRESSION:

Mildly improved small bowel obstruction.



No pneumoperitoneum.



Signed by: Dr. Jayda Mckeon M.D. on 6/3/2018 8:55 AM

## 2018-06-04 VITALS — SYSTOLIC BLOOD PRESSURE: 148 MMHG | DIASTOLIC BLOOD PRESSURE: 91 MMHG

## 2018-06-04 VITALS — DIASTOLIC BLOOD PRESSURE: 90 MMHG | SYSTOLIC BLOOD PRESSURE: 145 MMHG

## 2018-06-04 VITALS — DIASTOLIC BLOOD PRESSURE: 88 MMHG | SYSTOLIC BLOOD PRESSURE: 134 MMHG

## 2018-06-04 VITALS — SYSTOLIC BLOOD PRESSURE: 168 MMHG | DIASTOLIC BLOOD PRESSURE: 96 MMHG

## 2018-06-04 VITALS — SYSTOLIC BLOOD PRESSURE: 134 MMHG | DIASTOLIC BLOOD PRESSURE: 88 MMHG

## 2018-06-04 VITALS — DIASTOLIC BLOOD PRESSURE: 79 MMHG | SYSTOLIC BLOOD PRESSURE: 144 MMHG

## 2018-06-04 VITALS — SYSTOLIC BLOOD PRESSURE: 139 MMHG | DIASTOLIC BLOOD PRESSURE: 66 MMHG

## 2018-06-04 LAB
ANION GAP SERPL CALC-SCNC: 13.5 MMOL/L (ref 8–16)
BUN SERPL-MCNC: 10 MG/DL (ref 7–26)
BUN/CREAT SERPL: 10 (ref 6–25)
CALCIUM SERPL-MCNC: 9.8 MG/DL (ref 8.4–10.2)
CHLORIDE SERPL-SCNC: 110 MMOL/L (ref 98–107)
CO2 SERPL-SCNC: 25 MMOL/L (ref 22–29)
EGFRCR SERPLBLD CKD-EPI 2021: > 60 ML/MIN (ref 60–?)
GLUCOSE SERPLBLD-MCNC: 104 MG/DL (ref 74–118)
POTASSIUM SERPL-SCNC: 3.5 MMOL/L (ref 3.5–5.1)
SODIUM SERPL-SCNC: 145 MMOL/L (ref 136–145)

## 2018-06-04 RX ADMIN — TAZOBACTAM SODIUM AND PIPERACILLIN SODIUM SCH MLS/HR: 375; 3 INJECTION, SOLUTION INTRAVENOUS at 13:30

## 2018-06-04 RX ADMIN — DIPHENHYDRAMINE HYDROCHLORIDE PRN MG: 50 INJECTION INTRAMUSCULAR; INTRAVENOUS at 00:43

## 2018-06-04 RX ADMIN — TAZOBACTAM SODIUM AND PIPERACILLIN SODIUM SCH MLS/HR: 375; 3 INJECTION, SOLUTION INTRAVENOUS at 21:55

## 2018-06-04 RX ADMIN — LORAZEPAM PRN MG: 2 INJECTION INTRAMUSCULAR; INTRAVENOUS at 21:56

## 2018-06-04 RX ADMIN — LORAZEPAM PRN MG: 2 INJECTION INTRAMUSCULAR; INTRAVENOUS at 11:08

## 2018-06-04 RX ADMIN — HYDROMORPHONE HYDROCHLORIDE PRN MG: 2 INJECTION INTRAMUSCULAR; INTRAVENOUS; SUBCUTANEOUS at 16:59

## 2018-06-04 RX ADMIN — TAZOBACTAM SODIUM AND PIPERACILLIN SODIUM SCH MLS/HR: 375; 3 INJECTION, SOLUTION INTRAVENOUS at 06:24

## 2018-06-04 RX ADMIN — DIPHENHYDRAMINE HYDROCHLORIDE PRN MG: 50 INJECTION INTRAMUSCULAR; INTRAVENOUS at 09:13

## 2018-06-04 RX ADMIN — HYDROMORPHONE HYDROCHLORIDE PRN MG: 2 INJECTION INTRAMUSCULAR; INTRAVENOUS; SUBCUTANEOUS at 13:44

## 2018-06-04 RX ADMIN — LORAZEPAM PRN MG: 2 INJECTION INTRAMUSCULAR; INTRAVENOUS at 05:09

## 2018-06-04 NOTE — CONSULTATION
DATE OF CONSULTATION:  June 01, 2018 



HEMATOLOGY-ONCOLOGY CONSULTATION 



REFERRING PHYSICIAN:  Dr. Bruno Corral.



HISTORY OF PRESENT ILLNESS:  Dale Ruiz is a 47-year-old male who was 

referred to me for evaluation of partial bowel obstruction.  The patient is 

known to me since January 2018.  The patient was referred to me for 

evaluation of anemia.  The patient in the past had ulcerative colitis.  The 

patient was seen by me in 2014.  The patient had an adenoma which had 

high-grade dysplasia, which was on June 20, 2014.  The patient was 

suggested a total colectomy.  However, the patient did not undergo a total 

colectomy.  Subsequently in January of 2018, the patient was found to have 

massive intraperitoneal metastases.  The patient subsequently had any 

ileostomy, has been treated with systemic chemotherapy consisting FOLFOX-4, 

and the patient now has increasing CEA.  I have called Dr. Clifton for a 

KRAS mutation.



SOCIAL HISTORY:  Noncontributory.



FAMILY HISTORY:  Noncontributory.



ALLERGIES REPORTED:  None.



MEDICATIONS AT THIS TIME:

1. Zosyn.

2. Sodium chloride.

3. Lorazepam.

4. Diphenhydramine.



REVIEW OF SYSTEMS:   

HEENT:  Normal. 

CARDIAC:  Normal. 

RESPIRATORY:  Normal.

GI:  Massive intraperitoneal metastases, ulcerative colitis since age 13, 

status post an ileostomy. 

:  Normal. 

MUSCULOSKELETAL:  Normal. 

SKIN AND BREASTS:  Normal. 

NEUROENDOCRINE:  Normal. 

 

PHYSICAL EXAMINATION:   

GENERAL:  A large-built male, tall.  No palpable adenopathy. 

HEART:  Within normal limits. 

LUNGS:  Clear. 

ABDOMEN:  Is obese.  Nasogastric tube is seen.  Ileostomy is empty. 

RECTAL:  Exam deferred. 

CENTRAL NERVOUS SYSTEM:  Essentially normal. 



LABORATORY DATA:  Shows a hemoglobin of 12.8, hematocrit of 38.5, white 

count of 1900, platelets are reported at 172,000.  Absolute neutrophil 

count is only 800.  Chemistry shows a sodium of 135, potassium 2.9, 

chloride is 96, CO2 26, calcium 9.8, magnesium low at 1.2.  SGOT 60, SGPT 

30, alkaline phosphatase 154.  Total protein 7.2, albumin 3.4.  Globulin is 

3.8. 



IMPRESSION:  

1. Anemia.

2. Neutropenia.

3. Thrombocytopenia.

4. Peritoneal metastases.

5. Partial bowel obstruction.

6. Hypokalemia.

7. Hypoalbuminemia.

8. High liver function test.



PLAN, COMMENTS AND SUGGESTIONS:  Suggest to continue nasogastric suction.  

New potassium supplement.  TPN.  I have called Dr. Clifton for KRAS 

mutation.  



 





DD:  06/04/2018 09:16

DT:  06/04/2018 13:17

Job#:  [id number]

cc:MD JAMES HO MD

## 2018-06-04 NOTE — DIAGNOSTIC IMAGING REPORT
PROCEDURE:ABDOMEN COMP INCL UPR OR DECUB

TECHNIQUE:Upright and supine AP views abdomen

INDICATION:Bowel obstruction

COMPARISON:Patients [facility], CT, CT ABDOMEN/PELVIS W, 

6/01/2018, 8:45.  Patients [facility], DX, ABDOMEN-1VIEW (KUB), 

6/01/2018, 10:54.  Patients [facility], DX, ABDOMEN COMP INCL UPR 

OR DECUB, 6/03/2018, 7:44.

 

FINDINGS:

Nasogastric tube tip at gastric body.

 

Persistent small bowel distention with multiple air-fluid levels 

consistent with bowel obstruction. No conspicuous pneumatosis or 

pneumoperitoneum.

 

 

CONCLUSION:

Findings consistent with small bowel obstruction unchanged relative to 

[place] 3, 2018.

 

 

 

Dictated by:  Esteban Domínguez M.D. on 6/04/2018 at 9:09     

Electronically approved by:  Esteban Domínguez M.D. on 6/04/2018 at 

9:09

## 2018-06-05 VITALS — DIASTOLIC BLOOD PRESSURE: 76 MMHG | SYSTOLIC BLOOD PRESSURE: 135 MMHG

## 2018-06-05 VITALS — DIASTOLIC BLOOD PRESSURE: 94 MMHG | SYSTOLIC BLOOD PRESSURE: 155 MMHG

## 2018-06-05 VITALS — DIASTOLIC BLOOD PRESSURE: 77 MMHG | SYSTOLIC BLOOD PRESSURE: 126 MMHG

## 2018-06-05 VITALS — SYSTOLIC BLOOD PRESSURE: 163 MMHG | DIASTOLIC BLOOD PRESSURE: 97 MMHG

## 2018-06-05 VITALS — DIASTOLIC BLOOD PRESSURE: 99 MMHG | SYSTOLIC BLOOD PRESSURE: 155 MMHG

## 2018-06-05 VITALS — SYSTOLIC BLOOD PRESSURE: 143 MMHG | DIASTOLIC BLOOD PRESSURE: 93 MMHG

## 2018-06-05 RX ADMIN — LORAZEPAM PRN MG: 2 INJECTION INTRAMUSCULAR; INTRAVENOUS at 04:13

## 2018-06-05 RX ADMIN — HYDROMORPHONE HYDROCHLORIDE PRN MG: 2 INJECTION INTRAMUSCULAR; INTRAVENOUS; SUBCUTANEOUS at 10:14

## 2018-06-05 RX ADMIN — TAZOBACTAM SODIUM AND PIPERACILLIN SODIUM SCH MLS/HR: 375; 3 INJECTION, SOLUTION INTRAVENOUS at 06:33

## 2018-06-05 RX ADMIN — TAZOBACTAM SODIUM AND PIPERACILLIN SODIUM SCH MLS/HR: 375; 3 INJECTION, SOLUTION INTRAVENOUS at 13:29

## 2018-06-05 RX ADMIN — HYDROMORPHONE HYDROCHLORIDE PRN MG: 2 INJECTION INTRAMUSCULAR; INTRAVENOUS; SUBCUTANEOUS at 21:44

## 2018-06-05 RX ADMIN — LORAZEPAM PRN MG: 2 INJECTION INTRAMUSCULAR; INTRAVENOUS at 13:14

## 2018-06-05 RX ADMIN — LORAZEPAM PRN MG: 2 INJECTION INTRAMUSCULAR; INTRAVENOUS at 22:33

## 2018-06-05 RX ADMIN — HYDROMORPHONE HYDROCHLORIDE PRN MG: 2 INJECTION INTRAMUSCULAR; INTRAVENOUS; SUBCUTANEOUS at 16:57

## 2018-06-05 RX ADMIN — TAZOBACTAM SODIUM AND PIPERACILLIN SODIUM SCH MLS/HR: 375; 3 INJECTION, SOLUTION INTRAVENOUS at 21:43

## 2018-06-05 RX ADMIN — DIPHENHYDRAMINE HYDROCHLORIDE PRN MG: 50 INJECTION INTRAMUSCULAR; INTRAVENOUS at 08:18

## 2018-06-05 RX ADMIN — DIPHENHYDRAMINE HYDROCHLORIDE PRN MG: 50 INJECTION INTRAMUSCULAR; INTRAVENOUS at 02:28

## 2018-06-05 NOTE — DIAGNOSTIC IMAGING REPORT
PROCEDURE:ABDOMEN COMP INCL UPR OR DECUB

INDICATION:Small bowel obstruction.

COMPARISON:Patients [facility], CT, CT ABDOMEN/PELVIS W, 

6/01/2018, 8:45.  Patients [facility], DX, SMALL BOWEL SERIES, 

6/04/2018, 11:17.

 

FINDINGS: Unchanged enteric tube.

Persistent mildly dilated loops of small bowel are noted in the left 

abdomen, with maximal measurement of 4.5 cm. Oral contrast is noted in 

right-sided bowel loops, which are normal in caliber. The.

No pneumoperitoneum. Bibasilar atelectatic changes.

No acute bony abnormalities.

No abnormal calcifications..

 

 

CONCLUSION:

1. Persistent mildly dilated air-filled loops of small bowel in the 

left abdomen, measuring approximately 4.5 cm, suggesting mild small 

bowel obstruction

 

 

 

 

Frandy Monaco M.D.  

Dictated by:  Frandy Monaco M.D. on 6/05/2018 at 19:37     

Electronically approved by:  Frandy Monaco M.D. on 6/05/2018 at 

19:37

## 2018-06-05 NOTE — DIAGNOSTIC IMAGING REPORT
EXAM:  SMALL BOWEL FOLLOW THROUGH

INDICATION:       \S\small bowel obstruction. Subtotal colectomy with right

lower quadrant ileostomy. History of ulcerative colitis with dysplastic

changes. Evaluate for possible metastatic disease to small bowel.

COMPARISON:  KUB 6/4/2018. 6/3/2018. 6/1/2018. CT abdomen and pelvis 6/1/2018.

1/22/2018.



TECHNIQUE:  Small bowel follow through exam was performed using oral

Gastrografin.   KUB was obtained before the administration of contrast and

serial overhead images were obtained after administration of oral Gastrografin.

 Fluoroscopy was performed and spot images were obtained



RADIATION DOSE:

     Fluoroscopy Time:  0.6  min

     Dose Area Product: 7.252  Gycm2 

     Air Kerma (AK) : 21.39 mGy



FINDINGS: 

: 

NG tube in the stomach. Small bowel loops in the left upper quadrant measures

up to 6.3 cm in diameter. Air is seen in the right colon. Paucity of air with

mild stool in the left colon.  



STOMACH:  Limited and unremarkable. Persistent contrast in the stomach even

after 4 hours with persistent reflux identified. This may be secondary to the

NG tube in place.



DUODENUM:  

Bulb and sweep are normal.  

Duodenal-jejunal junction is in the normal expected position.



SMALL BOWEL:

   Motility:  Within normal limits.

   Mucosa:  Unremarkable.

   Distensibility:  Normal.

   Distribution:  Normal.

   Transit time: Contrast reaches small bowel loops of the right abdomen. 



TERMINAL ILEUM: Normal.



IMPRESSION: 

1.  Findings consistent with partial small bowel obstruction with delayed

transit time and dilated small bowel loops. Oral contrast has not reached the

right lower quadrant ostomy x 4 hours time.

2.  Persistent oral contrast of the stomach even at 4 hours time.

3.  CT abdomen and pelvis 6/1/2018, there is a bowel containing parastomal

hernia, which is the point of transition and is likely the source of

obstruction.



Signed by: Dr. Alberto Tompkins M.D. on 6/5/2018 4:05 PM

## 2018-06-06 VITALS — SYSTOLIC BLOOD PRESSURE: 155 MMHG | DIASTOLIC BLOOD PRESSURE: 99 MMHG

## 2018-06-06 VITALS — SYSTOLIC BLOOD PRESSURE: 155 MMHG | DIASTOLIC BLOOD PRESSURE: 94 MMHG

## 2018-06-06 VITALS — DIASTOLIC BLOOD PRESSURE: 94 MMHG | SYSTOLIC BLOOD PRESSURE: 161 MMHG

## 2018-06-06 VITALS — DIASTOLIC BLOOD PRESSURE: 102 MMHG | SYSTOLIC BLOOD PRESSURE: 156 MMHG

## 2018-06-06 VITALS — SYSTOLIC BLOOD PRESSURE: 139 MMHG | DIASTOLIC BLOOD PRESSURE: 90 MMHG

## 2018-06-06 VITALS — SYSTOLIC BLOOD PRESSURE: 157 MMHG | DIASTOLIC BLOOD PRESSURE: 91 MMHG

## 2018-06-06 VITALS — DIASTOLIC BLOOD PRESSURE: 100 MMHG | SYSTOLIC BLOOD PRESSURE: 148 MMHG

## 2018-06-06 LAB
ALBUMIN SERPL-MCNC: 3 G/DL (ref 3.5–5)
ALBUMIN/GLOB SERPL: 0.7 {RATIO} (ref 0.8–2)
ALP SERPL-CCNC: 162 IU/L (ref 40–150)
ALT SERPL-CCNC: 15 IU/L (ref 0–55)
ANION GAP SERPL CALC-SCNC: 14.6 MMOL/L (ref 8–16)
BUN SERPL-MCNC: 14 MG/DL (ref 7–26)
BUN/CREAT SERPL: 14 (ref 6–25)
CALCIUM SERPL-MCNC: 9.8 MG/DL (ref 8.4–10.2)
CHLORIDE SERPL-SCNC: 106 MMOL/L (ref 98–107)
CO2 SERPL-SCNC: 24 MMOL/L (ref 22–29)
EGFRCR SERPLBLD CKD-EPI 2021: > 60 ML/MIN (ref 60–?)
GLOBULIN PLAS-MCNC: 4.1 G/DL (ref 2.3–3.5)
GLUCOSE SERPLBLD-MCNC: 107 MG/DL (ref 74–118)
POTASSIUM SERPL-SCNC: 3.6 MMOL/L (ref 3.5–5.1)
SODIUM SERPL-SCNC: 141 MMOL/L (ref 136–145)

## 2018-06-06 RX ADMIN — LORAZEPAM PRN MG: 2 INJECTION INTRAMUSCULAR; INTRAVENOUS at 12:07

## 2018-06-06 RX ADMIN — HYDROMORPHONE HYDROCHLORIDE PRN MG: 2 INJECTION INTRAMUSCULAR; INTRAVENOUS; SUBCUTANEOUS at 06:15

## 2018-06-06 RX ADMIN — DIPHENHYDRAMINE HYDROCHLORIDE PRN MG: 50 INJECTION INTRAMUSCULAR; INTRAVENOUS at 14:09

## 2018-06-06 RX ADMIN — TRAMADOL HYDROCHLORIDE AND ACETAMINOPHEN PRN EA: 325; 37.5 TABLET, COATED ORAL at 14:45

## 2018-06-06 RX ADMIN — TAZOBACTAM SODIUM AND PIPERACILLIN SODIUM SCH MLS/HR: 375; 3 INJECTION, SOLUTION INTRAVENOUS at 05:43

## 2018-06-06 RX ADMIN — LORAZEPAM PRN MG: 2 INJECTION INTRAMUSCULAR; INTRAVENOUS at 05:43

## 2018-06-06 RX ADMIN — HYDROMORPHONE HYDROCHLORIDE PRN MG: 2 INJECTION INTRAMUSCULAR; INTRAVENOUS; SUBCUTANEOUS at 10:32

## 2018-06-06 RX ADMIN — HYDROMORPHONE HYDROCHLORIDE PRN MG: 2 INJECTION INTRAMUSCULAR; INTRAVENOUS; SUBCUTANEOUS at 02:42

## 2018-06-06 RX ADMIN — DIPHENHYDRAMINE HYDROCHLORIDE PRN MG: 50 INJECTION INTRAMUSCULAR; INTRAVENOUS at 09:00

## 2018-06-06 RX ADMIN — TRAMADOL HYDROCHLORIDE AND ACETAMINOPHEN PRN EA: 325; 37.5 TABLET, COATED ORAL at 23:00

## 2018-06-06 RX ADMIN — LORAZEPAM PRN MG: 2 INJECTION INTRAMUSCULAR; INTRAVENOUS at 18:21

## 2018-06-06 RX ADMIN — DIPHENHYDRAMINE HYDROCHLORIDE PRN MG: 50 INJECTION INTRAMUSCULAR; INTRAVENOUS at 18:35

## 2018-06-06 RX ADMIN — DIPHENHYDRAMINE HYDROCHLORIDE PRN MG: 50 INJECTION INTRAMUSCULAR; INTRAVENOUS at 00:08

## 2018-06-06 RX ADMIN — DIPHENHYDRAMINE HYDROCHLORIDE PRN MG: 50 INJECTION INTRAMUSCULAR; INTRAVENOUS at 22:45

## 2018-06-06 RX ADMIN — TAZOBACTAM SODIUM AND PIPERACILLIN SODIUM SCH MLS/HR: 375; 3 INJECTION, SOLUTION INTRAVENOUS at 13:55

## 2018-06-06 RX ADMIN — DIPHENHYDRAMINE HYDROCHLORIDE PRN MG: 50 INJECTION INTRAMUSCULAR; INTRAVENOUS at 04:27

## 2018-06-06 RX ADMIN — TAZOBACTAM SODIUM AND PIPERACILLIN SODIUM SCH MLS/HR: 375; 3 INJECTION, SOLUTION INTRAVENOUS at 22:10

## 2018-06-07 VITALS — DIASTOLIC BLOOD PRESSURE: 95 MMHG | SYSTOLIC BLOOD PRESSURE: 144 MMHG

## 2018-06-07 VITALS — SYSTOLIC BLOOD PRESSURE: 136 MMHG | DIASTOLIC BLOOD PRESSURE: 91 MMHG

## 2018-06-07 VITALS — DIASTOLIC BLOOD PRESSURE: 98 MMHG | SYSTOLIC BLOOD PRESSURE: 149 MMHG

## 2018-06-07 VITALS — SYSTOLIC BLOOD PRESSURE: 148 MMHG | DIASTOLIC BLOOD PRESSURE: 99 MMHG

## 2018-06-07 VITALS — DIASTOLIC BLOOD PRESSURE: 89 MMHG | SYSTOLIC BLOOD PRESSURE: 152 MMHG

## 2018-06-07 VITALS — SYSTOLIC BLOOD PRESSURE: 153 MMHG | DIASTOLIC BLOOD PRESSURE: 98 MMHG

## 2018-06-07 VITALS — SYSTOLIC BLOOD PRESSURE: 149 MMHG | DIASTOLIC BLOOD PRESSURE: 98 MMHG

## 2018-06-07 LAB
ANION GAP SERPL CALC-SCNC: 13.7 MMOL/L (ref 8–16)
BASOPHILS # BLD AUTO: 0.1 10*3/UL (ref 0–0.1)
BASOPHILS NFR BLD AUTO: 0.9 % (ref 0–1)
BUN SERPL-MCNC: 16 MG/DL (ref 7–26)
BUN/CREAT SERPL: 19 (ref 6–25)
CALCIUM SERPL-MCNC: 9.9 MG/DL (ref 8.4–10.2)
CHLORIDE SERPL-SCNC: 105 MMOL/L (ref 98–107)
CO2 SERPL-SCNC: 22 MMOL/L (ref 22–29)
DEPRECATED NEUTROPHILS # BLD AUTO: 4.8 10*3/UL (ref 2.1–6.9)
EGFRCR SERPLBLD CKD-EPI 2021: > 60 ML/MIN (ref 60–?)
EOSINOPHIL # BLD AUTO: 0.2 10*3/UL (ref 0–0.4)
EOSINOPHIL NFR BLD AUTO: 2.1 % (ref 0–6)
ERYTHROCYTE [DISTWIDTH] IN CORD BLOOD: 23.1 % (ref 11.7–14.4)
GLUCOSE SERPLBLD-MCNC: 119 MG/DL (ref 74–118)
HCT VFR BLD AUTO: 33.4 % (ref 38.2–49.6)
HGB BLD-MCNC: 11 G/DL (ref 14–18)
LYMPHOCYTES # BLD: 0.2 10*3/UL (ref 1–3.2)
LYMPHOCYTES NFR BLD AUTO: 2.5 % (ref 18–39.1)
MCH RBC QN AUTO: 29.6 PG (ref 28–32)
MCHC RBC AUTO-ENTMCNC: 32.9 G/DL (ref 31–35)
MCV RBC AUTO: 90 FL (ref 81–99)
MONOCYTES # BLD AUTO: 1.4 10*3/UL (ref 0.2–0.8)
MONOCYTES NFR BLD AUTO: 18.6 % (ref 4.4–11.3)
NEUTS SEG NFR BLD AUTO: 62.6 % (ref 38.7–80)
PLATELET # BLD AUTO: 118 X10E3/UL (ref 140–360)
POTASSIUM SERPL-SCNC: 3.7 MMOL/L (ref 3.5–5.1)
RBC # BLD AUTO: 3.71 X10E6/UL (ref 4.3–5.7)
SODIUM SERPL-SCNC: 137 MMOL/L (ref 136–145)

## 2018-06-07 RX ADMIN — TRAMADOL HYDROCHLORIDE AND ACETAMINOPHEN PRN EA: 325; 37.5 TABLET, COATED ORAL at 12:45

## 2018-06-07 RX ADMIN — LORAZEPAM PRN MG: 2 INJECTION INTRAMUSCULAR; INTRAVENOUS at 00:21

## 2018-06-07 RX ADMIN — LORAZEPAM PRN MG: 2 INJECTION INTRAMUSCULAR; INTRAVENOUS at 21:20

## 2018-06-07 RX ADMIN — DIPHENHYDRAMINE HYDROCHLORIDE PRN MG: 50 INJECTION INTRAMUSCULAR; INTRAVENOUS at 02:35

## 2018-06-07 RX ADMIN — TAZOBACTAM SODIUM AND PIPERACILLIN SODIUM SCH MLS/HR: 375; 3 INJECTION, SOLUTION INTRAVENOUS at 05:52

## 2018-06-07 RX ADMIN — TRAMADOL HYDROCHLORIDE AND ACETAMINOPHEN PRN EA: 325; 37.5 TABLET, COATED ORAL at 05:08

## 2018-06-07 RX ADMIN — LORAZEPAM PRN MG: 2 INJECTION INTRAMUSCULAR; INTRAVENOUS at 13:34

## 2018-06-07 RX ADMIN — DIPHENHYDRAMINE HYDROCHLORIDE PRN MG: 50 INJECTION INTRAMUSCULAR; INTRAVENOUS at 17:20

## 2018-06-07 RX ADMIN — TAZOBACTAM SODIUM AND PIPERACILLIN SODIUM SCH MLS/HR: 375; 3 INJECTION, SOLUTION INTRAVENOUS at 22:13

## 2018-06-07 RX ADMIN — DIPHENHYDRAMINE HYDROCHLORIDE PRN MG: 50 INJECTION INTRAMUSCULAR; INTRAVENOUS at 06:40

## 2018-06-07 RX ADMIN — DIPHENHYDRAMINE HYDROCHLORIDE PRN MG: 50 INJECTION INTRAMUSCULAR; INTRAVENOUS at 17:42

## 2018-06-07 RX ADMIN — DIPHENHYDRAMINE HYDROCHLORIDE PRN MG: 50 INJECTION INTRAMUSCULAR; INTRAVENOUS at 10:47

## 2018-06-07 RX ADMIN — TRAMADOL HYDROCHLORIDE AND ACETAMINOPHEN PRN EA: 325; 37.5 TABLET, COATED ORAL at 18:58

## 2018-06-07 RX ADMIN — DIPHENHYDRAMINE HYDROCHLORIDE PRN MG: 50 INJECTION INTRAMUSCULAR; INTRAVENOUS at 22:17

## 2018-06-07 RX ADMIN — LORAZEPAM PRN MG: 2 INJECTION INTRAMUSCULAR; INTRAVENOUS at 07:20

## 2018-06-07 RX ADMIN — TAZOBACTAM SODIUM AND PIPERACILLIN SODIUM SCH MLS/HR: 375; 3 INJECTION, SOLUTION INTRAVENOUS at 13:34

## 2018-06-08 VITALS — DIASTOLIC BLOOD PRESSURE: 97 MMHG | SYSTOLIC BLOOD PRESSURE: 157 MMHG

## 2018-06-08 VITALS — DIASTOLIC BLOOD PRESSURE: 90 MMHG | SYSTOLIC BLOOD PRESSURE: 145 MMHG

## 2018-06-08 VITALS — DIASTOLIC BLOOD PRESSURE: 89 MMHG | SYSTOLIC BLOOD PRESSURE: 144 MMHG

## 2018-06-08 VITALS — DIASTOLIC BLOOD PRESSURE: 98 MMHG | SYSTOLIC BLOOD PRESSURE: 165 MMHG

## 2018-06-08 VITALS — SYSTOLIC BLOOD PRESSURE: 150 MMHG | DIASTOLIC BLOOD PRESSURE: 101 MMHG

## 2018-06-08 RX ADMIN — TRAMADOL HYDROCHLORIDE AND ACETAMINOPHEN PRN EA: 325; 37.5 TABLET, COATED ORAL at 15:39

## 2018-06-08 RX ADMIN — DEXTROSE, SODIUM CHLORIDE, AND POTASSIUM CHLORIDE SCH MLS/HR: 5; .9; .15 INJECTION INTRAVENOUS at 23:51

## 2018-06-08 RX ADMIN — DIPHENHYDRAMINE HYDROCHLORIDE PRN MG: 50 INJECTION INTRAMUSCULAR; INTRAVENOUS at 06:41

## 2018-06-08 RX ADMIN — LORAZEPAM PRN MG: 2 INJECTION INTRAMUSCULAR; INTRAVENOUS at 22:30

## 2018-06-08 RX ADMIN — DIPHENHYDRAMINE HYDROCHLORIDE PRN MG: 50 INJECTION INTRAMUSCULAR; INTRAVENOUS at 11:32

## 2018-06-08 RX ADMIN — DIPHENHYDRAMINE HYDROCHLORIDE PRN MG: 50 INJECTION INTRAMUSCULAR; INTRAVENOUS at 23:51

## 2018-06-08 RX ADMIN — LISINOPRIL SCH MG: 10 TABLET ORAL at 21:00

## 2018-06-08 RX ADMIN — TAZOBACTAM SODIUM AND PIPERACILLIN SODIUM SCH MLS/HR: 375; 3 INJECTION, SOLUTION INTRAVENOUS at 06:12

## 2018-06-08 RX ADMIN — DIPHENHYDRAMINE HYDROCHLORIDE PRN MG: 50 INJECTION INTRAMUSCULAR; INTRAVENOUS at 02:35

## 2018-06-08 RX ADMIN — LORAZEPAM PRN MG: 2 INJECTION INTRAMUSCULAR; INTRAVENOUS at 04:40

## 2018-06-08 RX ADMIN — METHOCARBAMOL SCH MG: 500 TABLET ORAL at 22:44

## 2018-06-08 RX ADMIN — LORAZEPAM PRN MG: 2 INJECTION INTRAMUSCULAR; INTRAVENOUS at 16:31

## 2018-06-08 RX ADMIN — DIPHENHYDRAMINE HYDROCHLORIDE PRN MG: 50 INJECTION INTRAMUSCULAR; INTRAVENOUS at 15:39

## 2018-06-08 RX ADMIN — DEXTROSE, SODIUM CHLORIDE, AND POTASSIUM CHLORIDE SCH MLS/HR: 5; .9; .15 INJECTION INTRAVENOUS at 11:25

## 2018-06-08 RX ADMIN — TRAMADOL HYDROCHLORIDE AND ACETAMINOPHEN PRN EA: 325; 37.5 TABLET, COATED ORAL at 21:52

## 2018-06-08 RX ADMIN — DIPHENHYDRAMINE HYDROCHLORIDE PRN MG: 50 INJECTION INTRAMUSCULAR; INTRAVENOUS at 19:52

## 2018-06-08 RX ADMIN — LORAZEPAM PRN MG: 2 INJECTION INTRAMUSCULAR; INTRAVENOUS at 10:40

## 2018-06-09 VITALS — DIASTOLIC BLOOD PRESSURE: 91 MMHG | SYSTOLIC BLOOD PRESSURE: 142 MMHG

## 2018-06-09 VITALS — SYSTOLIC BLOOD PRESSURE: 152 MMHG | DIASTOLIC BLOOD PRESSURE: 95 MMHG

## 2018-06-09 VITALS — DIASTOLIC BLOOD PRESSURE: 89 MMHG | SYSTOLIC BLOOD PRESSURE: 152 MMHG

## 2018-06-09 VITALS — DIASTOLIC BLOOD PRESSURE: 88 MMHG | SYSTOLIC BLOOD PRESSURE: 145 MMHG

## 2018-06-09 VITALS — SYSTOLIC BLOOD PRESSURE: 145 MMHG | DIASTOLIC BLOOD PRESSURE: 88 MMHG

## 2018-06-09 VITALS — SYSTOLIC BLOOD PRESSURE: 163 MMHG | DIASTOLIC BLOOD PRESSURE: 97 MMHG

## 2018-06-09 VITALS — DIASTOLIC BLOOD PRESSURE: 97 MMHG | SYSTOLIC BLOOD PRESSURE: 156 MMHG

## 2018-06-09 VITALS — SYSTOLIC BLOOD PRESSURE: 152 MMHG | DIASTOLIC BLOOD PRESSURE: 89 MMHG

## 2018-06-09 RX ADMIN — HYDROMORPHONE HYDROCHLORIDE PRN MG: 1 INJECTION, SOLUTION INTRAMUSCULAR; INTRAVENOUS; SUBCUTANEOUS at 09:47

## 2018-06-09 RX ADMIN — METHOCARBAMOL SCH MG: 500 TABLET ORAL at 09:00

## 2018-06-09 RX ADMIN — LORAZEPAM PRN MG: 2 INJECTION INTRAMUSCULAR; INTRAVENOUS at 18:17

## 2018-06-09 RX ADMIN — HYDROMORPHONE HYDROCHLORIDE PRN MG: 1 INJECTION, SOLUTION INTRAMUSCULAR; INTRAVENOUS; SUBCUTANEOUS at 23:14

## 2018-06-09 RX ADMIN — LORAZEPAM PRN MG: 2 INJECTION INTRAMUSCULAR; INTRAVENOUS at 12:00

## 2018-06-09 RX ADMIN — DEXTROSE, SODIUM CHLORIDE, AND POTASSIUM CHLORIDE SCH MLS/HR: 5; .9; .15 INJECTION INTRAVENOUS at 14:59

## 2018-06-09 RX ADMIN — DIPHENHYDRAMINE HYDROCHLORIDE PRN MG: 50 INJECTION INTRAMUSCULAR; INTRAVENOUS at 20:08

## 2018-06-09 RX ADMIN — LORAZEPAM PRN MG: 2 INJECTION INTRAMUSCULAR; INTRAVENOUS at 04:43

## 2018-06-09 RX ADMIN — METHOCARBAMOL SCH MG: 500 TABLET ORAL at 15:00

## 2018-06-09 RX ADMIN — HYDROMORPHONE HYDROCHLORIDE PRN MG: 1 INJECTION, SOLUTION INTRAMUSCULAR; INTRAVENOUS; SUBCUTANEOUS at 06:00

## 2018-06-09 RX ADMIN — DIPHENHYDRAMINE HYDROCHLORIDE PRN MG: 50 INJECTION INTRAMUSCULAR; INTRAVENOUS at 03:51

## 2018-06-09 RX ADMIN — TRAMADOL HYDROCHLORIDE AND ACETAMINOPHEN PRN EA: 325; 37.5 TABLET, COATED ORAL at 03:52

## 2018-06-09 RX ADMIN — HYDROMORPHONE HYDROCHLORIDE PRN MG: 1 INJECTION, SOLUTION INTRAMUSCULAR; INTRAVENOUS; SUBCUTANEOUS at 20:09

## 2018-06-09 RX ADMIN — HYDROMORPHONE HYDROCHLORIDE PRN MG: 1 INJECTION, SOLUTION INTRAMUSCULAR; INTRAVENOUS; SUBCUTANEOUS at 02:51

## 2018-06-09 RX ADMIN — LISINOPRIL SCH MG: 10 TABLET ORAL at 11:50

## 2018-06-09 RX ADMIN — DIPHENHYDRAMINE HYDROCHLORIDE PRN MG: 50 INJECTION INTRAMUSCULAR; INTRAVENOUS at 15:45

## 2018-06-09 RX ADMIN — METHOCARBAMOL SCH MG: 500 TABLET ORAL at 20:08

## 2018-06-09 RX ADMIN — HYDROMORPHONE HYDROCHLORIDE PRN MG: 1 INJECTION, SOLUTION INTRAMUSCULAR; INTRAVENOUS; SUBCUTANEOUS at 15:45

## 2018-06-09 RX ADMIN — DIPHENHYDRAMINE HYDROCHLORIDE PRN MG: 50 INJECTION INTRAMUSCULAR; INTRAVENOUS at 11:09

## 2018-06-09 NOTE — DIAGNOSTIC IMAGING REPORT
EXAMINATION:  ABDOMEN ACUTE SERIES W/PA CXR    6/9/2018 8:00 AM



COMPARISON:  Multiple priors, most recently 6/5/2018 and 6/4/2018.



INDICATION: Abdominal pain

     

DISCUSSION:

2 views of the abdomen (AP supine and upright)

One additional PA view of the chest.



Left chest wall port catheter has its tip at the confluence of the

brachiocephalic veins. There are low lung volumes with bibasilar atelectasis.



Persistent diffuse dilatation of the small bowel loops, measuring up to 8.5 cm.

Elongated air-fluid levels are present on the upright view. No residual

contrast is identified.

No evidence of pneumoperitoneum.



No abnormal calcifications projected over the urinary tracts.





IMPRESSION: 

Findings consistent with at least partial small bowel obstruction. There are

persistently dilated small bowel loops and air-fluid levels.



Of note, enteric contrast from small bowel follow-through from 6/4/2018 is no

longer present.



No pneumoperitoneum.



Scott Colon MD



Signed by: Dr. Scott Colon M.D. on 6/9/2018 8:42 AM

## 2018-06-10 VITALS — DIASTOLIC BLOOD PRESSURE: 80 MMHG | SYSTOLIC BLOOD PRESSURE: 154 MMHG

## 2018-06-10 VITALS — DIASTOLIC BLOOD PRESSURE: 78 MMHG | SYSTOLIC BLOOD PRESSURE: 128 MMHG

## 2018-06-10 VITALS — SYSTOLIC BLOOD PRESSURE: 158 MMHG | DIASTOLIC BLOOD PRESSURE: 94 MMHG

## 2018-06-10 VITALS — SYSTOLIC BLOOD PRESSURE: 136 MMHG | DIASTOLIC BLOOD PRESSURE: 91 MMHG

## 2018-06-10 VITALS — DIASTOLIC BLOOD PRESSURE: 88 MMHG | SYSTOLIC BLOOD PRESSURE: 156 MMHG

## 2018-06-10 VITALS — SYSTOLIC BLOOD PRESSURE: 135 MMHG | DIASTOLIC BLOOD PRESSURE: 84 MMHG

## 2018-06-10 LAB
ALBUMIN SERPL-MCNC: 2.8 G/DL (ref 3.5–5)
ALBUMIN/GLOB SERPL: 0.6 {RATIO} (ref 0.8–2)
ALP SERPL-CCNC: 409 IU/L (ref 40–150)
ALT SERPL-CCNC: 25 IU/L (ref 0–55)
ANION GAP SERPL CALC-SCNC: 18.2 MMOL/L (ref 8–16)
BASOPHILS # BLD AUTO: 0.1 10*3/UL (ref 0–0.1)
BASOPHILS NFR BLD AUTO: 0.5 % (ref 0–1)
BUN SERPL-MCNC: 35 MG/DL (ref 7–26)
BUN/CREAT SERPL: 22 (ref 6–25)
CALCIUM SERPL-MCNC: 11.8 MG/DL (ref 8.4–10.2)
CHLORIDE SERPL-SCNC: 99 MMOL/L (ref 98–107)
CO2 SERPL-SCNC: 24 MMOL/L (ref 22–29)
DEPRECATED NEUTROPHILS # BLD AUTO: 11.2 10*3/UL (ref 2.1–6.9)
EGFRCR SERPLBLD CKD-EPI 2021: 46 ML/MIN (ref 60–?)
EOSINOPHIL # BLD AUTO: 0.1 10*3/UL (ref 0–0.4)
EOSINOPHIL NFR BLD AUTO: 0.7 % (ref 0–6)
ERYTHROCYTE [DISTWIDTH] IN CORD BLOOD: 22.8 % (ref 11.7–14.4)
GLOBULIN PLAS-MCNC: 4.8 G/DL (ref 2.3–3.5)
GLUCOSE SERPLBLD-MCNC: 117 MG/DL (ref 74–118)
HCT VFR BLD AUTO: 34.5 % (ref 38.2–49.6)
HGB BLD-MCNC: 11.6 G/DL (ref 14–18)
LYMPHOCYTES # BLD: 0.5 10*3/UL (ref 1–3.2)
LYMPHOCYTES NFR BLD AUTO: 3.3 % (ref 18–39.1)
LYMPHOCYTES NFR BLD MANUAL: 3 % (ref 19–48)
MCH RBC QN AUTO: 29.5 PG (ref 28–32)
MCHC RBC AUTO-ENTMCNC: 33.6 G/DL (ref 31–35)
MCV RBC AUTO: 87.8 FL (ref 81–99)
MONOCYTES # BLD AUTO: 1.4 10*3/UL (ref 0.2–0.8)
MONOCYTES NFR BLD AUTO: 9.5 % (ref 4.4–11.3)
MONOCYTES NFR BLD MANUAL: 14 % (ref 3.4–9)
MYELOCYTES NFR BLD MANUAL: 1 % (ref 0–0)
NEUTS BAND NFR BLD MANUAL: 4 %
NEUTS SEG NFR BLD AUTO: 78.3 % (ref 38.7–80)
NEUTS SEG NFR BLD MANUAL: 77 % (ref 40–74)
PLAT MORPH BLD: NORMAL
PLATELET # BLD AUTO: 161 X10E3/UL (ref 140–360)
PLATELET # BLD EST: ADEQUATE 10*3/UL
POTASSIUM SERPL-SCNC: 4.2 MMOL/L (ref 3.5–5.1)
RBC # BLD AUTO: 3.93 X10E6/UL (ref 4.3–5.7)
RBC MORPH BLD: NORMAL
SODIUM SERPL-SCNC: 137 MMOL/L (ref 136–145)

## 2018-06-10 RX ADMIN — HYDROMORPHONE HYDROCHLORIDE PRN MG: 1 INJECTION, SOLUTION INTRAMUSCULAR; INTRAVENOUS; SUBCUTANEOUS at 08:21

## 2018-06-10 RX ADMIN — HYDROMORPHONE HYDROCHLORIDE PRN MG: 1 INJECTION, SOLUTION INTRAMUSCULAR; INTRAVENOUS; SUBCUTANEOUS at 02:15

## 2018-06-10 RX ADMIN — SODIUM CHLORIDE SCH MLS/HR: 9 INJECTION, SOLUTION INTRAVENOUS at 13:48

## 2018-06-10 RX ADMIN — HYDROMORPHONE HYDROCHLORIDE PRN MG: 1 INJECTION, SOLUTION INTRAMUSCULAR; INTRAVENOUS; SUBCUTANEOUS at 19:45

## 2018-06-10 RX ADMIN — LISINOPRIL SCH MG: 10 TABLET ORAL at 08:21

## 2018-06-10 RX ADMIN — DEXTROSE, SODIUM CHLORIDE, AND POTASSIUM CHLORIDE SCH MLS/HR: 5; .9; .15 INJECTION INTRAVENOUS at 02:23

## 2018-06-10 RX ADMIN — LORAZEPAM PRN MG: 2 INJECTION INTRAMUSCULAR; INTRAVENOUS at 06:45

## 2018-06-10 RX ADMIN — LORAZEPAM PRN MG: 2 INJECTION INTRAMUSCULAR; INTRAVENOUS at 00:45

## 2018-06-10 RX ADMIN — METHOCARBAMOL SCH MG: 500 TABLET ORAL at 20:49

## 2018-06-10 RX ADMIN — DIPHENHYDRAMINE HYDROCHLORIDE PRN MG: 50 INJECTION INTRAMUSCULAR; INTRAVENOUS at 00:45

## 2018-06-10 RX ADMIN — HYDROMORPHONE HYDROCHLORIDE PRN MG: 1 INJECTION, SOLUTION INTRAMUSCULAR; INTRAVENOUS; SUBCUTANEOUS at 23:58

## 2018-06-10 RX ADMIN — DIPHENHYDRAMINE HYDROCHLORIDE PRN MG: 50 INJECTION INTRAMUSCULAR; INTRAVENOUS at 09:03

## 2018-06-10 RX ADMIN — HYDROMORPHONE HYDROCHLORIDE PRN MG: 1 INJECTION, SOLUTION INTRAMUSCULAR; INTRAVENOUS; SUBCUTANEOUS at 05:21

## 2018-06-10 RX ADMIN — METHOCARBAMOL SCH MG: 500 TABLET ORAL at 13:48

## 2018-06-10 RX ADMIN — HYDROMORPHONE HYDROCHLORIDE PRN MG: 1 INJECTION, SOLUTION INTRAMUSCULAR; INTRAVENOUS; SUBCUTANEOUS at 16:23

## 2018-06-10 RX ADMIN — METHOCARBAMOL SCH MG: 500 TABLET ORAL at 08:21

## 2018-06-10 RX ADMIN — DIPHENHYDRAMINE HYDROCHLORIDE PRN MG: 50 INJECTION INTRAMUSCULAR; INTRAVENOUS at 04:48

## 2018-06-10 RX ADMIN — SODIUM CHLORIDE SCH MLS/HR: 9 INJECTION, SOLUTION INTRAVENOUS at 20:04

## 2018-06-10 RX ADMIN — SODIUM CHLORIDE SCH MLS/HR: 9 INJECTION, SOLUTION INTRAVENOUS at 12:50

## 2018-06-10 RX ADMIN — LORAZEPAM PRN MG: 2 INJECTION INTRAMUSCULAR; INTRAVENOUS at 13:13

## 2018-06-10 RX ADMIN — LORAZEPAM PRN MG: 2 INJECTION INTRAMUSCULAR; INTRAVENOUS at 20:49

## 2018-06-10 RX ADMIN — DIPHENHYDRAMINE HYDROCHLORIDE PRN MG: 50 INJECTION INTRAMUSCULAR; INTRAVENOUS at 17:49

## 2018-06-10 RX ADMIN — HYDROMORPHONE HYDROCHLORIDE PRN MG: 1 INJECTION, SOLUTION INTRAMUSCULAR; INTRAVENOUS; SUBCUTANEOUS at 12:07

## 2018-06-10 RX ADMIN — DIPHENHYDRAMINE HYDROCHLORIDE PRN MG: 50 INJECTION INTRAMUSCULAR; INTRAVENOUS at 22:10

## 2018-06-10 RX ADMIN — DIPHENHYDRAMINE HYDROCHLORIDE PRN MG: 50 INJECTION INTRAMUSCULAR; INTRAVENOUS at 13:48

## 2018-06-11 VITALS — DIASTOLIC BLOOD PRESSURE: 71 MMHG | SYSTOLIC BLOOD PRESSURE: 99 MMHG

## 2018-06-11 VITALS — SYSTOLIC BLOOD PRESSURE: 153 MMHG | DIASTOLIC BLOOD PRESSURE: 81 MMHG

## 2018-06-11 VITALS — DIASTOLIC BLOOD PRESSURE: 54 MMHG | SYSTOLIC BLOOD PRESSURE: 74 MMHG

## 2018-06-11 VITALS — SYSTOLIC BLOOD PRESSURE: 89 MMHG | DIASTOLIC BLOOD PRESSURE: 56 MMHG

## 2018-06-11 VITALS — SYSTOLIC BLOOD PRESSURE: 89 MMHG | DIASTOLIC BLOOD PRESSURE: 72 MMHG

## 2018-06-11 VITALS — SYSTOLIC BLOOD PRESSURE: 105 MMHG | DIASTOLIC BLOOD PRESSURE: 15 MMHG

## 2018-06-11 VITALS — SYSTOLIC BLOOD PRESSURE: 92 MMHG | DIASTOLIC BLOOD PRESSURE: 79 MMHG

## 2018-06-11 VITALS — SYSTOLIC BLOOD PRESSURE: 90 MMHG | DIASTOLIC BLOOD PRESSURE: 54 MMHG

## 2018-06-11 VITALS — SYSTOLIC BLOOD PRESSURE: 88 MMHG | DIASTOLIC BLOOD PRESSURE: 67 MMHG

## 2018-06-11 VITALS — DIASTOLIC BLOOD PRESSURE: 87 MMHG | SYSTOLIC BLOOD PRESSURE: 126 MMHG

## 2018-06-11 VITALS — DIASTOLIC BLOOD PRESSURE: 80 MMHG | SYSTOLIC BLOOD PRESSURE: 137 MMHG

## 2018-06-11 VITALS — DIASTOLIC BLOOD PRESSURE: 76 MMHG | SYSTOLIC BLOOD PRESSURE: 117 MMHG

## 2018-06-11 VITALS — SYSTOLIC BLOOD PRESSURE: 97 MMHG | DIASTOLIC BLOOD PRESSURE: 75 MMHG

## 2018-06-11 VITALS — SYSTOLIC BLOOD PRESSURE: 105 MMHG | DIASTOLIC BLOOD PRESSURE: 65 MMHG

## 2018-06-11 VITALS — DIASTOLIC BLOOD PRESSURE: 77 MMHG | SYSTOLIC BLOOD PRESSURE: 153 MMHG

## 2018-06-11 VITALS — SYSTOLIC BLOOD PRESSURE: 88 MMHG | DIASTOLIC BLOOD PRESSURE: 66 MMHG

## 2018-06-11 VITALS — SYSTOLIC BLOOD PRESSURE: 95 MMHG | DIASTOLIC BLOOD PRESSURE: 63 MMHG

## 2018-06-11 VITALS — SYSTOLIC BLOOD PRESSURE: 106 MMHG | DIASTOLIC BLOOD PRESSURE: 87 MMHG

## 2018-06-11 VITALS — DIASTOLIC BLOOD PRESSURE: 83 MMHG | SYSTOLIC BLOOD PRESSURE: 141 MMHG

## 2018-06-11 VITALS — SYSTOLIC BLOOD PRESSURE: 74 MMHG | DIASTOLIC BLOOD PRESSURE: 52 MMHG

## 2018-06-11 VITALS — SYSTOLIC BLOOD PRESSURE: 85 MMHG | DIASTOLIC BLOOD PRESSURE: 70 MMHG

## 2018-06-11 VITALS — DIASTOLIC BLOOD PRESSURE: 60 MMHG | SYSTOLIC BLOOD PRESSURE: 97 MMHG

## 2018-06-11 VITALS — SYSTOLIC BLOOD PRESSURE: 122 MMHG | DIASTOLIC BLOOD PRESSURE: 77 MMHG

## 2018-06-11 VITALS — DIASTOLIC BLOOD PRESSURE: 68 MMHG | SYSTOLIC BLOOD PRESSURE: 90 MMHG

## 2018-06-11 VITALS — SYSTOLIC BLOOD PRESSURE: 119 MMHG | DIASTOLIC BLOOD PRESSURE: 66 MMHG

## 2018-06-11 VITALS — DIASTOLIC BLOOD PRESSURE: 42 MMHG | SYSTOLIC BLOOD PRESSURE: 91 MMHG

## 2018-06-11 VITALS — SYSTOLIC BLOOD PRESSURE: 79 MMHG | DIASTOLIC BLOOD PRESSURE: 54 MMHG

## 2018-06-11 VITALS — DIASTOLIC BLOOD PRESSURE: 66 MMHG | SYSTOLIC BLOOD PRESSURE: 85 MMHG

## 2018-06-11 VITALS — SYSTOLIC BLOOD PRESSURE: 72 MMHG | DIASTOLIC BLOOD PRESSURE: 46 MMHG

## 2018-06-11 VITALS — DIASTOLIC BLOOD PRESSURE: 81 MMHG | SYSTOLIC BLOOD PRESSURE: 136 MMHG

## 2018-06-11 VITALS — DIASTOLIC BLOOD PRESSURE: 62 MMHG | SYSTOLIC BLOOD PRESSURE: 100 MMHG

## 2018-06-11 VITALS — DIASTOLIC BLOOD PRESSURE: 57 MMHG | SYSTOLIC BLOOD PRESSURE: 86 MMHG

## 2018-06-11 VITALS — SYSTOLIC BLOOD PRESSURE: 157 MMHG | DIASTOLIC BLOOD PRESSURE: 101 MMHG

## 2018-06-11 VITALS — SYSTOLIC BLOOD PRESSURE: 119 MMHG | DIASTOLIC BLOOD PRESSURE: 79 MMHG

## 2018-06-11 VITALS — DIASTOLIC BLOOD PRESSURE: 68 MMHG | SYSTOLIC BLOOD PRESSURE: 89 MMHG

## 2018-06-11 VITALS — SYSTOLIC BLOOD PRESSURE: 144 MMHG | DIASTOLIC BLOOD PRESSURE: 79 MMHG

## 2018-06-11 VITALS — SYSTOLIC BLOOD PRESSURE: 84 MMHG | DIASTOLIC BLOOD PRESSURE: 47 MMHG

## 2018-06-11 VITALS — SYSTOLIC BLOOD PRESSURE: 127 MMHG | DIASTOLIC BLOOD PRESSURE: 74 MMHG

## 2018-06-11 VITALS — DIASTOLIC BLOOD PRESSURE: 85 MMHG | SYSTOLIC BLOOD PRESSURE: 122 MMHG

## 2018-06-11 VITALS — SYSTOLIC BLOOD PRESSURE: 85 MMHG | DIASTOLIC BLOOD PRESSURE: 52 MMHG

## 2018-06-11 VITALS — SYSTOLIC BLOOD PRESSURE: 126 MMHG | DIASTOLIC BLOOD PRESSURE: 87 MMHG

## 2018-06-11 VITALS — DIASTOLIC BLOOD PRESSURE: 49 MMHG | SYSTOLIC BLOOD PRESSURE: 76 MMHG

## 2018-06-11 VITALS — DIASTOLIC BLOOD PRESSURE: 90 MMHG | SYSTOLIC BLOOD PRESSURE: 109 MMHG

## 2018-06-11 VITALS — SYSTOLIC BLOOD PRESSURE: 76 MMHG | DIASTOLIC BLOOD PRESSURE: 48 MMHG

## 2018-06-11 VITALS — DIASTOLIC BLOOD PRESSURE: 67 MMHG | SYSTOLIC BLOOD PRESSURE: 92 MMHG

## 2018-06-11 VITALS — DIASTOLIC BLOOD PRESSURE: 63 MMHG | SYSTOLIC BLOOD PRESSURE: 88 MMHG

## 2018-06-11 VITALS — DIASTOLIC BLOOD PRESSURE: 55 MMHG | SYSTOLIC BLOOD PRESSURE: 86 MMHG

## 2018-06-11 VITALS — DIASTOLIC BLOOD PRESSURE: 51 MMHG | SYSTOLIC BLOOD PRESSURE: 75 MMHG

## 2018-06-11 VITALS — SYSTOLIC BLOOD PRESSURE: 80 MMHG | DIASTOLIC BLOOD PRESSURE: 63 MMHG

## 2018-06-11 VITALS — DIASTOLIC BLOOD PRESSURE: 66 MMHG | SYSTOLIC BLOOD PRESSURE: 95 MMHG

## 2018-06-11 LAB
ALBUMIN SERPL-MCNC: 2.7 G/DL (ref 3.5–5)
ALP SERPL-CCNC: 408 IU/L (ref 40–150)
ALT SERPL-CCNC: 22 IU/L (ref 0–55)
ANION GAP SERPL CALC-SCNC: 18.1 MMOL/L (ref 8–16)
ANISOCYTOSIS BLD QL SMEAR: (no result)
BASE EXCESS BLDA CALC-SCNC: -3 MMOL/L (ref -2–3)
BASOPHILS # BLD AUTO: 0 10*3/UL (ref 0–0.1)
BASOPHILS NFR BLD AUTO: 0.3 % (ref 0–1)
BILIRUB CONJ SERPL-MCNC: 1.2 MG/DL (ref 0–0.5)
BUN SERPL-MCNC: 40 MG/DL (ref 7–26)
BUN/CREAT SERPL: 23 (ref 6–25)
CALCIUM SERPL-MCNC: 12 MG/DL (ref 8.4–10.2)
CHLORIDE SERPL-SCNC: 94 MMOL/L (ref 98–107)
CO2 SERPL-SCNC: 25 MMOL/L (ref 22–29)
DEPRECATED NEUTROPHILS # BLD AUTO: 11.3 10*3/UL (ref 2.1–6.9)
EGFRCR SERPLBLD CKD-EPI 2021: 41 ML/MIN (ref 60–?)
EOSINOPHIL # BLD AUTO: 0.1 10*3/UL (ref 0–0.4)
EOSINOPHIL NFR BLD AUTO: 0.6 % (ref 0–6)
ERYTHROCYTE [DISTWIDTH] IN CORD BLOOD: 22.5 % (ref 11.7–14.4)
GLUCOSE SERPLBLD-MCNC: 91 MG/DL (ref 74–118)
HCO3 BLDA-SCNC: 22 MMOL/L (ref 23–28)
HCT VFR BLD AUTO: 27 % (ref 38.2–49.6)
HCT VFR BLD AUTO: 36 % (ref 38.2–49.6)
HGB BLD-MCNC: 11.8 G/DL (ref 14–18)
HGB BLD-MCNC: 9 G/DL (ref 14–18)
LYMPHOCYTES # BLD: 0.3 10*3/UL (ref 1–3.2)
LYMPHOCYTES NFR BLD AUTO: 2.4 % (ref 18–39.1)
LYMPHOCYTES NFR BLD MANUAL: 2 % (ref 19–48)
MCH RBC QN AUTO: 29.8 PG (ref 28–32)
MCHC RBC AUTO-ENTMCNC: 32.8 G/DL (ref 31–35)
MCV RBC AUTO: 90.9 FL (ref 81–99)
METAMYELOCYTES NFR BLD MANUAL: 2 % (ref 0–0)
MONOCYTES # BLD AUTO: 1 10*3/UL (ref 0.2–0.8)
MONOCYTES NFR BLD AUTO: 7.2 % (ref 4.4–11.3)
MONOCYTES NFR BLD MANUAL: 3 % (ref 3.4–9)
NEUTS BAND NFR BLD MANUAL: 3 %
NEUTS SEG NFR BLD AUTO: 83.6 % (ref 38.7–80)
NEUTS SEG NFR BLD MANUAL: 90 % (ref 40–74)
PCO2 BLDA: 37 MMHG (ref 41–51)
PCO2 BLDA: 64 MMHG (ref 80–105)
PH BLDA: 7.39 [PH] (ref 7.31–7.41)
PLAT MORPH BLD: NORMAL
PLATELET # BLD AUTO: 167 X10E3/UL (ref 140–360)
PLATELET # BLD EST: ADEQUATE 10*3/UL
POIKILOCYTOSIS BLD QL SMEAR: SLIGHT
POLYCHROMASIA BLD QL SMEAR: (no result)
POTASSIUM SERPL-SCNC: 4.1 MMOL/L (ref 3.5–5.1)
RBC # BLD AUTO: 3.96 X10E6/UL (ref 4.3–5.7)
RBC MORPH BLD: (no result)
SAO2 % BLDA: 92 % (ref 95–98)
SODIUM SERPL-SCNC: 133 MMOL/L (ref 136–145)

## 2018-06-11 PROCEDURE — 0D1B0Z4 BYPASS ILEUM TO CUTANEOUS, OPEN APPROACH: ICD-10-PCS | Performed by: SURGERY

## 2018-06-11 PROCEDURE — 5A1955Z RESPIRATORY VENTILATION, GREATER THAN 96 CONSECUTIVE HOURS: ICD-10-PCS | Performed by: SURGERY

## 2018-06-11 PROCEDURE — 0DB80ZX EXCISION OF SMALL INTESTINE, OPEN APPROACH, DIAGNOSTIC: ICD-10-PCS | Performed by: SURGERY

## 2018-06-11 RX ADMIN — HYDROMORPHONE HYDROCHLORIDE PRN MG: 1 INJECTION, SOLUTION INTRAMUSCULAR; INTRAVENOUS; SUBCUTANEOUS at 09:26

## 2018-06-11 RX ADMIN — METHOCARBAMOL SCH MG: 500 TABLET ORAL at 09:00

## 2018-06-11 RX ADMIN — LORAZEPAM PRN MG: 2 INJECTION INTRAMUSCULAR; INTRAVENOUS at 04:02

## 2018-06-11 RX ADMIN — MIDAZOLAM HYDROCHLORIDE SCH MLS/HR: 5 INJECTION INTRAMUSCULAR; INTRAVENOUS at 22:15

## 2018-06-11 RX ADMIN — MIDAZOLAM HYDROCHLORIDE SCH MLS/HR: 5 INJECTION INTRAMUSCULAR; INTRAVENOUS at 19:50

## 2018-06-11 RX ADMIN — Medication SCH MLS/HR: at 21:40

## 2018-06-11 RX ADMIN — SODIUM CHLORIDE SCH MLS/HR: 9 INJECTION, SOLUTION INTRAVENOUS at 09:26

## 2018-06-11 RX ADMIN — HYDROMORPHONE HYDROCHLORIDE PRN MG: 1 INJECTION, SOLUTION INTRAMUSCULAR; INTRAVENOUS; SUBCUTANEOUS at 06:12

## 2018-06-11 RX ADMIN — METRONIDAZOLE SCH MLS/HR: 500 INJECTION, SOLUTION INTRAVENOUS at 23:00

## 2018-06-11 RX ADMIN — SODIUM CHLORIDE SCH MLS/HR: 900 INJECTION, SOLUTION INTRAVENOUS at 16:15

## 2018-06-11 RX ADMIN — SODIUM CHLORIDE SCH ML: 900 IRRIGANT IRRIGATION at 20:05

## 2018-06-11 RX ADMIN — SODIUM CHLORIDE SCH MLS/HR: 9 INJECTION, SOLUTION INTRAVENOUS at 23:00

## 2018-06-11 RX ADMIN — SODIUM CHLORIDE SCH MLS/HR: 900 INJECTION, SOLUTION INTRAVENOUS at 17:15

## 2018-06-11 RX ADMIN — LORAZEPAM PRN MG: 2 INJECTION INTRAMUSCULAR; INTRAVENOUS at 08:11

## 2018-06-11 RX ADMIN — CEFOXITIN SCH GM: 1 INJECTION, POWDER, FOR SOLUTION INTRAVENOUS at 23:00

## 2018-06-11 RX ADMIN — LORAZEPAM PRN MG: 2 INJECTION INTRAMUSCULAR; INTRAVENOUS at 17:22

## 2018-06-11 RX ADMIN — SODIUM CHLORIDE SCH ML: 900 IRRIGANT IRRIGATION at 23:00

## 2018-06-11 RX ADMIN — SODIUM CHLORIDE SCH MLS/HR: 9 INJECTION, SOLUTION INTRAVENOUS at 18:05

## 2018-06-11 RX ADMIN — SODIUM CHLORIDE SCH MG: 900 INJECTION INTRAVENOUS at 17:22

## 2018-06-11 RX ADMIN — SODIUM CHLORIDE SCH MLS/HR: 9 INJECTION, SOLUTION INTRAVENOUS at 20:05

## 2018-06-11 RX ADMIN — SODIUM CHLORIDE SCH MLS/HR: 9 INJECTION, SOLUTION INTRAVENOUS at 02:41

## 2018-06-11 RX ADMIN — SODIUM CHLORIDE SCH MLS/HR: 900 INJECTION, SOLUTION INTRAVENOUS at 16:30

## 2018-06-11 RX ADMIN — CEFOXITIN SCH GM: 1 INJECTION, POWDER, FOR SOLUTION INTRAVENOUS at 18:05

## 2018-06-11 RX ADMIN — SODIUM CHLORIDE SCH MLS/HR: 9 INJECTION, SOLUTION INTRAVENOUS at 21:35

## 2018-06-11 RX ADMIN — SODIUM CHLORIDE SCH MLS/HR: 900 INJECTION, SOLUTION INTRAVENOUS at 17:00

## 2018-06-11 RX ADMIN — SODIUM CHLORIDE SCH ML: 900 IRRIGANT IRRIGATION at 17:22

## 2018-06-11 RX ADMIN — HYDROMORPHONE HYDROCHLORIDE PRN MG: 1 INJECTION, SOLUTION INTRAMUSCULAR; INTRAVENOUS; SUBCUTANEOUS at 03:12

## 2018-06-11 RX ADMIN — PROPOFOL PRN MLS/HR: 10 INJECTION, EMULSION INTRAVENOUS at 22:05

## 2018-06-11 RX ADMIN — DIPHENHYDRAMINE HYDROCHLORIDE PRN MG: 50 INJECTION INTRAMUSCULAR; INTRAVENOUS at 02:41

## 2018-06-11 RX ADMIN — METHOCARBAMOL SCH MG: 500 TABLET ORAL at 14:23

## 2018-06-11 RX ADMIN — LISINOPRIL SCH MG: 10 TABLET ORAL at 09:00

## 2018-06-11 RX ADMIN — HYDROMORPHONE HYDROCHLORIDE PRN MG: 1 INJECTION, SOLUTION INTRAMUSCULAR; INTRAVENOUS; SUBCUTANEOUS at 03:46

## 2018-06-11 NOTE — DIAGNOSTIC IMAGING REPORT
Non-tunneled Central Venous Catheter Placement 6/11/2018



Pre-Procedure Diagnosis: Multiorgan failure

Post-procedure Diagnosis:Multiorgan failure



: KIN Domínguez

Assistant: None

Sedation: None. 1% lidocaine local anesthesia.



Estimate blood loss: <5 mL Blood administered: None

Complications: None

Implants/Grafts: 16 cm 7-Italian 3 lumen CVC

Specimen: None





Procedure:

Informed consent was obtained and the patient positioned supine in the ICU. A

timeout was performed, followed by preliminary ultrasound of the right internal

jugular vein (see findings below). The right neck was prepped and draped in

standard fashion.



Using real-time ultrasound guidance a 18 gauge vascular needle was used to

access the  right internal jugular vein. An image was stored in the electronic

medical record. A wire was advanced while monitoring the patient's cardiac

rhythm and the needle exchanged for a non-tunneled central venous catheter

using Seldinger technique.



At the end of the procedure the catheter was flushed, secured to the skin and a

sterile dressing applied. The patient tolerated the procedure well and without

immediate complication.



Findings:

Patent right internal jugular vein as demonstrated by normal ultrasound

compressibility.



Impression:

Successful placement of a non-tunneled right internal jugular central venous

catheter using ultrasound guidance.







This report was generated with voice-recognition technology. Errors in

transcription can occur. Please interpret accordingly and contact a radiologist

if there are any questions regarding the report.



Signed by: Dr. Esteban Domínguez M.D. on 6/11/2018 9:07 PM

## 2018-06-11 NOTE — DIAGNOSTIC IMAGING REPORT
EXAMINATION:  CHEST SINGLE (PORTABLE)    



INDICATION:      

\S\intubated

\S\[id number]

\S\1919



COMPARISON:  6/1/2018 and 6/9/2018

     

FINDINGS:  AP view   



TUBES and LINES:  Left chest wall port with tip overlying the SVC. Endotracheal

tube tip projects approximately 5.5 cm above the anna. NG/OG-tube extends

below the diaphragm out of the field of view.



LUNGS:  Lungs are well inflated. Patchy opacities throughout the right lung and

left lung base.



PLEURA: Hazy bibasilar opacities likely representing layering effusions. No

pneumothorax..



HEART AND MEDIASTINUM:  The cardiomediastinal silhouette is unremarkable.    



BONES AND SOFT TISSUES:  No acute osseous lesion.  Soft tissues are

unremarkable.



UPPER ABDOMEN: No free air under the diaphragm.    



IMPRESSION: 

Lines and tubes as above.

Opacities throughout the right lung and left lung base with small bilateral

pleural effusions. Given the abrupt change from 6/9/2018, findings may reflect

asymmetric pulmonary edema or hemorrhage. Multifocal pneumonia remains in the

differential. 



Signed by: DR. Ascencion Enriquez MD on 6/11/2018 7:46 PM

## 2018-06-11 NOTE — DIAGNOSTIC IMAGING REPORT
Non-tunneled Central Venous Catheter Placement 6/11/2018



Pre-Procedure Diagnosis: Multiorgan failure

Post-procedure Diagnosis:Multiorgan failure



: KIN Domínguez

Assistant: None

Sedation: None. 1% lidocaine local anesthesia.



Estimate blood loss: <5 mL Blood administered: None

Complications: None

Implants/Grafts: 16 cm 7-Uzbek 3 lumen CVC

Specimen: None





Procedure:

Informed consent was obtained and the patient positioned supine in the ICU. A

timeout was performed, followed by preliminary ultrasound of the right internal

jugular vein (see findings below). The right neck was prepped and draped in

standard fashion.



Using real-time ultrasound guidance a 18 gauge vascular needle was used to

access the  right internal jugular vein. An image was stored in the electronic

medical record. A wire was advanced while monitoring the patient's cardiac

rhythm and the needle exchanged for a non-tunneled central venous catheter

using Seldinger technique.



At the end of the procedure the catheter was flushed, secured to the skin and a

sterile dressing applied. The patient tolerated the procedure well and without

immediate complication.



Findings:

Patent right internal jugular vein as demonstrated by normal ultrasound

compressibility.



Impression:

Successful placement of a non-tunneled right internal jugular central venous

catheter using ultrasound guidance.







This report was generated with voice-recognition technology. Errors in

transcription can occur. Please interpret accordingly and contact a radiologist

if there are any questions regarding the report.



Signed by: Dr. Esteban Domínguez M.D. on 6/11/2018 9:07 PM

## 2018-06-11 NOTE — CONSULTATION
DATE OF CONSULTATION:  June 11, 2018 



PULMONARY CONSULTATION



REASON FOR CONSULTATION:  ICU management.  



HPI:  Mr. Ruiz is a 47-year-old male who initially presented on June 1, 2018, with the complaint of nausea, vomiting and found to be a small-bowel 

obstruction.  Patient has a history of ulcerative colitis.  The patient is 

known to Dr. Hardwick since January of 2018.  He had a high-grade adenoma 

in 2014.  He was suggested a total colectomy.  However, he did not undergo 

colectomy.  In January of 2018, the patient was found to have 

intraperitoneal mets.  He is on chemotherapy.  Patient underwent 

exploratory laparotomy and small-bowel resection.  Bypass ileostomy was 

done.  Patient was found to have peritoneal carcinomatosis.  

Postoperatively, the patient is intubated, tachycardic.  He is on a 

mechanical ventilator with FIO2 of 70% and PEEP of zero.



REVIEW OF SYSTEMS:  Unable to elicit any as the patient is intubated and 

sedated. 



PAST MEDICAL HISTORY:  Ulcerative colitis.



PAST SURGICAL HISTORY:  None.



FAMILY HISTORY AND SOCIAL HISTORY:  Does not smoke.  Does not drink.

 

PHYSICAL EXAMINATION

VITAL SIGNS:  Temperature 97.6, pulse of 127, blood pressure 119/79, 

respiratory rate of 40. He is on mechanical ventilator with FIO2 of 70%, 

PEEP of 8 and tidal volume 480.   

HEENT:  Head is atraumatic and normocephalic. 

NECK:  Supple.  The patient is intubated. 

CHEST:  Crackles.  Chest with decreased air entry bilaterally.  

HEART:  S1 and S2 audible. 

ABDOMEN:  Soft. 

EXTREMITIES:  No clubbing, cyanosis or edema. 

NEUROLOGIC:  He is sedated and intubated.  



LABS:  Sodium 133, potassium 4.1, chloride 94, BUN 40, creatinine 1.7 and 

was 1.6 yesterday.  White count of 13,000, hemoglobin 11.8 and platelets 

167,000.  Total bilirubin is high.  INR is 1.05.  



ASSESSMENT AND PLAN:  Mr. Ruiz is a 47-year-old male who underwent 

exploratory laparotomy and found to have peritoneal carcinomatosis.  

Patient underwent small bowel resection with ileostomy.  Currently, 

intubated and sedated.



CURRENT PROBLEMS

1.  Acute hypoxic respiratory failure postoperatively.

2.  Metastatic colon cancer.

3.  Acute kidney injury.

4.  Circulatory shock.



PLAN

1.  I will start the patient on IV fentanyl and Versed.  Keep the patient 

on ventilator.  Ventilator settings reviewed and changed.  The FIO2 is down 

to 70%.  Increase the PEEP to 8.  

2.  Central line placement.  

3.  The patient is on cefoxitin and will be continued.  



4.  ABG reviewed.  Ventilator settings changed.  Critical care time spent 

45 minutes. 



 





DD:  06/11/2018 19:12

DT:  06/11/2018 20:18

Job#:  [id number]

## 2018-06-11 NOTE — DIAGNOSTIC IMAGING REPORT
EXAM: CHEST SINGLE (PORTABLE), AP 1 view

INDICATION: Central line placement

COMPARISON: AP view the chest June 11, 2018 at 1918 hours



FINDINGS:

LINES/TUBES: Interval placement of right internal jugular vein central line

with tip terminating at the expected location of the proximal superior vena

cava. Stable position of left subclavian chest port and nasal/orogastric tube.

The endotracheal tube terminates 6 cm above the anna.



LUNGS: Stable bilateral airspace opacities. 



PLEURA: Suspected small bilateral pleural effusions.



HEART AND MEDIASTINUM: Stable appearance given rotation.



BONES AND SOFT TISSUES: No acute findings. 



IMPRESSION:

Interval placement of right internal jugular vein central line with tip at the

expected location of the proximal superior vena cava. No pneumothorax.







Signed by: Dr. Reny Gil M.D. on 6/11/2018 10:01 PM

## 2018-06-12 VITALS — DIASTOLIC BLOOD PRESSURE: 76 MMHG | SYSTOLIC BLOOD PRESSURE: 103 MMHG

## 2018-06-12 VITALS — DIASTOLIC BLOOD PRESSURE: 72 MMHG | SYSTOLIC BLOOD PRESSURE: 103 MMHG

## 2018-06-12 VITALS — SYSTOLIC BLOOD PRESSURE: 106 MMHG | DIASTOLIC BLOOD PRESSURE: 62 MMHG

## 2018-06-12 VITALS — DIASTOLIC BLOOD PRESSURE: 56 MMHG | SYSTOLIC BLOOD PRESSURE: 96 MMHG

## 2018-06-12 VITALS — DIASTOLIC BLOOD PRESSURE: 55 MMHG | SYSTOLIC BLOOD PRESSURE: 94 MMHG

## 2018-06-12 VITALS — SYSTOLIC BLOOD PRESSURE: 98 MMHG | DIASTOLIC BLOOD PRESSURE: 70 MMHG

## 2018-06-12 VITALS — DIASTOLIC BLOOD PRESSURE: 52 MMHG | SYSTOLIC BLOOD PRESSURE: 87 MMHG

## 2018-06-12 VITALS — SYSTOLIC BLOOD PRESSURE: 109 MMHG | DIASTOLIC BLOOD PRESSURE: 66 MMHG

## 2018-06-12 VITALS — DIASTOLIC BLOOD PRESSURE: 55 MMHG | SYSTOLIC BLOOD PRESSURE: 95 MMHG

## 2018-06-12 VITALS — DIASTOLIC BLOOD PRESSURE: 49 MMHG | SYSTOLIC BLOOD PRESSURE: 85 MMHG

## 2018-06-12 VITALS — DIASTOLIC BLOOD PRESSURE: 63 MMHG | SYSTOLIC BLOOD PRESSURE: 101 MMHG

## 2018-06-12 VITALS — DIASTOLIC BLOOD PRESSURE: 52 MMHG | SYSTOLIC BLOOD PRESSURE: 93 MMHG

## 2018-06-12 VITALS — SYSTOLIC BLOOD PRESSURE: 103 MMHG | DIASTOLIC BLOOD PRESSURE: 58 MMHG

## 2018-06-12 VITALS — DIASTOLIC BLOOD PRESSURE: 65 MMHG | SYSTOLIC BLOOD PRESSURE: 111 MMHG

## 2018-06-12 VITALS — SYSTOLIC BLOOD PRESSURE: 97 MMHG | DIASTOLIC BLOOD PRESSURE: 55 MMHG

## 2018-06-12 VITALS — SYSTOLIC BLOOD PRESSURE: 82 MMHG | DIASTOLIC BLOOD PRESSURE: 52 MMHG

## 2018-06-12 VITALS — DIASTOLIC BLOOD PRESSURE: 52 MMHG | SYSTOLIC BLOOD PRESSURE: 91 MMHG

## 2018-06-12 VITALS — SYSTOLIC BLOOD PRESSURE: 101 MMHG | DIASTOLIC BLOOD PRESSURE: 56 MMHG

## 2018-06-12 VITALS — SYSTOLIC BLOOD PRESSURE: 112 MMHG | DIASTOLIC BLOOD PRESSURE: 62 MMHG

## 2018-06-12 VITALS — DIASTOLIC BLOOD PRESSURE: 58 MMHG | SYSTOLIC BLOOD PRESSURE: 106 MMHG

## 2018-06-12 VITALS — SYSTOLIC BLOOD PRESSURE: 114 MMHG | DIASTOLIC BLOOD PRESSURE: 73 MMHG

## 2018-06-12 VITALS — DIASTOLIC BLOOD PRESSURE: 55 MMHG | SYSTOLIC BLOOD PRESSURE: 98 MMHG

## 2018-06-12 VITALS — SYSTOLIC BLOOD PRESSURE: 101 MMHG | DIASTOLIC BLOOD PRESSURE: 69 MMHG

## 2018-06-12 VITALS — SYSTOLIC BLOOD PRESSURE: 103 MMHG | DIASTOLIC BLOOD PRESSURE: 61 MMHG

## 2018-06-12 VITALS — SYSTOLIC BLOOD PRESSURE: 99 MMHG | DIASTOLIC BLOOD PRESSURE: 57 MMHG

## 2018-06-12 VITALS — DIASTOLIC BLOOD PRESSURE: 52 MMHG | SYSTOLIC BLOOD PRESSURE: 85 MMHG

## 2018-06-12 VITALS — SYSTOLIC BLOOD PRESSURE: 113 MMHG | DIASTOLIC BLOOD PRESSURE: 80 MMHG

## 2018-06-12 VITALS — DIASTOLIC BLOOD PRESSURE: 65 MMHG | SYSTOLIC BLOOD PRESSURE: 112 MMHG

## 2018-06-12 VITALS — SYSTOLIC BLOOD PRESSURE: 97 MMHG | DIASTOLIC BLOOD PRESSURE: 58 MMHG

## 2018-06-12 VITALS — DIASTOLIC BLOOD PRESSURE: 55 MMHG | SYSTOLIC BLOOD PRESSURE: 97 MMHG

## 2018-06-12 VITALS — SYSTOLIC BLOOD PRESSURE: 103 MMHG | DIASTOLIC BLOOD PRESSURE: 73 MMHG

## 2018-06-12 VITALS — SYSTOLIC BLOOD PRESSURE: 100 MMHG | DIASTOLIC BLOOD PRESSURE: 59 MMHG

## 2018-06-12 VITALS — DIASTOLIC BLOOD PRESSURE: 69 MMHG | SYSTOLIC BLOOD PRESSURE: 111 MMHG

## 2018-06-12 VITALS — DIASTOLIC BLOOD PRESSURE: 75 MMHG | SYSTOLIC BLOOD PRESSURE: 106 MMHG

## 2018-06-12 VITALS — SYSTOLIC BLOOD PRESSURE: 97 MMHG | DIASTOLIC BLOOD PRESSURE: 57 MMHG

## 2018-06-12 VITALS — DIASTOLIC BLOOD PRESSURE: 53 MMHG | SYSTOLIC BLOOD PRESSURE: 92 MMHG

## 2018-06-12 VITALS — DIASTOLIC BLOOD PRESSURE: 55 MMHG | SYSTOLIC BLOOD PRESSURE: 91 MMHG

## 2018-06-12 VITALS — DIASTOLIC BLOOD PRESSURE: 53 MMHG | SYSTOLIC BLOOD PRESSURE: 90 MMHG

## 2018-06-12 VITALS — DIASTOLIC BLOOD PRESSURE: 58 MMHG | SYSTOLIC BLOOD PRESSURE: 100 MMHG

## 2018-06-12 VITALS — SYSTOLIC BLOOD PRESSURE: 90 MMHG | DIASTOLIC BLOOD PRESSURE: 53 MMHG

## 2018-06-12 VITALS — DIASTOLIC BLOOD PRESSURE: 56 MMHG | SYSTOLIC BLOOD PRESSURE: 98 MMHG

## 2018-06-12 VITALS — SYSTOLIC BLOOD PRESSURE: 107 MMHG | DIASTOLIC BLOOD PRESSURE: 58 MMHG

## 2018-06-12 VITALS — SYSTOLIC BLOOD PRESSURE: 95 MMHG | DIASTOLIC BLOOD PRESSURE: 53 MMHG

## 2018-06-12 VITALS — DIASTOLIC BLOOD PRESSURE: 68 MMHG | SYSTOLIC BLOOD PRESSURE: 105 MMHG

## 2018-06-12 VITALS — SYSTOLIC BLOOD PRESSURE: 252 MMHG | DIASTOLIC BLOOD PRESSURE: 248 MMHG

## 2018-06-12 VITALS — DIASTOLIC BLOOD PRESSURE: 56 MMHG | SYSTOLIC BLOOD PRESSURE: 99 MMHG

## 2018-06-12 VITALS — SYSTOLIC BLOOD PRESSURE: 141 MMHG | DIASTOLIC BLOOD PRESSURE: 34 MMHG

## 2018-06-12 VITALS — SYSTOLIC BLOOD PRESSURE: 98 MMHG | DIASTOLIC BLOOD PRESSURE: 55 MMHG

## 2018-06-12 VITALS — DIASTOLIC BLOOD PRESSURE: 72 MMHG | SYSTOLIC BLOOD PRESSURE: 100 MMHG

## 2018-06-12 VITALS — SYSTOLIC BLOOD PRESSURE: 95 MMHG | DIASTOLIC BLOOD PRESSURE: 56 MMHG

## 2018-06-12 VITALS — DIASTOLIC BLOOD PRESSURE: 60 MMHG | SYSTOLIC BLOOD PRESSURE: 102 MMHG

## 2018-06-12 VITALS — SYSTOLIC BLOOD PRESSURE: 104 MMHG | DIASTOLIC BLOOD PRESSURE: 72 MMHG

## 2018-06-12 VITALS — SYSTOLIC BLOOD PRESSURE: 106 MMHG | DIASTOLIC BLOOD PRESSURE: 68 MMHG

## 2018-06-12 VITALS — SYSTOLIC BLOOD PRESSURE: 116 MMHG | DIASTOLIC BLOOD PRESSURE: 74 MMHG

## 2018-06-12 VITALS — DIASTOLIC BLOOD PRESSURE: 71 MMHG | SYSTOLIC BLOOD PRESSURE: 119 MMHG

## 2018-06-12 VITALS — DIASTOLIC BLOOD PRESSURE: 67 MMHG | SYSTOLIC BLOOD PRESSURE: 104 MMHG

## 2018-06-12 VITALS — DIASTOLIC BLOOD PRESSURE: 74 MMHG | SYSTOLIC BLOOD PRESSURE: 109 MMHG

## 2018-06-12 VITALS — DIASTOLIC BLOOD PRESSURE: 53 MMHG | SYSTOLIC BLOOD PRESSURE: 93 MMHG

## 2018-06-12 VITALS — DIASTOLIC BLOOD PRESSURE: 72 MMHG | SYSTOLIC BLOOD PRESSURE: 108 MMHG

## 2018-06-12 VITALS — DIASTOLIC BLOOD PRESSURE: 58 MMHG | SYSTOLIC BLOOD PRESSURE: 102 MMHG

## 2018-06-12 VITALS — SYSTOLIC BLOOD PRESSURE: 98 MMHG | DIASTOLIC BLOOD PRESSURE: 56 MMHG

## 2018-06-12 VITALS — SYSTOLIC BLOOD PRESSURE: 113 MMHG | DIASTOLIC BLOOD PRESSURE: 74 MMHG

## 2018-06-12 VITALS — SYSTOLIC BLOOD PRESSURE: 91 MMHG | DIASTOLIC BLOOD PRESSURE: 54 MMHG

## 2018-06-12 VITALS — DIASTOLIC BLOOD PRESSURE: 74 MMHG | SYSTOLIC BLOOD PRESSURE: 108 MMHG

## 2018-06-12 VITALS — SYSTOLIC BLOOD PRESSURE: 103 MMHG | DIASTOLIC BLOOD PRESSURE: 63 MMHG

## 2018-06-12 VITALS — DIASTOLIC BLOOD PRESSURE: 65 MMHG | SYSTOLIC BLOOD PRESSURE: 110 MMHG

## 2018-06-12 VITALS — SYSTOLIC BLOOD PRESSURE: 90 MMHG | DIASTOLIC BLOOD PRESSURE: 55 MMHG

## 2018-06-12 VITALS — SYSTOLIC BLOOD PRESSURE: 98 MMHG | DIASTOLIC BLOOD PRESSURE: 54 MMHG

## 2018-06-12 VITALS — DIASTOLIC BLOOD PRESSURE: 51 MMHG | SYSTOLIC BLOOD PRESSURE: 87 MMHG

## 2018-06-12 VITALS — SYSTOLIC BLOOD PRESSURE: 114 MMHG | DIASTOLIC BLOOD PRESSURE: 77 MMHG

## 2018-06-12 VITALS — DIASTOLIC BLOOD PRESSURE: 61 MMHG | SYSTOLIC BLOOD PRESSURE: 106 MMHG

## 2018-06-12 VITALS — SYSTOLIC BLOOD PRESSURE: 94 MMHG | DIASTOLIC BLOOD PRESSURE: 53 MMHG

## 2018-06-12 VITALS — DIASTOLIC BLOOD PRESSURE: 48 MMHG | SYSTOLIC BLOOD PRESSURE: 83 MMHG

## 2018-06-12 VITALS — DIASTOLIC BLOOD PRESSURE: 51 MMHG | SYSTOLIC BLOOD PRESSURE: 91 MMHG

## 2018-06-12 VITALS — DIASTOLIC BLOOD PRESSURE: 77 MMHG | SYSTOLIC BLOOD PRESSURE: 112 MMHG

## 2018-06-12 VITALS — SYSTOLIC BLOOD PRESSURE: 93 MMHG | DIASTOLIC BLOOD PRESSURE: 52 MMHG

## 2018-06-12 VITALS — SYSTOLIC BLOOD PRESSURE: 94 MMHG | DIASTOLIC BLOOD PRESSURE: 54 MMHG

## 2018-06-12 VITALS — DIASTOLIC BLOOD PRESSURE: 53 MMHG | SYSTOLIC BLOOD PRESSURE: 91 MMHG

## 2018-06-12 VITALS — DIASTOLIC BLOOD PRESSURE: 59 MMHG | SYSTOLIC BLOOD PRESSURE: 107 MMHG

## 2018-06-12 VITALS — SYSTOLIC BLOOD PRESSURE: 105 MMHG | DIASTOLIC BLOOD PRESSURE: 75 MMHG

## 2018-06-12 VITALS — DIASTOLIC BLOOD PRESSURE: 50 MMHG | SYSTOLIC BLOOD PRESSURE: 87 MMHG

## 2018-06-12 VITALS — SYSTOLIC BLOOD PRESSURE: 105 MMHG | DIASTOLIC BLOOD PRESSURE: 60 MMHG

## 2018-06-12 VITALS — DIASTOLIC BLOOD PRESSURE: 65 MMHG | SYSTOLIC BLOOD PRESSURE: 113 MMHG

## 2018-06-12 VITALS — DIASTOLIC BLOOD PRESSURE: 51 MMHG | SYSTOLIC BLOOD PRESSURE: 82 MMHG

## 2018-06-12 VITALS — SYSTOLIC BLOOD PRESSURE: 97 MMHG | DIASTOLIC BLOOD PRESSURE: 56 MMHG

## 2018-06-12 VITALS — DIASTOLIC BLOOD PRESSURE: 57 MMHG | SYSTOLIC BLOOD PRESSURE: 98 MMHG

## 2018-06-12 VITALS — SYSTOLIC BLOOD PRESSURE: 78 MMHG | DIASTOLIC BLOOD PRESSURE: 45 MMHG

## 2018-06-12 VITALS — DIASTOLIC BLOOD PRESSURE: 57 MMHG | SYSTOLIC BLOOD PRESSURE: 100 MMHG

## 2018-06-12 VITALS — DIASTOLIC BLOOD PRESSURE: 70 MMHG | SYSTOLIC BLOOD PRESSURE: 107 MMHG

## 2018-06-12 VITALS — DIASTOLIC BLOOD PRESSURE: 71 MMHG | SYSTOLIC BLOOD PRESSURE: 109 MMHG

## 2018-06-12 VITALS — SYSTOLIC BLOOD PRESSURE: 107 MMHG | DIASTOLIC BLOOD PRESSURE: 71 MMHG

## 2018-06-12 VITALS — DIASTOLIC BLOOD PRESSURE: 54 MMHG | SYSTOLIC BLOOD PRESSURE: 92 MMHG

## 2018-06-12 VITALS — SYSTOLIC BLOOD PRESSURE: 93 MMHG | DIASTOLIC BLOOD PRESSURE: 54 MMHG

## 2018-06-12 VITALS — DIASTOLIC BLOOD PRESSURE: 49 MMHG | SYSTOLIC BLOOD PRESSURE: 80 MMHG

## 2018-06-12 VITALS — DIASTOLIC BLOOD PRESSURE: 79 MMHG | SYSTOLIC BLOOD PRESSURE: 110 MMHG

## 2018-06-12 VITALS — SYSTOLIC BLOOD PRESSURE: 82 MMHG | DIASTOLIC BLOOD PRESSURE: 51 MMHG

## 2018-06-12 VITALS — SYSTOLIC BLOOD PRESSURE: 92 MMHG | DIASTOLIC BLOOD PRESSURE: 53 MMHG

## 2018-06-12 VITALS — SYSTOLIC BLOOD PRESSURE: 106 MMHG | DIASTOLIC BLOOD PRESSURE: 67 MMHG

## 2018-06-12 VITALS — DIASTOLIC BLOOD PRESSURE: 57 MMHG | SYSTOLIC BLOOD PRESSURE: 101 MMHG

## 2018-06-12 VITALS — DIASTOLIC BLOOD PRESSURE: 57 MMHG | SYSTOLIC BLOOD PRESSURE: 94 MMHG

## 2018-06-12 VITALS — DIASTOLIC BLOOD PRESSURE: 62 MMHG | SYSTOLIC BLOOD PRESSURE: 106 MMHG

## 2018-06-12 VITALS — DIASTOLIC BLOOD PRESSURE: 56 MMHG | SYSTOLIC BLOOD PRESSURE: 97 MMHG

## 2018-06-12 VITALS — DIASTOLIC BLOOD PRESSURE: 51 MMHG | SYSTOLIC BLOOD PRESSURE: 90 MMHG

## 2018-06-12 VITALS — SYSTOLIC BLOOD PRESSURE: 116 MMHG | DIASTOLIC BLOOD PRESSURE: 78 MMHG

## 2018-06-12 VITALS — SYSTOLIC BLOOD PRESSURE: 89 MMHG | DIASTOLIC BLOOD PRESSURE: 53 MMHG

## 2018-06-12 VITALS — SYSTOLIC BLOOD PRESSURE: 92 MMHG | DIASTOLIC BLOOD PRESSURE: 55 MMHG

## 2018-06-12 VITALS — DIASTOLIC BLOOD PRESSURE: 52 MMHG | SYSTOLIC BLOOD PRESSURE: 92 MMHG

## 2018-06-12 VITALS — DIASTOLIC BLOOD PRESSURE: 53 MMHG | SYSTOLIC BLOOD PRESSURE: 94 MMHG

## 2018-06-12 VITALS — DIASTOLIC BLOOD PRESSURE: 57 MMHG | SYSTOLIC BLOOD PRESSURE: 97 MMHG

## 2018-06-12 VITALS — SYSTOLIC BLOOD PRESSURE: 110 MMHG | DIASTOLIC BLOOD PRESSURE: 60 MMHG

## 2018-06-12 VITALS — DIASTOLIC BLOOD PRESSURE: 54 MMHG | SYSTOLIC BLOOD PRESSURE: 93 MMHG

## 2018-06-12 VITALS — DIASTOLIC BLOOD PRESSURE: 52 MMHG | SYSTOLIC BLOOD PRESSURE: 94 MMHG

## 2018-06-12 LAB
ALBUMIN SERPL-MCNC: 1.3 G/DL (ref 3.5–5)
ALBUMIN/GLOB SERPL: 0.4 {RATIO} (ref 0.8–2)
ALP SERPL-CCNC: 272 IU/L (ref 40–150)
ALT SERPL-CCNC: 19 IU/L (ref 0–55)
ANION GAP SERPL CALC-SCNC: 13.1 MMOL/L (ref 8–16)
ANISOCYTOSIS BLD QL SMEAR: SLIGHT
BACTERIA URNS QL MICRO: (no result) /HPF
BASE EXCESS BLDA CALC-SCNC: -5 MMOL/L (ref -2–3)
BASOPHILS # BLD AUTO: 0 10*3/UL (ref 0–0.1)
BASOPHILS NFR BLD AUTO: 0.1 % (ref 0–1)
BILIRUB UR QL: (no result)
BUN SERPL-MCNC: 39 MG/DL (ref 7–26)
BUN/CREAT SERPL: 19 (ref 6–25)
CALCIUM SERPL-MCNC: 9.1 MG/DL (ref 8.4–10.2)
CHLORIDE SERPL-SCNC: 109 MMOL/L (ref 98–107)
CLARITY UR: (no result)
CO2 SERPL-SCNC: 20 MMOL/L (ref 22–29)
COLOR UR: (no result)
DEPRECATED NEUTROPHILS # BLD AUTO: 9.6 10*3/UL (ref 2.1–6.9)
DEPRECATED RBC URNS MANUAL-ACNC: (no result) /HPF (ref 0–5)
EGFRCR SERPLBLD CKD-EPI 2021: 35 ML/MIN (ref 60–?)
EOSINOPHIL # BLD AUTO: 0 10*3/UL (ref 0–0.4)
EOSINOPHIL NFR BLD AUTO: 0.3 % (ref 0–6)
EPI CELLS URNS QL MICRO: (no result) /LPF
ERYTHROCYTE [DISTWIDTH] IN CORD BLOOD: 22.9 % (ref 11.7–14.4)
GLOBULIN PLAS-MCNC: 2.9 G/DL (ref 2.3–3.5)
GLUCOSE SERPLBLD-MCNC: 98 MG/DL (ref 74–118)
HCO3 BLDA-SCNC: 21 MMOL/L (ref 23–28)
HCT VFR BLD AUTO: 28.8 % (ref 38.2–49.6)
HGB BLD-MCNC: 9.4 G/DL (ref 14–18)
HYPOCHROMIA BLD QL SMEAR: SLIGHT
KETONES UR QL STRIP.AUTO: NEGATIVE
LEUKOCYTE ESTERASE UR QL STRIP.AUTO: NEGATIVE
LYMPHOCYTES # BLD: 0.2 10*3/UL (ref 1–3.2)
LYMPHOCYTES NFR BLD AUTO: 2 % (ref 18–39.1)
LYMPHOCYTES NFR BLD MANUAL: 2 % (ref 19–48)
MCH RBC QN AUTO: 29.9 PG (ref 28–32)
MCHC RBC AUTO-ENTMCNC: 32.6 G/DL (ref 31–35)
MCV RBC AUTO: 91.7 FL (ref 81–99)
METAMYELOCYTES NFR BLD MANUAL: 1 % (ref 0–0)
MONOCYTES # BLD AUTO: 0.3 10*3/UL (ref 0.2–0.8)
MONOCYTES NFR BLD AUTO: 3 % (ref 4.4–11.3)
MONOCYTES NFR BLD MANUAL: 2 % (ref 3.4–9)
MYELOCYTES NFR BLD MANUAL: 1 % (ref 0–0)
NEUTS BAND NFR BLD MANUAL: 8 %
NEUTS SEG NFR BLD AUTO: 93.6 % (ref 38.7–80)
NEUTS SEG NFR BLD MANUAL: 86 % (ref 40–74)
NITRITE UR QL STRIP.AUTO: NEGATIVE
PCO2 BLDA: 104 MMHG (ref 80–105)
PCO2 BLDA: 36 MMHG (ref 41–51)
PH BLDA: 7.36 [PH] (ref 7.31–7.41)
PLAT MORPH BLD: (no result)
PLATELET # BLD AUTO: 137 X10E3/UL (ref 140–360)
PLATELET # BLD EST: (no result) 10*3/UL
POIKILOCYTOSIS BLD QL SMEAR: SLIGHT
POTASSIUM SERPL-SCNC: 4.1 MMOL/L (ref 3.5–5.1)
PROT UR QL STRIP.AUTO: (no result)
RBC # BLD AUTO: 3.14 X10E6/UL (ref 4.3–5.7)
RBC MORPH BLD: NORMAL
SAO2 % BLDA: 98 % (ref 95–98)
SODIUM SERPL-SCNC: 138 MMOL/L (ref 136–145)
SP GR UR STRIP: 1.03 (ref 1.01–1.02)
UROBILINOGEN UR STRIP-MCNC: 0.2 MG/DL (ref 0.2–1)
WBC #/AREA URNS HPF: (no result) /HPF (ref 0–5)

## 2018-06-12 RX ADMIN — METRONIDAZOLE SCH MLS/HR: 500 INJECTION, SOLUTION INTRAVENOUS at 14:00

## 2018-06-12 RX ADMIN — SODIUM CHLORIDE SCH MG: 900 INJECTION INTRAVENOUS at 16:30

## 2018-06-12 RX ADMIN — MIDAZOLAM HYDROCHLORIDE SCH MLS/HR: 5 INJECTION INTRAMUSCULAR; INTRAVENOUS at 00:30

## 2018-06-12 RX ADMIN — METRONIDAZOLE SCH MLS/HR: 500 INJECTION, SOLUTION INTRAVENOUS at 06:05

## 2018-06-12 RX ADMIN — METRONIDAZOLE SCH MLS/HR: 500 INJECTION, SOLUTION INTRAVENOUS at 22:40

## 2018-06-12 RX ADMIN — SODIUM CHLORIDE SCH ML: 900 IRRIGANT IRRIGATION at 16:02

## 2018-06-12 RX ADMIN — PROPOFOL PRN MLS/HR: 10 INJECTION, EMULSION INTRAVENOUS at 07:15

## 2018-06-12 RX ADMIN — PROPOFOL PRN MLS/HR: 10 INJECTION, EMULSION INTRAVENOUS at 02:25

## 2018-06-12 RX ADMIN — SODIUM CHLORIDE SCH MLS/HR: 9 INJECTION, SOLUTION INTRAVENOUS at 21:15

## 2018-06-12 RX ADMIN — SODIUM CHLORIDE SCH ML: 900 IRRIGANT IRRIGATION at 03:45

## 2018-06-12 RX ADMIN — SODIUM CHLORIDE SCH ML: 900 IRRIGANT IRRIGATION at 11:50

## 2018-06-12 RX ADMIN — SODIUM CHLORIDE SCH MLS/HR: 9 INJECTION, SOLUTION INTRAVENOUS at 17:41

## 2018-06-12 RX ADMIN — SODIUM CHLORIDE SCH MLS/HR: 9 INJECTION, SOLUTION INTRAVENOUS at 11:50

## 2018-06-12 RX ADMIN — SODIUM CHLORIDE SCH ML: 900 IRRIGANT IRRIGATION at 20:00

## 2018-06-12 RX ADMIN — CEFOXITIN SCH GM: 1 INJECTION, POWDER, FOR SOLUTION INTRAVENOUS at 05:04

## 2018-06-12 RX ADMIN — MIDAZOLAM HYDROCHLORIDE SCH MLS/HR: 5 INJECTION INTRAMUSCULAR; INTRAVENOUS at 04:00

## 2018-06-12 RX ADMIN — TAZOBACTAM SODIUM AND PIPERACILLIN SODIUM SCH MLS/HR: 375; 3 INJECTION, SOLUTION INTRAVENOUS at 05:01

## 2018-06-12 RX ADMIN — SODIUM CHLORIDE SCH ML: 900 IRRIGANT IRRIGATION at 23:00

## 2018-06-12 RX ADMIN — TAZOBACTAM SODIUM AND PIPERACILLIN SODIUM SCH MLS/HR: 375; 3 INJECTION, SOLUTION INTRAVENOUS at 00:00

## 2018-06-12 RX ADMIN — PROPOFOL PRN MLS/HR: 10 INJECTION, EMULSION INTRAVENOUS at 19:00

## 2018-06-12 RX ADMIN — SODIUM CHLORIDE SCH MLS/HR: 900 INJECTION, SOLUTION INTRAVENOUS at 02:08

## 2018-06-12 RX ADMIN — SODIUM CHLORIDE SCH MLS/HR: 9 INJECTION, SOLUTION INTRAVENOUS at 16:00

## 2018-06-12 RX ADMIN — Medication SCH MLS/HR: at 04:55

## 2018-06-12 RX ADMIN — TAZOBACTAM SODIUM AND PIPERACILLIN SODIUM SCH MLS/HR: 375; 3 INJECTION, SOLUTION INTRAVENOUS at 11:50

## 2018-06-12 RX ADMIN — SODIUM CHLORIDE SCH MLS/HR: 900 INJECTION, SOLUTION INTRAVENOUS at 16:00

## 2018-06-12 RX ADMIN — Medication SCH MLS/HR: at 11:48

## 2018-06-12 RX ADMIN — MIDAZOLAM HYDROCHLORIDE SCH MLS/HR: 5 INJECTION INTRAMUSCULAR; INTRAVENOUS at 11:51

## 2018-06-12 RX ADMIN — SODIUM CHLORIDE SCH ML: 900 IRRIGANT IRRIGATION at 07:30

## 2018-06-12 RX ADMIN — Medication SCH MLS/HR: at 22:57

## 2018-06-12 RX ADMIN — PROPOFOL PRN MLS/HR: 10 INJECTION, EMULSION INTRAVENOUS at 12:00

## 2018-06-12 RX ADMIN — SODIUM CHLORIDE SCH MLS/HR: 9 INJECTION, SOLUTION INTRAVENOUS at 23:00

## 2018-06-12 RX ADMIN — TAZOBACTAM SODIUM AND PIPERACILLIN SODIUM SCH MLS/HR: 375; 3 INJECTION, SOLUTION INTRAVENOUS at 17:41

## 2018-06-12 RX ADMIN — DEXTROSE MONOHYDRATE SCH MLS/HR: 5 INJECTION INTRAVENOUS at 23:55

## 2018-06-12 RX ADMIN — SODIUM CHLORIDE SCH MLS/HR: 9 INJECTION, SOLUTION INTRAVENOUS at 04:35

## 2018-06-12 RX ADMIN — SODIUM CHLORIDE SCH MLS/HR: 9 INJECTION, SOLUTION INTRAVENOUS at 23:55

## 2018-06-12 RX ADMIN — MIDAZOLAM HYDROCHLORIDE SCH MLS/HR: 5 INJECTION INTRAMUSCULAR; INTRAVENOUS at 20:37

## 2018-06-12 RX ADMIN — MIDAZOLAM HYDROCHLORIDE SCH MLS/HR: 5 INJECTION INTRAMUSCULAR; INTRAVENOUS at 09:54

## 2018-06-12 NOTE — DIAGNOSTIC IMAGING REPORT
EXAM: CHEST SINGLE (PORTABLE), AP 1 view

INDICATION: Intubated

COMPARISON: AP view of the chest June 11, 2018



FINDINGS:

LINES/TUBES: Stable position of endotracheal tube, nasal/orogastric tube, left

subclavian chest port and right internal jugular vein central line.



LUNGS: Stable bilateral airspace opacities. 



PLEURA: Suspected bilateral pleural effusions.



HEART AND MEDIASTINUM: Stable



BONES AND SOFT TISSUES: Stable 



IMPRESSION:

No interval change.







Signed by: Dr. Reny Gil M.D. on 6/12/2018 6:47 AM

## 2018-06-12 NOTE — CONSULTATION
DATE OF CONSULTATION:  June 12, 2018 



RENAL CONSULTATION 



REASON FOR CONSULTATION:  Acute kidney injury.



HISTORY OF PRESENT ILLNESS:  This is a 47-year-old unfortunate gentleman 

with history of colon cancer, status post hemicolectomy and colostomy, and 

came in again with abdominal pain, had a laparotomy done with evidence of 

metastatic disease.  Please see surgery operative report.  He is currently 

intubated, sedated, with 3 pressors including propofol on dopamine 24 mcg 

to maintain his blood pressure.  He is in severe shock.  He was diagnosed 

with high-grade adenoma in 2015.  He did not initially undergo a total 

colectomy which was recommended, had a bypass ileostomy.  This time was 

found to have peritoneal carcinomatosis.  Currently in respiratory failure, 

sedated.  Unable to get any history or review of systems.  He has history 

of apparent ulcerative colitis.  No prior history of any renal 

insufficiency or kidney stone disease.  Prior history of iron deficiency 

anemia, hypocalcemia, and apparent pericardial effusion.  Today his labs 

show a white count 11.6, hemoglobin 9.  Sodium 134.  Potassium 3.5.  

Bicarbonate 26.  Creatinine was 1.46, seems to be improving, with a total 

bilirubin of 0.9.  Last BNP level was 73.8 on the 23rd of January; so, this 

is not current admission.  Microbiology is pending. 



SOCIAL HISTORY:  Please see chart.



FAMILY HISTORY:  Not available at this point in time.



CURRENT MEDICATIONS:  Patient in the ICU on dopamine, normal saline at 150 

mL an hour. He is on propofol, Versed.  He is on Zosyn 3.375 grams IV q.6.  

He is on Tylenol IV q.6. p.r.n., which has been stopped.  Received 

cefoxitin earlier.  He is on Phenergan p.r.n. basis.



PHYSICAL EXAMINATION:   

GENERAL:  Patient remains intubated, sedated. 

VITALS:  Blood pressure of 106/67.  Art line reading 94/51 with a pulse 

rate 106, sinus tachy with a respiratory rate 20 and an oxygen saturation 

98%.

HEAD AND NECK:  Pupils reactive.  No icterus.  Orally intubated. 

LUNGS:  Harsh vesicular breath sounds.  Air entry good bilaterally. 

HEART:  S1 and S2 audible. 

ABDOMEN:  Abdominal examination deferred.  He has a diverting ileostomy.

LOWER EXTREMITY EXAMINATION:  Shows no edema. 



IMPRESSION:  

1. Acute tubular necrosis, non-oliguric but appears fluid-overloaded.  

Chest CT shows evidence of bilateral infiltrates and possible bilateral 

pleural effusions.  

2. Septic, in shock state.  

3. Metastatic colon carcinoma with intraperitoneal metastasis.  



Overall prognosis extremely poor.  Heavily sedated at this point in time.  

No family available.  Plan on discontinuing IV normal saline.  He will need 

TPN at some point in time.  Will start Bumex drip and attempt to diurese.  

Monitor patient's electrolytes, kidney function, urine output with you.  





 





DD:  06/12/2018 15:41

DT:  06/12/2018 15:50

Job#:  [id number]

## 2018-06-13 VITALS — DIASTOLIC BLOOD PRESSURE: 79 MMHG | SYSTOLIC BLOOD PRESSURE: 115 MMHG

## 2018-06-13 VITALS — DIASTOLIC BLOOD PRESSURE: 62 MMHG | SYSTOLIC BLOOD PRESSURE: 117 MMHG

## 2018-06-13 VITALS — DIASTOLIC BLOOD PRESSURE: 74 MMHG | SYSTOLIC BLOOD PRESSURE: 111 MMHG

## 2018-06-13 VITALS — DIASTOLIC BLOOD PRESSURE: 70 MMHG | SYSTOLIC BLOOD PRESSURE: 110 MMHG

## 2018-06-13 VITALS — DIASTOLIC BLOOD PRESSURE: 74 MMHG | SYSTOLIC BLOOD PRESSURE: 108 MMHG

## 2018-06-13 VITALS — DIASTOLIC BLOOD PRESSURE: 61 MMHG | SYSTOLIC BLOOD PRESSURE: 94 MMHG

## 2018-06-13 VITALS — DIASTOLIC BLOOD PRESSURE: 63 MMHG | SYSTOLIC BLOOD PRESSURE: 118 MMHG

## 2018-06-13 VITALS — DIASTOLIC BLOOD PRESSURE: 81 MMHG | SYSTOLIC BLOOD PRESSURE: 114 MMHG

## 2018-06-13 VITALS — SYSTOLIC BLOOD PRESSURE: 103 MMHG | DIASTOLIC BLOOD PRESSURE: 72 MMHG

## 2018-06-13 VITALS — DIASTOLIC BLOOD PRESSURE: 80 MMHG | SYSTOLIC BLOOD PRESSURE: 122 MMHG

## 2018-06-13 VITALS — DIASTOLIC BLOOD PRESSURE: 80 MMHG | SYSTOLIC BLOOD PRESSURE: 123 MMHG

## 2018-06-13 VITALS — SYSTOLIC BLOOD PRESSURE: 109 MMHG | DIASTOLIC BLOOD PRESSURE: 67 MMHG

## 2018-06-13 VITALS — SYSTOLIC BLOOD PRESSURE: 101 MMHG | DIASTOLIC BLOOD PRESSURE: 58 MMHG

## 2018-06-13 VITALS — SYSTOLIC BLOOD PRESSURE: 107 MMHG | DIASTOLIC BLOOD PRESSURE: 67 MMHG

## 2018-06-13 VITALS — DIASTOLIC BLOOD PRESSURE: 64 MMHG | SYSTOLIC BLOOD PRESSURE: 98 MMHG

## 2018-06-13 VITALS — SYSTOLIC BLOOD PRESSURE: 104 MMHG | DIASTOLIC BLOOD PRESSURE: 68 MMHG

## 2018-06-13 VITALS — SYSTOLIC BLOOD PRESSURE: 117 MMHG | DIASTOLIC BLOOD PRESSURE: 75 MMHG

## 2018-06-13 VITALS — SYSTOLIC BLOOD PRESSURE: 105 MMHG | DIASTOLIC BLOOD PRESSURE: 57 MMHG

## 2018-06-13 VITALS — DIASTOLIC BLOOD PRESSURE: 78 MMHG | SYSTOLIC BLOOD PRESSURE: 112 MMHG

## 2018-06-13 VITALS — SYSTOLIC BLOOD PRESSURE: 102 MMHG | DIASTOLIC BLOOD PRESSURE: 57 MMHG

## 2018-06-13 VITALS — SYSTOLIC BLOOD PRESSURE: 128 MMHG | DIASTOLIC BLOOD PRESSURE: 68 MMHG

## 2018-06-13 VITALS — SYSTOLIC BLOOD PRESSURE: 120 MMHG | DIASTOLIC BLOOD PRESSURE: 79 MMHG

## 2018-06-13 VITALS — DIASTOLIC BLOOD PRESSURE: 73 MMHG | SYSTOLIC BLOOD PRESSURE: 110 MMHG

## 2018-06-13 VITALS — SYSTOLIC BLOOD PRESSURE: 102 MMHG | DIASTOLIC BLOOD PRESSURE: 56 MMHG

## 2018-06-13 VITALS — SYSTOLIC BLOOD PRESSURE: 104 MMHG | DIASTOLIC BLOOD PRESSURE: 57 MMHG

## 2018-06-13 VITALS — DIASTOLIC BLOOD PRESSURE: 80 MMHG | SYSTOLIC BLOOD PRESSURE: 121 MMHG

## 2018-06-13 VITALS — DIASTOLIC BLOOD PRESSURE: 69 MMHG | SYSTOLIC BLOOD PRESSURE: 111 MMHG

## 2018-06-13 VITALS — SYSTOLIC BLOOD PRESSURE: 134 MMHG | DIASTOLIC BLOOD PRESSURE: 89 MMHG

## 2018-06-13 VITALS — DIASTOLIC BLOOD PRESSURE: 87 MMHG | SYSTOLIC BLOOD PRESSURE: 139 MMHG

## 2018-06-13 VITALS — SYSTOLIC BLOOD PRESSURE: 120 MMHG | DIASTOLIC BLOOD PRESSURE: 80 MMHG

## 2018-06-13 VITALS — SYSTOLIC BLOOD PRESSURE: 115 MMHG | DIASTOLIC BLOOD PRESSURE: 75 MMHG

## 2018-06-13 VITALS — DIASTOLIC BLOOD PRESSURE: 73 MMHG | SYSTOLIC BLOOD PRESSURE: 111 MMHG

## 2018-06-13 VITALS — DIASTOLIC BLOOD PRESSURE: 56 MMHG | SYSTOLIC BLOOD PRESSURE: 108 MMHG

## 2018-06-13 VITALS — SYSTOLIC BLOOD PRESSURE: 116 MMHG | DIASTOLIC BLOOD PRESSURE: 74 MMHG

## 2018-06-13 VITALS — SYSTOLIC BLOOD PRESSURE: 119 MMHG | DIASTOLIC BLOOD PRESSURE: 82 MMHG

## 2018-06-13 VITALS — SYSTOLIC BLOOD PRESSURE: 122 MMHG | DIASTOLIC BLOOD PRESSURE: 66 MMHG

## 2018-06-13 VITALS — SYSTOLIC BLOOD PRESSURE: 103 MMHG | DIASTOLIC BLOOD PRESSURE: 64 MMHG

## 2018-06-13 VITALS — SYSTOLIC BLOOD PRESSURE: 117 MMHG | DIASTOLIC BLOOD PRESSURE: 85 MMHG

## 2018-06-13 VITALS — SYSTOLIC BLOOD PRESSURE: 118 MMHG | DIASTOLIC BLOOD PRESSURE: 77 MMHG

## 2018-06-13 VITALS — DIASTOLIC BLOOD PRESSURE: 70 MMHG | SYSTOLIC BLOOD PRESSURE: 108 MMHG

## 2018-06-13 VITALS — DIASTOLIC BLOOD PRESSURE: 76 MMHG | SYSTOLIC BLOOD PRESSURE: 118 MMHG

## 2018-06-13 VITALS — DIASTOLIC BLOOD PRESSURE: 75 MMHG | SYSTOLIC BLOOD PRESSURE: 118 MMHG

## 2018-06-13 VITALS — SYSTOLIC BLOOD PRESSURE: 117 MMHG | DIASTOLIC BLOOD PRESSURE: 76 MMHG

## 2018-06-13 VITALS — SYSTOLIC BLOOD PRESSURE: 114 MMHG | DIASTOLIC BLOOD PRESSURE: 72 MMHG

## 2018-06-13 VITALS — DIASTOLIC BLOOD PRESSURE: 88 MMHG | SYSTOLIC BLOOD PRESSURE: 126 MMHG

## 2018-06-13 VITALS — DIASTOLIC BLOOD PRESSURE: 69 MMHG | SYSTOLIC BLOOD PRESSURE: 112 MMHG

## 2018-06-13 VITALS — SYSTOLIC BLOOD PRESSURE: 109 MMHG | DIASTOLIC BLOOD PRESSURE: 74 MMHG

## 2018-06-13 VITALS — DIASTOLIC BLOOD PRESSURE: 61 MMHG | SYSTOLIC BLOOD PRESSURE: 113 MMHG

## 2018-06-13 VITALS — DIASTOLIC BLOOD PRESSURE: 79 MMHG | SYSTOLIC BLOOD PRESSURE: 123 MMHG

## 2018-06-13 VITALS — DIASTOLIC BLOOD PRESSURE: 70 MMHG | SYSTOLIC BLOOD PRESSURE: 103 MMHG

## 2018-06-13 VITALS — DIASTOLIC BLOOD PRESSURE: 54 MMHG | SYSTOLIC BLOOD PRESSURE: 106 MMHG

## 2018-06-13 VITALS — DIASTOLIC BLOOD PRESSURE: 85 MMHG | SYSTOLIC BLOOD PRESSURE: 117 MMHG

## 2018-06-13 VITALS — SYSTOLIC BLOOD PRESSURE: 107 MMHG | DIASTOLIC BLOOD PRESSURE: 60 MMHG

## 2018-06-13 VITALS — SYSTOLIC BLOOD PRESSURE: 103 MMHG | DIASTOLIC BLOOD PRESSURE: 54 MMHG

## 2018-06-13 VITALS — DIASTOLIC BLOOD PRESSURE: 74 MMHG | SYSTOLIC BLOOD PRESSURE: 113 MMHG

## 2018-06-13 VITALS — DIASTOLIC BLOOD PRESSURE: 76 MMHG | SYSTOLIC BLOOD PRESSURE: 115 MMHG

## 2018-06-13 VITALS — SYSTOLIC BLOOD PRESSURE: 111 MMHG | DIASTOLIC BLOOD PRESSURE: 59 MMHG

## 2018-06-13 VITALS — SYSTOLIC BLOOD PRESSURE: 116 MMHG | DIASTOLIC BLOOD PRESSURE: 75 MMHG

## 2018-06-13 VITALS — SYSTOLIC BLOOD PRESSURE: 113 MMHG | DIASTOLIC BLOOD PRESSURE: 77 MMHG

## 2018-06-13 VITALS — SYSTOLIC BLOOD PRESSURE: 123 MMHG | DIASTOLIC BLOOD PRESSURE: 78 MMHG

## 2018-06-13 VITALS — SYSTOLIC BLOOD PRESSURE: 113 MMHG | DIASTOLIC BLOOD PRESSURE: 71 MMHG

## 2018-06-13 VITALS — SYSTOLIC BLOOD PRESSURE: 110 MMHG | DIASTOLIC BLOOD PRESSURE: 56 MMHG

## 2018-06-13 VITALS — SYSTOLIC BLOOD PRESSURE: 112 MMHG | DIASTOLIC BLOOD PRESSURE: 75 MMHG

## 2018-06-13 VITALS — DIASTOLIC BLOOD PRESSURE: 62 MMHG | SYSTOLIC BLOOD PRESSURE: 118 MMHG

## 2018-06-13 VITALS — SYSTOLIC BLOOD PRESSURE: 132 MMHG | DIASTOLIC BLOOD PRESSURE: 88 MMHG

## 2018-06-13 VITALS — SYSTOLIC BLOOD PRESSURE: 119 MMHG | DIASTOLIC BLOOD PRESSURE: 75 MMHG

## 2018-06-13 VITALS — DIASTOLIC BLOOD PRESSURE: 79 MMHG | SYSTOLIC BLOOD PRESSURE: 117 MMHG

## 2018-06-13 VITALS — SYSTOLIC BLOOD PRESSURE: 125 MMHG | DIASTOLIC BLOOD PRESSURE: 83 MMHG

## 2018-06-13 VITALS — SYSTOLIC BLOOD PRESSURE: 105 MMHG | DIASTOLIC BLOOD PRESSURE: 59 MMHG

## 2018-06-13 VITALS — DIASTOLIC BLOOD PRESSURE: 83 MMHG | SYSTOLIC BLOOD PRESSURE: 120 MMHG

## 2018-06-13 VITALS — SYSTOLIC BLOOD PRESSURE: 107 MMHG | DIASTOLIC BLOOD PRESSURE: 58 MMHG

## 2018-06-13 VITALS — SYSTOLIC BLOOD PRESSURE: 126 MMHG | DIASTOLIC BLOOD PRESSURE: 74 MMHG

## 2018-06-13 VITALS — SYSTOLIC BLOOD PRESSURE: 106 MMHG | DIASTOLIC BLOOD PRESSURE: 74 MMHG

## 2018-06-13 VITALS — DIASTOLIC BLOOD PRESSURE: 77 MMHG | SYSTOLIC BLOOD PRESSURE: 115 MMHG

## 2018-06-13 VITALS — DIASTOLIC BLOOD PRESSURE: 78 MMHG | SYSTOLIC BLOOD PRESSURE: 124 MMHG

## 2018-06-13 VITALS — SYSTOLIC BLOOD PRESSURE: 108 MMHG | DIASTOLIC BLOOD PRESSURE: 71 MMHG

## 2018-06-13 VITALS — SYSTOLIC BLOOD PRESSURE: 114 MMHG | DIASTOLIC BLOOD PRESSURE: 71 MMHG

## 2018-06-13 VITALS — DIASTOLIC BLOOD PRESSURE: 71 MMHG | SYSTOLIC BLOOD PRESSURE: 103 MMHG

## 2018-06-13 VITALS — SYSTOLIC BLOOD PRESSURE: 112 MMHG | DIASTOLIC BLOOD PRESSURE: 73 MMHG

## 2018-06-13 VITALS — DIASTOLIC BLOOD PRESSURE: 63 MMHG | SYSTOLIC BLOOD PRESSURE: 117 MMHG

## 2018-06-13 VITALS — DIASTOLIC BLOOD PRESSURE: 76 MMHG | SYSTOLIC BLOOD PRESSURE: 112 MMHG

## 2018-06-13 VITALS — DIASTOLIC BLOOD PRESSURE: 74 MMHG | SYSTOLIC BLOOD PRESSURE: 126 MMHG

## 2018-06-13 VITALS — SYSTOLIC BLOOD PRESSURE: 121 MMHG | DIASTOLIC BLOOD PRESSURE: 79 MMHG

## 2018-06-13 VITALS — DIASTOLIC BLOOD PRESSURE: 68 MMHG | SYSTOLIC BLOOD PRESSURE: 112 MMHG

## 2018-06-13 VITALS — SYSTOLIC BLOOD PRESSURE: 114 MMHG | DIASTOLIC BLOOD PRESSURE: 79 MMHG

## 2018-06-13 VITALS — SYSTOLIC BLOOD PRESSURE: 111 MMHG | DIASTOLIC BLOOD PRESSURE: 73 MMHG

## 2018-06-13 VITALS — SYSTOLIC BLOOD PRESSURE: 116 MMHG | DIASTOLIC BLOOD PRESSURE: 77 MMHG

## 2018-06-13 VITALS — DIASTOLIC BLOOD PRESSURE: 60 MMHG | SYSTOLIC BLOOD PRESSURE: 100 MMHG

## 2018-06-13 LAB
ALBUMIN SERPL-MCNC: 1.3 G/DL (ref 3.5–5)
ALBUMIN/GLOB SERPL: 0.4 {RATIO} (ref 0.8–2)
ALP SERPL-CCNC: 447 IU/L (ref 40–150)
ALT SERPL-CCNC: 24 IU/L (ref 0–55)
ANION GAP SERPL CALC-SCNC: 15.2 MMOL/L (ref 8–16)
ANISOCYTOSIS BLD QL SMEAR: (no result)
BASOPHILS # BLD AUTO: 0 10*3/UL (ref 0–0.1)
BASOPHILS NFR BLD AUTO: 0.2 % (ref 0–1)
BUN SERPL-MCNC: 31 MG/DL (ref 7–26)
BUN/CREAT SERPL: 16 (ref 6–25)
CALCIUM SERPL-MCNC: 10.8 MG/DL (ref 8.4–10.2)
CHLORIDE SERPL-SCNC: 107 MMOL/L (ref 98–107)
CO2 SERPL-SCNC: 22 MMOL/L (ref 22–29)
DACRYOCYTES BLD QL SMEAR: (no result)
DEPRECATED NEUTROPHILS # BLD AUTO: 9.8 10*3/UL (ref 2.1–6.9)
DEPRECATED PHOSPHATE SERPL-MCNC: 6.1 MG/DL (ref 2.3–4.7)
EGFRCR SERPLBLD CKD-EPI 2021: 38 ML/MIN (ref 60–?)
EOSINOPHIL # BLD AUTO: 0 10*3/UL (ref 0–0.4)
EOSINOPHIL NFR BLD AUTO: 0.4 % (ref 0–6)
ERYTHROCYTE [DISTWIDTH] IN CORD BLOOD: 22.5 % (ref 11.7–14.4)
GLOBULIN PLAS-MCNC: 3.7 G/DL (ref 2.3–3.5)
GLUCOSE SERPLBLD-MCNC: 114 MG/DL (ref 74–118)
HCT VFR BLD AUTO: 29.4 % (ref 38.2–49.6)
HGB BLD-MCNC: 9.8 G/DL (ref 14–18)
HYPOCHROMIA BLD QL SMEAR: SLIGHT
LYMPHOCYTES # BLD: 0.2 10*3/UL (ref 1–3.2)
LYMPHOCYTES NFR BLD AUTO: 1.7 % (ref 18–39.1)
LYMPHOCYTES NFR BLD MANUAL: 2 % (ref 19–48)
MACROCYTES BLD QL SMEAR: SLIGHT
MAGNESIUM SERPL-MCNC: 1.3 MG/DL (ref 1.3–2.1)
MCH RBC QN AUTO: 30 PG (ref 28–32)
MCHC RBC AUTO-ENTMCNC: 33.3 G/DL (ref 31–35)
MCV RBC AUTO: 89.9 FL (ref 81–99)
METAMYELOCYTES NFR BLD MANUAL: 1 % (ref 0–0)
MONOCYTES # BLD AUTO: 0.5 10*3/UL (ref 0.2–0.8)
MONOCYTES NFR BLD AUTO: 4.6 % (ref 4.4–11.3)
MONOCYTES NFR BLD MANUAL: 3 % (ref 3.4–9)
MYELOCYTES NFR BLD MANUAL: 1 % (ref 0–0)
NEUTS BAND NFR BLD MANUAL: 2 %
NEUTS SEG NFR BLD AUTO: 90.5 % (ref 38.7–80)
NEUTS SEG NFR BLD MANUAL: 91 % (ref 40–74)
PLAT MORPH BLD: (no result)
PLATELET # BLD AUTO: 134 X10E3/UL (ref 140–360)
PLATELET # BLD EST: (no result) 10*3/UL
POIKILOCYTOSIS BLD QL SMEAR: SLIGHT
POTASSIUM SERPL-SCNC: 3.2 MMOL/L (ref 3.5–5.1)
RBC # BLD AUTO: 3.27 X10E6/UL (ref 4.3–5.7)
RBC MORPH BLD: (no result)
SODIUM SERPL-SCNC: 141 MMOL/L (ref 136–145)

## 2018-06-13 RX ADMIN — MIDAZOLAM HYDROCHLORIDE SCH MLS/HR: 5 INJECTION INTRAMUSCULAR; INTRAVENOUS at 01:50

## 2018-06-13 RX ADMIN — SODIUM CHLORIDE SCH MG: 900 INJECTION INTRAVENOUS at 16:44

## 2018-06-13 RX ADMIN — SODIUM CHLORIDE SCH ML: 900 IRRIGANT IRRIGATION at 15:56

## 2018-06-13 RX ADMIN — Medication SCH MLS/HR: at 04:00

## 2018-06-13 RX ADMIN — SODIUM CHLORIDE SCH GM: 9 INJECTION, SOLUTION INTRAVENOUS at 20:39

## 2018-06-13 RX ADMIN — SODIUM CHLORIDE SCH ML: 900 IRRIGANT IRRIGATION at 11:30

## 2018-06-13 RX ADMIN — SODIUM CHLORIDE SCH ML: 900 IRRIGANT IRRIGATION at 20:39

## 2018-06-13 RX ADMIN — DEXTROSE MONOHYDRATE SCH MLS/HR: 5 INJECTION INTRAVENOUS at 18:00

## 2018-06-13 RX ADMIN — SODIUM CHLORIDE SCH MLS/HR: 900 INJECTION, SOLUTION INTRAVENOUS at 10:36

## 2018-06-13 RX ADMIN — METRONIDAZOLE SCH MLS/HR: 500 INJECTION, SOLUTION INTRAVENOUS at 22:22

## 2018-06-13 RX ADMIN — PROPOFOL PRN MLS/HR: 10 INJECTION, EMULSION INTRAVENOUS at 00:25

## 2018-06-13 RX ADMIN — SODIUM CHLORIDE SCH MLS/HR: 9 INJECTION, SOLUTION INTRAVENOUS at 20:00

## 2018-06-13 RX ADMIN — MIDAZOLAM HYDROCHLORIDE SCH MLS/HR: 5 INJECTION INTRAMUSCULAR; INTRAVENOUS at 10:34

## 2018-06-13 RX ADMIN — METRONIDAZOLE SCH MLS/HR: 500 INJECTION, SOLUTION INTRAVENOUS at 14:14

## 2018-06-13 RX ADMIN — TAZOBACTAM SODIUM AND PIPERACILLIN SODIUM SCH MLS/HR: 375; 3 INJECTION, SOLUTION INTRAVENOUS at 00:30

## 2018-06-13 RX ADMIN — TAZOBACTAM SODIUM AND PIPERACILLIN SODIUM SCH MLS/HR: 375; 3 INJECTION, SOLUTION INTRAVENOUS at 05:30

## 2018-06-13 RX ADMIN — FLUCONAZOLE IN SODIUM CHLORIDE SCH MLS/HR: 2 INJECTION, SOLUTION INTRAVENOUS at 20:39

## 2018-06-13 RX ADMIN — Medication SCH MLS/HR: at 14:21

## 2018-06-13 RX ADMIN — BUMETANIDE SCH MG: 0.25 INJECTION INTRAMUSCULAR; INTRAVENOUS at 22:22

## 2018-06-13 RX ADMIN — SODIUM CHLORIDE SCH ML: 900 IRRIGANT IRRIGATION at 04:00

## 2018-06-13 RX ADMIN — PROPOFOL PRN MLS/HR: 10 INJECTION, EMULSION INTRAVENOUS at 21:38

## 2018-06-13 RX ADMIN — PROPOFOL PRN MLS/HR: 10 INJECTION, EMULSION INTRAVENOUS at 16:07

## 2018-06-13 RX ADMIN — TAZOBACTAM SODIUM AND PIPERACILLIN SODIUM SCH MLS/HR: 375; 3 INJECTION, SOLUTION INTRAVENOUS at 18:00

## 2018-06-13 RX ADMIN — METRONIDAZOLE SCH MLS/HR: 500 INJECTION, SOLUTION INTRAVENOUS at 06:15

## 2018-06-13 RX ADMIN — SODIUM CHLORIDE SCH MLS/HR: 9 INJECTION, SOLUTION INTRAVENOUS at 10:16

## 2018-06-13 RX ADMIN — Medication SCH MLS/HR: at 03:35

## 2018-06-13 RX ADMIN — VANCOMYCIN HYDROCHLORIDE SCH MLS/HR: 1 INJECTION, SOLUTION INTRAVENOUS at 16:44

## 2018-06-13 RX ADMIN — TAZOBACTAM SODIUM AND PIPERACILLIN SODIUM SCH MLS/HR: 375; 3 INJECTION, SOLUTION INTRAVENOUS at 13:47

## 2018-06-13 RX ADMIN — SODIUM CHLORIDE SCH MLS/HR: 9 INJECTION, SOLUTION INTRAVENOUS at 13:20

## 2018-06-13 RX ADMIN — SODIUM CHLORIDE SCH ML: 900 IRRIGANT IRRIGATION at 10:16

## 2018-06-13 RX ADMIN — SODIUM CHLORIDE SCH MLS/HR: 900 INJECTION, SOLUTION INTRAVENOUS at 00:30

## 2018-06-13 RX ADMIN — SODIUM CHLORIDE SCH MLS/HR: 900 INJECTION, SOLUTION INTRAVENOUS at 19:27

## 2018-06-13 RX ADMIN — PROPOFOL PRN MLS/HR: 10 INJECTION, EMULSION INTRAVENOUS at 05:30

## 2018-06-13 RX ADMIN — ACETAMINOPHEN PRN MG: 10 INJECTION, SOLUTION INTRAVENOUS at 23:49

## 2018-06-13 NOTE — CONSULTATION
DATE OF CONSULTATION:  June 13, 2018 



REASON FOR CONSULTATION:  Sepsis and recommendation for antibiotic. 



This patient who has been here for 12 days is a 47-year-old white male who 

has a history of ulcerative colitis.  The patient has had colostomy 

abdominal wall surgery some time ago, unknown.  The patient comes into the 

hospital here.  The patient originally was admitted on June 1, 2018, with 

abdominal pain.  He was diagnosed with small-bowel obstruction.  He was 

made n.p.o. and NG tube was placed.  Was started on Zosyn.  He was seen by 

Dr. Hardwick for anemia and neutropenia.  He was seen by Dr. Ortiz.  

The patient was found to have small-bowel obstruction.  The patient had 

high-grade adenoma in 2014.  In January 2018, he was found to have 

intraperitoneal met.  He got chemotherapy and underwent total laparotomy, 

small-bowel resection and bypass ileostomy was done.  He was found to have 

peritoneal carcinomatosis. The patient is currently intubated and 

tachycardic.  He had fever.  Infectious disease was consulted.  



The patient apparently is a 47 year old, and he was told he had extensive 

ulcerative colitis, who recommended colectomy 15 years ago, but he refused. 

 He presented in 2015 with high-grade adenocarcinoma and underwent total 

colectomy, bypass ileostomy.  The patient is coming back with small-bowel 

obstruction, and apparently extensive peritoneal carcinomatosis.  He was 

admitted.  When he first came, he had a creatinine of 1.46.  He was started 

on piperacillin/tazobactam, Zosyn and Flagyl, but he continued to have 

fever.  Infectious disease was consulted.  Patient is currently intubated 

and sedated.  History was taken mainly from the chart and also discussed 

with the attending, Dr. Bruno Corral, who knows him very well. 



REVIEW OF SYSTEMS:  Could not be obtained.  



SOCIAL HISTORY:  There is no smoking, drug abuse or alcohol use.



FAMILY HISTORY:  Noncontributory.





PHYSICAL EXAMINATION

GENERAL:  He is currently alert.  He is currently sedated and intubated. 

VITALS:  Stable.  Afebrile. He had fever earlier.  

HEENT:  Normocephalic. 

CHEST:  Few crackles.  Coarse.  

ABDOMEN:  Soft. 



IMPRESSION:  Sepsis in a patient who has extensive adenocarcinoma.  He has 

been here for 12 days.  I am concerned about peritonitis from gram-positive 

cocci versus fungal.  I would suggest to discontinue Zosyn and start him on 

vancomycin, Diflucan, cefepime, and Flagyl.  Will adjust all for kidney 

function.  Will follow.  Prognosis guarded.  Will follow vancomycin trough. 

 Further recommendations to follow. 







 



DD:  06/13/2018 15:27

DT:  06/13/2018 15:51

Job#:  [id number]

## 2018-06-13 NOTE — DIAGNOSTIC IMAGING REPORT
EXAM: CHEST SINGLE (PORTABLE), AP 1 view

INDICATION: Intubated

COMPARISON: AP view of the chest June 12, 2018



FINDINGS:

LINES/TUBES: Stable position of endotracheal tube, nasal/orogastric tube, right

internal jugular vein central line and left subclavian chest port.



LUNGS: Slight improved aeration of the lungs. Persistent bilateral airspace

opacities. 



PLEURA: Persistent bilateral pleural effusions.



HEART AND MEDIASTINUM: Stable



BONES AND SOFT TISSUES: Stable 



IMPRESSION:

Slight improved aeration of the lungs, otherwise no significant interval

change.







Signed by: Dr. Reny Gil M.D. on 6/13/2018 6:27 AM

## 2018-06-14 VITALS — SYSTOLIC BLOOD PRESSURE: 120 MMHG | DIASTOLIC BLOOD PRESSURE: 76 MMHG

## 2018-06-14 VITALS — DIASTOLIC BLOOD PRESSURE: 75 MMHG | SYSTOLIC BLOOD PRESSURE: 127 MMHG

## 2018-06-14 VITALS — DIASTOLIC BLOOD PRESSURE: 67 MMHG | SYSTOLIC BLOOD PRESSURE: 108 MMHG

## 2018-06-14 VITALS — SYSTOLIC BLOOD PRESSURE: 122 MMHG | DIASTOLIC BLOOD PRESSURE: 73 MMHG

## 2018-06-14 VITALS — DIASTOLIC BLOOD PRESSURE: 66 MMHG | SYSTOLIC BLOOD PRESSURE: 107 MMHG

## 2018-06-14 VITALS — DIASTOLIC BLOOD PRESSURE: 62 MMHG | SYSTOLIC BLOOD PRESSURE: 107 MMHG

## 2018-06-14 VITALS — DIASTOLIC BLOOD PRESSURE: 69 MMHG | SYSTOLIC BLOOD PRESSURE: 115 MMHG

## 2018-06-14 VITALS — DIASTOLIC BLOOD PRESSURE: 73 MMHG | SYSTOLIC BLOOD PRESSURE: 119 MMHG

## 2018-06-14 VITALS — DIASTOLIC BLOOD PRESSURE: 71 MMHG | SYSTOLIC BLOOD PRESSURE: 118 MMHG

## 2018-06-14 VITALS — DIASTOLIC BLOOD PRESSURE: 71 MMHG | SYSTOLIC BLOOD PRESSURE: 116 MMHG

## 2018-06-14 VITALS — SYSTOLIC BLOOD PRESSURE: 109 MMHG | DIASTOLIC BLOOD PRESSURE: 67 MMHG

## 2018-06-14 VITALS — SYSTOLIC BLOOD PRESSURE: 108 MMHG | DIASTOLIC BLOOD PRESSURE: 68 MMHG

## 2018-06-14 VITALS — SYSTOLIC BLOOD PRESSURE: 122 MMHG | DIASTOLIC BLOOD PRESSURE: 78 MMHG

## 2018-06-14 VITALS — SYSTOLIC BLOOD PRESSURE: 104 MMHG | DIASTOLIC BLOOD PRESSURE: 52 MMHG

## 2018-06-14 VITALS — SYSTOLIC BLOOD PRESSURE: 115 MMHG | DIASTOLIC BLOOD PRESSURE: 71 MMHG

## 2018-06-14 VITALS — DIASTOLIC BLOOD PRESSURE: 72 MMHG | SYSTOLIC BLOOD PRESSURE: 126 MMHG

## 2018-06-14 VITALS — SYSTOLIC BLOOD PRESSURE: 111 MMHG | DIASTOLIC BLOOD PRESSURE: 69 MMHG

## 2018-06-14 VITALS — SYSTOLIC BLOOD PRESSURE: 124 MMHG | DIASTOLIC BLOOD PRESSURE: 76 MMHG

## 2018-06-14 VITALS — DIASTOLIC BLOOD PRESSURE: 75 MMHG | SYSTOLIC BLOOD PRESSURE: 115 MMHG

## 2018-06-14 VITALS — DIASTOLIC BLOOD PRESSURE: 73 MMHG | SYSTOLIC BLOOD PRESSURE: 121 MMHG

## 2018-06-14 VITALS — DIASTOLIC BLOOD PRESSURE: 66 MMHG | SYSTOLIC BLOOD PRESSURE: 102 MMHG

## 2018-06-14 VITALS — DIASTOLIC BLOOD PRESSURE: 69 MMHG | SYSTOLIC BLOOD PRESSURE: 111 MMHG

## 2018-06-14 VITALS — SYSTOLIC BLOOD PRESSURE: 112 MMHG | DIASTOLIC BLOOD PRESSURE: 68 MMHG

## 2018-06-14 VITALS — SYSTOLIC BLOOD PRESSURE: 98 MMHG | DIASTOLIC BLOOD PRESSURE: 57 MMHG

## 2018-06-14 VITALS — SYSTOLIC BLOOD PRESSURE: 127 MMHG | DIASTOLIC BLOOD PRESSURE: 76 MMHG

## 2018-06-14 VITALS — SYSTOLIC BLOOD PRESSURE: 109 MMHG | DIASTOLIC BLOOD PRESSURE: 66 MMHG

## 2018-06-14 VITALS — DIASTOLIC BLOOD PRESSURE: 74 MMHG | SYSTOLIC BLOOD PRESSURE: 122 MMHG

## 2018-06-14 VITALS — DIASTOLIC BLOOD PRESSURE: 65 MMHG | SYSTOLIC BLOOD PRESSURE: 101 MMHG

## 2018-06-14 VITALS — DIASTOLIC BLOOD PRESSURE: 72 MMHG | SYSTOLIC BLOOD PRESSURE: 120 MMHG

## 2018-06-14 VITALS — SYSTOLIC BLOOD PRESSURE: 146 MMHG | DIASTOLIC BLOOD PRESSURE: 90 MMHG

## 2018-06-14 VITALS — SYSTOLIC BLOOD PRESSURE: 110 MMHG | DIASTOLIC BLOOD PRESSURE: 65 MMHG

## 2018-06-14 VITALS — DIASTOLIC BLOOD PRESSURE: 69 MMHG | SYSTOLIC BLOOD PRESSURE: 109 MMHG

## 2018-06-14 VITALS — SYSTOLIC BLOOD PRESSURE: 124 MMHG | DIASTOLIC BLOOD PRESSURE: 81 MMHG

## 2018-06-14 VITALS — SYSTOLIC BLOOD PRESSURE: 124 MMHG | DIASTOLIC BLOOD PRESSURE: 77 MMHG

## 2018-06-14 VITALS — SYSTOLIC BLOOD PRESSURE: 132 MMHG | DIASTOLIC BLOOD PRESSURE: 92 MMHG

## 2018-06-14 VITALS — SYSTOLIC BLOOD PRESSURE: 118 MMHG | DIASTOLIC BLOOD PRESSURE: 72 MMHG

## 2018-06-14 VITALS — SYSTOLIC BLOOD PRESSURE: 112 MMHG | DIASTOLIC BLOOD PRESSURE: 69 MMHG

## 2018-06-14 VITALS — SYSTOLIC BLOOD PRESSURE: 122 MMHG | DIASTOLIC BLOOD PRESSURE: 74 MMHG

## 2018-06-14 VITALS — DIASTOLIC BLOOD PRESSURE: 68 MMHG | SYSTOLIC BLOOD PRESSURE: 112 MMHG

## 2018-06-14 VITALS — DIASTOLIC BLOOD PRESSURE: 64 MMHG | SYSTOLIC BLOOD PRESSURE: 114 MMHG

## 2018-06-14 VITALS — SYSTOLIC BLOOD PRESSURE: 96 MMHG | DIASTOLIC BLOOD PRESSURE: 58 MMHG

## 2018-06-14 VITALS — SYSTOLIC BLOOD PRESSURE: 103 MMHG | DIASTOLIC BLOOD PRESSURE: 64 MMHG

## 2018-06-14 VITALS — SYSTOLIC BLOOD PRESSURE: 107 MMHG | DIASTOLIC BLOOD PRESSURE: 63 MMHG

## 2018-06-14 VITALS — SYSTOLIC BLOOD PRESSURE: 124 MMHG | DIASTOLIC BLOOD PRESSURE: 79 MMHG

## 2018-06-14 VITALS — SYSTOLIC BLOOD PRESSURE: 113 MMHG | DIASTOLIC BLOOD PRESSURE: 69 MMHG

## 2018-06-14 VITALS — SYSTOLIC BLOOD PRESSURE: 111 MMHG | DIASTOLIC BLOOD PRESSURE: 68 MMHG

## 2018-06-14 VITALS — DIASTOLIC BLOOD PRESSURE: 73 MMHG | SYSTOLIC BLOOD PRESSURE: 116 MMHG

## 2018-06-14 VITALS — SYSTOLIC BLOOD PRESSURE: 110 MMHG | DIASTOLIC BLOOD PRESSURE: 71 MMHG

## 2018-06-14 VITALS — SYSTOLIC BLOOD PRESSURE: 110 MMHG | DIASTOLIC BLOOD PRESSURE: 68 MMHG

## 2018-06-14 VITALS — SYSTOLIC BLOOD PRESSURE: 103 MMHG | DIASTOLIC BLOOD PRESSURE: 62 MMHG

## 2018-06-14 VITALS — DIASTOLIC BLOOD PRESSURE: 76 MMHG | SYSTOLIC BLOOD PRESSURE: 128 MMHG

## 2018-06-14 VITALS — SYSTOLIC BLOOD PRESSURE: 114 MMHG | DIASTOLIC BLOOD PRESSURE: 71 MMHG

## 2018-06-14 VITALS — DIASTOLIC BLOOD PRESSURE: 70 MMHG | SYSTOLIC BLOOD PRESSURE: 112 MMHG

## 2018-06-14 VITALS — SYSTOLIC BLOOD PRESSURE: 134 MMHG | DIASTOLIC BLOOD PRESSURE: 68 MMHG

## 2018-06-14 VITALS — SYSTOLIC BLOOD PRESSURE: 108 MMHG | DIASTOLIC BLOOD PRESSURE: 55 MMHG

## 2018-06-14 VITALS — DIASTOLIC BLOOD PRESSURE: 67 MMHG | SYSTOLIC BLOOD PRESSURE: 114 MMHG

## 2018-06-14 VITALS — SYSTOLIC BLOOD PRESSURE: 104 MMHG | DIASTOLIC BLOOD PRESSURE: 66 MMHG

## 2018-06-14 VITALS — SYSTOLIC BLOOD PRESSURE: 123 MMHG | DIASTOLIC BLOOD PRESSURE: 77 MMHG

## 2018-06-14 VITALS — SYSTOLIC BLOOD PRESSURE: 158 MMHG | DIASTOLIC BLOOD PRESSURE: 94 MMHG

## 2018-06-14 VITALS — DIASTOLIC BLOOD PRESSURE: 64 MMHG | SYSTOLIC BLOOD PRESSURE: 105 MMHG

## 2018-06-14 VITALS — SYSTOLIC BLOOD PRESSURE: 110 MMHG | DIASTOLIC BLOOD PRESSURE: 63 MMHG

## 2018-06-14 VITALS — SYSTOLIC BLOOD PRESSURE: 112 MMHG | DIASTOLIC BLOOD PRESSURE: 65 MMHG

## 2018-06-14 VITALS — SYSTOLIC BLOOD PRESSURE: 101 MMHG | DIASTOLIC BLOOD PRESSURE: 48 MMHG

## 2018-06-14 VITALS — DIASTOLIC BLOOD PRESSURE: 71 MMHG | SYSTOLIC BLOOD PRESSURE: 117 MMHG

## 2018-06-14 VITALS — SYSTOLIC BLOOD PRESSURE: 119 MMHG | DIASTOLIC BLOOD PRESSURE: 75 MMHG

## 2018-06-14 VITALS — SYSTOLIC BLOOD PRESSURE: 116 MMHG | DIASTOLIC BLOOD PRESSURE: 72 MMHG

## 2018-06-14 VITALS — SYSTOLIC BLOOD PRESSURE: 120 MMHG | DIASTOLIC BLOOD PRESSURE: 67 MMHG

## 2018-06-14 VITALS — SYSTOLIC BLOOD PRESSURE: 108 MMHG | DIASTOLIC BLOOD PRESSURE: 63 MMHG

## 2018-06-14 VITALS — DIASTOLIC BLOOD PRESSURE: 66 MMHG | SYSTOLIC BLOOD PRESSURE: 110 MMHG

## 2018-06-14 VITALS — SYSTOLIC BLOOD PRESSURE: 108 MMHG | DIASTOLIC BLOOD PRESSURE: 66 MMHG

## 2018-06-14 VITALS — SYSTOLIC BLOOD PRESSURE: 113 MMHG | DIASTOLIC BLOOD PRESSURE: 71 MMHG

## 2018-06-14 VITALS — DIASTOLIC BLOOD PRESSURE: 68 MMHG | SYSTOLIC BLOOD PRESSURE: 110 MMHG

## 2018-06-14 VITALS — SYSTOLIC BLOOD PRESSURE: 105 MMHG | DIASTOLIC BLOOD PRESSURE: 65 MMHG

## 2018-06-14 VITALS — DIASTOLIC BLOOD PRESSURE: 79 MMHG | SYSTOLIC BLOOD PRESSURE: 124 MMHG

## 2018-06-14 VITALS — SYSTOLIC BLOOD PRESSURE: 123 MMHG | DIASTOLIC BLOOD PRESSURE: 75 MMHG

## 2018-06-14 VITALS — SYSTOLIC BLOOD PRESSURE: 134 MMHG | DIASTOLIC BLOOD PRESSURE: 84 MMHG

## 2018-06-14 VITALS — DIASTOLIC BLOOD PRESSURE: 65 MMHG | SYSTOLIC BLOOD PRESSURE: 109 MMHG

## 2018-06-14 VITALS — DIASTOLIC BLOOD PRESSURE: 57 MMHG | SYSTOLIC BLOOD PRESSURE: 101 MMHG

## 2018-06-14 VITALS — SYSTOLIC BLOOD PRESSURE: 108 MMHG | DIASTOLIC BLOOD PRESSURE: 62 MMHG

## 2018-06-14 VITALS — DIASTOLIC BLOOD PRESSURE: 73 MMHG | SYSTOLIC BLOOD PRESSURE: 114 MMHG

## 2018-06-14 VITALS — DIASTOLIC BLOOD PRESSURE: 77 MMHG | SYSTOLIC BLOOD PRESSURE: 125 MMHG

## 2018-06-14 VITALS — SYSTOLIC BLOOD PRESSURE: 108 MMHG | DIASTOLIC BLOOD PRESSURE: 53 MMHG

## 2018-06-14 VITALS — SYSTOLIC BLOOD PRESSURE: 113 MMHG | DIASTOLIC BLOOD PRESSURE: 73 MMHG

## 2018-06-14 VITALS — DIASTOLIC BLOOD PRESSURE: 63 MMHG | SYSTOLIC BLOOD PRESSURE: 106 MMHG

## 2018-06-14 VITALS — SYSTOLIC BLOOD PRESSURE: 119 MMHG | DIASTOLIC BLOOD PRESSURE: 74 MMHG

## 2018-06-14 VITALS — SYSTOLIC BLOOD PRESSURE: 110 MMHG | DIASTOLIC BLOOD PRESSURE: 69 MMHG

## 2018-06-14 VITALS — SYSTOLIC BLOOD PRESSURE: 109 MMHG | DIASTOLIC BLOOD PRESSURE: 64 MMHG

## 2018-06-14 VITALS — DIASTOLIC BLOOD PRESSURE: 71 MMHG | SYSTOLIC BLOOD PRESSURE: 122 MMHG

## 2018-06-14 VITALS — SYSTOLIC BLOOD PRESSURE: 89 MMHG | DIASTOLIC BLOOD PRESSURE: 53 MMHG

## 2018-06-14 VITALS — SYSTOLIC BLOOD PRESSURE: 130 MMHG | DIASTOLIC BLOOD PRESSURE: 85 MMHG

## 2018-06-14 VITALS — DIASTOLIC BLOOD PRESSURE: 55 MMHG | SYSTOLIC BLOOD PRESSURE: 91 MMHG

## 2018-06-14 VITALS — DIASTOLIC BLOOD PRESSURE: 63 MMHG | SYSTOLIC BLOOD PRESSURE: 107 MMHG

## 2018-06-14 LAB
ALBUMIN SERPL-MCNC: 1.4 G/DL (ref 3.5–5)
ALBUMIN/GLOB SERPL: 0.3 {RATIO} (ref 0.8–2)
ALP SERPL-CCNC: 457 IU/L (ref 40–150)
ALT SERPL-CCNC: 22 IU/L (ref 0–55)
ANION GAP SERPL CALC-SCNC: 16.8 MMOL/L (ref 8–16)
ANISOCYTOSIS BLD QL SMEAR: SLIGHT
BASOPHILS # BLD AUTO: 0 10*3/UL (ref 0–0.1)
BASOPHILS NFR BLD AUTO: 0 % (ref 0–1)
BUN SERPL-MCNC: 35 MG/DL (ref 7–26)
BUN/CREAT SERPL: 18 (ref 6–25)
CALCIUM SERPL-MCNC: 11.2 MG/DL (ref 8.4–10.2)
CHLORIDE SERPL-SCNC: 102 MMOL/L (ref 98–107)
CO2 SERPL-SCNC: 29 MMOL/L (ref 22–29)
DACRYOCYTES BLD QL SMEAR: (no result)
DEPRECATED NEUTROPHILS # BLD AUTO: 6.1 10*3/UL (ref 2.1–6.9)
DEPRECATED PHOSPHATE SERPL-MCNC: 5.8 MG/DL (ref 2.3–4.7)
EGFRCR SERPLBLD CKD-EPI 2021: 37 ML/MIN (ref 60–?)
EOSINOPHIL # BLD AUTO: 0.1 10*3/UL (ref 0–0.4)
EOSINOPHIL NFR BLD AUTO: 1.3 % (ref 0–6)
EOSINOPHIL NFR BLD MANUAL: 1 % (ref 0–7)
ERYTHROCYTE [DISTWIDTH] IN CORD BLOOD: 22.5 % (ref 11.7–14.4)
GLOBULIN PLAS-MCNC: 4.1 G/DL (ref 2.3–3.5)
GLUCOSE SERPLBLD-MCNC: 119 MG/DL (ref 74–118)
HCT VFR BLD AUTO: 26.3 % (ref 38.2–49.6)
HGB BLD-MCNC: 8.7 G/DL (ref 14–18)
HYPOCHROMIA BLD QL SMEAR: SLIGHT
LYMPHOCYTES # BLD: 0.2 10*3/UL (ref 1–3.2)
LYMPHOCYTES NFR BLD AUTO: 2.3 % (ref 18–39.1)
LYMPHOCYTES NFR BLD MANUAL: 2 % (ref 19–48)
MAGNESIUM SERPL-MCNC: 1.3 MG/DL (ref 1.3–2.1)
MCH RBC QN AUTO: 30.6 PG (ref 28–32)
MCHC RBC AUTO-ENTMCNC: 33.1 G/DL (ref 31–35)
MCV RBC AUTO: 92.6 FL (ref 81–99)
MONOCYTES # BLD AUTO: 0.3 10*3/UL (ref 0.2–0.8)
MONOCYTES NFR BLD AUTO: 4.8 % (ref 4.4–11.3)
MONOCYTES NFR BLD MANUAL: 3 % (ref 3.4–9)
MYELOCYTES NFR BLD MANUAL: 1 % (ref 0–0)
NEUTS SEG NFR BLD AUTO: 89.6 % (ref 38.7–80)
NEUTS SEG NFR BLD MANUAL: 93 % (ref 40–74)
PLAT MORPH BLD: (no result)
PLATELET # BLD AUTO: 125 X10E3/UL (ref 140–360)
PLATELET # BLD EST: (no result) 10*3/UL
POIKILOCYTOSIS BLD QL SMEAR: SLIGHT
POTASSIUM SERPL-SCNC: 2.8 MMOL/L (ref 3.5–5.1)
RBC # BLD AUTO: 2.84 X10E6/UL (ref 4.3–5.7)
RBC MORPH BLD: (no result)
SODIUM SERPL-SCNC: 145 MMOL/L (ref 136–145)

## 2018-06-14 RX ADMIN — SODIUM CHLORIDE SCH ML: 900 IRRIGANT IRRIGATION at 14:56

## 2018-06-14 RX ADMIN — SODIUM CHLORIDE SCH ML: 900 IRRIGANT IRRIGATION at 08:01

## 2018-06-14 RX ADMIN — SODIUM CHLORIDE SCH ML: 900 IRRIGANT IRRIGATION at 00:11

## 2018-06-14 RX ADMIN — DEXTROSE MONOHYDRATE SCH MLS/HR: 5 INJECTION INTRAVENOUS at 04:26

## 2018-06-14 RX ADMIN — SODIUM CHLORIDE SCH GM: 9 INJECTION, SOLUTION INTRAVENOUS at 20:32

## 2018-06-14 RX ADMIN — SODIUM CHLORIDE SCH MLS/HR: 900 INJECTION, SOLUTION INTRAVENOUS at 03:25

## 2018-06-14 RX ADMIN — SODIUM CHLORIDE SCH MLS/HR: 900 INJECTION, SOLUTION INTRAVENOUS at 11:30

## 2018-06-14 RX ADMIN — SODIUM CHLORIDE SCH MLS/HR: 9 INJECTION, SOLUTION INTRAVENOUS at 08:51

## 2018-06-14 RX ADMIN — BUMETANIDE SCH MG: 0.25 INJECTION INTRAMUSCULAR; INTRAVENOUS at 14:55

## 2018-06-14 RX ADMIN — MIDAZOLAM HYDROCHLORIDE SCH MLS/HR: 5 INJECTION INTRAMUSCULAR; INTRAVENOUS at 14:56

## 2018-06-14 RX ADMIN — SODIUM CHLORIDE SCH ML: 900 IRRIGANT IRRIGATION at 13:13

## 2018-06-14 RX ADMIN — SODIUM CHLORIDE SCH ML: 900 IRRIGANT IRRIGATION at 23:30

## 2018-06-14 RX ADMIN — SODIUM CHLORIDE SCH MLS/HR: 9 INJECTION, SOLUTION INTRAVENOUS at 21:53

## 2018-06-14 RX ADMIN — Medication SCH MLS/HR: at 03:55

## 2018-06-14 RX ADMIN — SODIUM CHLORIDE SCH MLS/HR: 9 INJECTION, SOLUTION INTRAVENOUS at 17:40

## 2018-06-14 RX ADMIN — MIDAZOLAM HYDROCHLORIDE SCH MLS/HR: 5 INJECTION INTRAMUSCULAR; INTRAVENOUS at 17:41

## 2018-06-14 RX ADMIN — SODIUM CHLORIDE SCH MLS/HR: 9 INJECTION, SOLUTION INTRAVENOUS at 02:40

## 2018-06-14 RX ADMIN — VANCOMYCIN HYDROCHLORIDE SCH MLS/HR: 1 INJECTION, SOLUTION INTRAVENOUS at 17:40

## 2018-06-14 RX ADMIN — PROPOFOL PRN MLS/HR: 10 INJECTION, EMULSION INTRAVENOUS at 00:58

## 2018-06-14 RX ADMIN — BUMETANIDE SCH MG: 0.25 INJECTION INTRAMUSCULAR; INTRAVENOUS at 05:19

## 2018-06-14 RX ADMIN — PROPOFOL PRN MLS/HR: 10 INJECTION, EMULSION INTRAVENOUS at 19:04

## 2018-06-14 RX ADMIN — PROPOFOL PRN MLS/HR: 10 INJECTION, EMULSION INTRAVENOUS at 04:52

## 2018-06-14 RX ADMIN — METRONIDAZOLE SCH MLS/HR: 500 INJECTION, SOLUTION INTRAVENOUS at 14:55

## 2018-06-14 RX ADMIN — PROPOFOL PRN MLS/HR: 10 INJECTION, EMULSION INTRAVENOUS at 07:51

## 2018-06-14 RX ADMIN — PROPOFOL PRN MLS/HR: 10 INJECTION, EMULSION INTRAVENOUS at 11:34

## 2018-06-14 RX ADMIN — ACETAMINOPHEN PRN MG: 10 INJECTION, SOLUTION INTRAVENOUS at 10:44

## 2018-06-14 RX ADMIN — SODIUM CHLORIDE PRN MLS/HR: 9 INJECTION, SOLUTION INTRAVENOUS at 10:00

## 2018-06-14 RX ADMIN — SODIUM CHLORIDE PRN MLS/HR: 9 INJECTION, SOLUTION INTRAVENOUS at 15:23

## 2018-06-14 RX ADMIN — SODIUM CHLORIDE SCH MG: 900 INJECTION INTRAVENOUS at 17:40

## 2018-06-14 RX ADMIN — SODIUM CHLORIDE SCH ML: 900 IRRIGANT IRRIGATION at 03:16

## 2018-06-14 RX ADMIN — SODIUM CHLORIDE SCH MLS/HR: 900 INJECTION, SOLUTION INTRAVENOUS at 19:05

## 2018-06-14 RX ADMIN — TAZOBACTAM SODIUM AND PIPERACILLIN SODIUM SCH MLS/HR: 375; 3 INJECTION, SOLUTION INTRAVENOUS at 05:19

## 2018-06-14 RX ADMIN — SODIUM CHLORIDE PRN MLS/HR: 9 INJECTION, SOLUTION INTRAVENOUS at 19:45

## 2018-06-14 RX ADMIN — FLUCONAZOLE IN SODIUM CHLORIDE SCH MLS/HR: 2 INJECTION, SOLUTION INTRAVENOUS at 20:32

## 2018-06-14 RX ADMIN — MIDAZOLAM HYDROCHLORIDE SCH MLS/HR: 5 INJECTION INTRAMUSCULAR; INTRAVENOUS at 20:45

## 2018-06-14 RX ADMIN — PROPOFOL PRN MLS/HR: 10 INJECTION, EMULSION INTRAVENOUS at 07:55

## 2018-06-14 RX ADMIN — SODIUM CHLORIDE SCH ML: 900 IRRIGANT IRRIGATION at 20:33

## 2018-06-14 RX ADMIN — TAZOBACTAM SODIUM AND PIPERACILLIN SODIUM SCH MLS/HR: 375; 3 INJECTION, SOLUTION INTRAVENOUS at 00:11

## 2018-06-14 RX ADMIN — METRONIDAZOLE SCH MLS/HR: 500 INJECTION, SOLUTION INTRAVENOUS at 21:47

## 2018-06-14 RX ADMIN — BUMETANIDE SCH MG: 0.25 INJECTION INTRAMUSCULAR; INTRAVENOUS at 21:47

## 2018-06-14 RX ADMIN — METRONIDAZOLE SCH MLS/HR: 500 INJECTION, SOLUTION INTRAVENOUS at 05:50

## 2018-06-14 NOTE — DIAGNOSTIC IMAGING REPORT
EXAM: CHEST SINGLE (PORTABLE), AP 1 view

INDICATION: Intubated

COMPARISON: AP view of the chest June 13, 2018



FINDINGS:

LINES/TUBES: Stable position of endotracheal tube, nasal/orogastric tube, right

internal jugular vein and left subclavian chest port.



LUNGS: Persistent bibasilar airspace opacities. 



PLEURA: Persistent bilateral pleural effusions.



HEART AND MEDIASTINUM: Normal size and contour.



BONES AND SOFT TISSUES: No acute findings. 



IMPRESSION:

No significant interval change.







Signed by: Dr. Reny Gil M.D. on 6/14/2018 6:48 AM

## 2018-06-15 VITALS — DIASTOLIC BLOOD PRESSURE: 66 MMHG | SYSTOLIC BLOOD PRESSURE: 110 MMHG

## 2018-06-15 VITALS — DIASTOLIC BLOOD PRESSURE: 56 MMHG | SYSTOLIC BLOOD PRESSURE: 111 MMHG

## 2018-06-15 VITALS — DIASTOLIC BLOOD PRESSURE: 74 MMHG | SYSTOLIC BLOOD PRESSURE: 117 MMHG

## 2018-06-15 VITALS — DIASTOLIC BLOOD PRESSURE: 40 MMHG | SYSTOLIC BLOOD PRESSURE: 82 MMHG

## 2018-06-15 VITALS — SYSTOLIC BLOOD PRESSURE: 117 MMHG | DIASTOLIC BLOOD PRESSURE: 57 MMHG

## 2018-06-15 VITALS — SYSTOLIC BLOOD PRESSURE: 109 MMHG | DIASTOLIC BLOOD PRESSURE: 61 MMHG

## 2018-06-15 VITALS — DIASTOLIC BLOOD PRESSURE: 59 MMHG | SYSTOLIC BLOOD PRESSURE: 114 MMHG

## 2018-06-15 VITALS — SYSTOLIC BLOOD PRESSURE: 165 MMHG | DIASTOLIC BLOOD PRESSURE: 109 MMHG

## 2018-06-15 VITALS — SYSTOLIC BLOOD PRESSURE: 121 MMHG | DIASTOLIC BLOOD PRESSURE: 62 MMHG

## 2018-06-15 VITALS — DIASTOLIC BLOOD PRESSURE: 64 MMHG | SYSTOLIC BLOOD PRESSURE: 120 MMHG

## 2018-06-15 VITALS — DIASTOLIC BLOOD PRESSURE: 54 MMHG | SYSTOLIC BLOOD PRESSURE: 89 MMHG

## 2018-06-15 VITALS — SYSTOLIC BLOOD PRESSURE: 124 MMHG | DIASTOLIC BLOOD PRESSURE: 65 MMHG

## 2018-06-15 VITALS — SYSTOLIC BLOOD PRESSURE: 87 MMHG | DIASTOLIC BLOOD PRESSURE: 49 MMHG

## 2018-06-15 VITALS — SYSTOLIC BLOOD PRESSURE: 160 MMHG | DIASTOLIC BLOOD PRESSURE: 100 MMHG

## 2018-06-15 VITALS — SYSTOLIC BLOOD PRESSURE: 127 MMHG | DIASTOLIC BLOOD PRESSURE: 62 MMHG

## 2018-06-15 VITALS — DIASTOLIC BLOOD PRESSURE: 65 MMHG | SYSTOLIC BLOOD PRESSURE: 113 MMHG

## 2018-06-15 VITALS — DIASTOLIC BLOOD PRESSURE: 69 MMHG | SYSTOLIC BLOOD PRESSURE: 118 MMHG

## 2018-06-15 VITALS — SYSTOLIC BLOOD PRESSURE: 114 MMHG | DIASTOLIC BLOOD PRESSURE: 63 MMHG

## 2018-06-15 VITALS — SYSTOLIC BLOOD PRESSURE: 95 MMHG | DIASTOLIC BLOOD PRESSURE: 62 MMHG

## 2018-06-15 VITALS — SYSTOLIC BLOOD PRESSURE: 115 MMHG | DIASTOLIC BLOOD PRESSURE: 66 MMHG

## 2018-06-15 VITALS — SYSTOLIC BLOOD PRESSURE: 127 MMHG | DIASTOLIC BLOOD PRESSURE: 64 MMHG

## 2018-06-15 VITALS — DIASTOLIC BLOOD PRESSURE: 57 MMHG | SYSTOLIC BLOOD PRESSURE: 116 MMHG

## 2018-06-15 VITALS — SYSTOLIC BLOOD PRESSURE: 177 MMHG | DIASTOLIC BLOOD PRESSURE: 50 MMHG

## 2018-06-15 VITALS — DIASTOLIC BLOOD PRESSURE: 63 MMHG | SYSTOLIC BLOOD PRESSURE: 107 MMHG

## 2018-06-15 VITALS — DIASTOLIC BLOOD PRESSURE: 62 MMHG | SYSTOLIC BLOOD PRESSURE: 125 MMHG

## 2018-06-15 VITALS — DIASTOLIC BLOOD PRESSURE: 78 MMHG | SYSTOLIC BLOOD PRESSURE: 162 MMHG

## 2018-06-15 VITALS — DIASTOLIC BLOOD PRESSURE: 71 MMHG | SYSTOLIC BLOOD PRESSURE: 121 MMHG

## 2018-06-15 VITALS — SYSTOLIC BLOOD PRESSURE: 104 MMHG | DIASTOLIC BLOOD PRESSURE: 62 MMHG

## 2018-06-15 VITALS — SYSTOLIC BLOOD PRESSURE: 120 MMHG | DIASTOLIC BLOOD PRESSURE: 68 MMHG

## 2018-06-15 VITALS — SYSTOLIC BLOOD PRESSURE: 136 MMHG | DIASTOLIC BLOOD PRESSURE: 69 MMHG

## 2018-06-15 VITALS — SYSTOLIC BLOOD PRESSURE: 94 MMHG | DIASTOLIC BLOOD PRESSURE: 50 MMHG

## 2018-06-15 VITALS — SYSTOLIC BLOOD PRESSURE: 158 MMHG | DIASTOLIC BLOOD PRESSURE: 69 MMHG

## 2018-06-15 VITALS — SYSTOLIC BLOOD PRESSURE: 108 MMHG | DIASTOLIC BLOOD PRESSURE: 62 MMHG

## 2018-06-15 VITALS — DIASTOLIC BLOOD PRESSURE: 56 MMHG | SYSTOLIC BLOOD PRESSURE: 106 MMHG

## 2018-06-15 VITALS — DIASTOLIC BLOOD PRESSURE: 51 MMHG | SYSTOLIC BLOOD PRESSURE: 98 MMHG

## 2018-06-15 VITALS — SYSTOLIC BLOOD PRESSURE: 129 MMHG | DIASTOLIC BLOOD PRESSURE: 64 MMHG

## 2018-06-15 VITALS — SYSTOLIC BLOOD PRESSURE: 114 MMHG | DIASTOLIC BLOOD PRESSURE: 67 MMHG

## 2018-06-15 VITALS — DIASTOLIC BLOOD PRESSURE: 60 MMHG | SYSTOLIC BLOOD PRESSURE: 113 MMHG

## 2018-06-15 VITALS — SYSTOLIC BLOOD PRESSURE: 111 MMHG | DIASTOLIC BLOOD PRESSURE: 60 MMHG

## 2018-06-15 VITALS — DIASTOLIC BLOOD PRESSURE: 64 MMHG | SYSTOLIC BLOOD PRESSURE: 123 MMHG

## 2018-06-15 VITALS — DIASTOLIC BLOOD PRESSURE: 61 MMHG | SYSTOLIC BLOOD PRESSURE: 116 MMHG

## 2018-06-15 VITALS — SYSTOLIC BLOOD PRESSURE: 167 MMHG | DIASTOLIC BLOOD PRESSURE: 106 MMHG

## 2018-06-15 VITALS — DIASTOLIC BLOOD PRESSURE: 62 MMHG | SYSTOLIC BLOOD PRESSURE: 111 MMHG

## 2018-06-15 VITALS — DIASTOLIC BLOOD PRESSURE: 48 MMHG | SYSTOLIC BLOOD PRESSURE: 97 MMHG

## 2018-06-15 VITALS — DIASTOLIC BLOOD PRESSURE: 74 MMHG | SYSTOLIC BLOOD PRESSURE: 140 MMHG

## 2018-06-15 VITALS — DIASTOLIC BLOOD PRESSURE: 64 MMHG | SYSTOLIC BLOOD PRESSURE: 107 MMHG

## 2018-06-15 VITALS — SYSTOLIC BLOOD PRESSURE: 134 MMHG | DIASTOLIC BLOOD PRESSURE: 94 MMHG

## 2018-06-15 VITALS — DIASTOLIC BLOOD PRESSURE: 71 MMHG | SYSTOLIC BLOOD PRESSURE: 128 MMHG

## 2018-06-15 VITALS — SYSTOLIC BLOOD PRESSURE: 122 MMHG | DIASTOLIC BLOOD PRESSURE: 64 MMHG

## 2018-06-15 VITALS — DIASTOLIC BLOOD PRESSURE: 65 MMHG | SYSTOLIC BLOOD PRESSURE: 105 MMHG

## 2018-06-15 VITALS — DIASTOLIC BLOOD PRESSURE: 43 MMHG | SYSTOLIC BLOOD PRESSURE: 81 MMHG

## 2018-06-15 VITALS — DIASTOLIC BLOOD PRESSURE: 48 MMHG | SYSTOLIC BLOOD PRESSURE: 93 MMHG

## 2018-06-15 VITALS — DIASTOLIC BLOOD PRESSURE: 101 MMHG | SYSTOLIC BLOOD PRESSURE: 173 MMHG

## 2018-06-15 VITALS — DIASTOLIC BLOOD PRESSURE: 66 MMHG | SYSTOLIC BLOOD PRESSURE: 116 MMHG

## 2018-06-15 VITALS — SYSTOLIC BLOOD PRESSURE: 97 MMHG | DIASTOLIC BLOOD PRESSURE: 48 MMHG

## 2018-06-15 VITALS — SYSTOLIC BLOOD PRESSURE: 133 MMHG | DIASTOLIC BLOOD PRESSURE: 84 MMHG

## 2018-06-15 VITALS — DIASTOLIC BLOOD PRESSURE: 64 MMHG | SYSTOLIC BLOOD PRESSURE: 114 MMHG

## 2018-06-15 VITALS — DIASTOLIC BLOOD PRESSURE: 65 MMHG | SYSTOLIC BLOOD PRESSURE: 120 MMHG

## 2018-06-15 VITALS — DIASTOLIC BLOOD PRESSURE: 63 MMHG | SYSTOLIC BLOOD PRESSURE: 113 MMHG

## 2018-06-15 VITALS — SYSTOLIC BLOOD PRESSURE: 160 MMHG | DIASTOLIC BLOOD PRESSURE: 81 MMHG

## 2018-06-15 VITALS — SYSTOLIC BLOOD PRESSURE: 140 MMHG | DIASTOLIC BLOOD PRESSURE: 66 MMHG

## 2018-06-15 VITALS — DIASTOLIC BLOOD PRESSURE: 47 MMHG | SYSTOLIC BLOOD PRESSURE: 96 MMHG

## 2018-06-15 VITALS — SYSTOLIC BLOOD PRESSURE: 111 MMHG | DIASTOLIC BLOOD PRESSURE: 64 MMHG

## 2018-06-15 VITALS — DIASTOLIC BLOOD PRESSURE: 75 MMHG | SYSTOLIC BLOOD PRESSURE: 123 MMHG

## 2018-06-15 VITALS — DIASTOLIC BLOOD PRESSURE: 58 MMHG | SYSTOLIC BLOOD PRESSURE: 117 MMHG

## 2018-06-15 VITALS — DIASTOLIC BLOOD PRESSURE: 69 MMHG | SYSTOLIC BLOOD PRESSURE: 143 MMHG

## 2018-06-15 VITALS — SYSTOLIC BLOOD PRESSURE: 138 MMHG | DIASTOLIC BLOOD PRESSURE: 82 MMHG

## 2018-06-15 VITALS — DIASTOLIC BLOOD PRESSURE: 53 MMHG | SYSTOLIC BLOOD PRESSURE: 109 MMHG

## 2018-06-15 VITALS — SYSTOLIC BLOOD PRESSURE: 95 MMHG | DIASTOLIC BLOOD PRESSURE: 50 MMHG

## 2018-06-15 VITALS — DIASTOLIC BLOOD PRESSURE: 49 MMHG | SYSTOLIC BLOOD PRESSURE: 99 MMHG

## 2018-06-15 VITALS — SYSTOLIC BLOOD PRESSURE: 98 MMHG | DIASTOLIC BLOOD PRESSURE: 51 MMHG

## 2018-06-15 VITALS — DIASTOLIC BLOOD PRESSURE: 58 MMHG | SYSTOLIC BLOOD PRESSURE: 107 MMHG

## 2018-06-15 VITALS — DIASTOLIC BLOOD PRESSURE: 107 MMHG | SYSTOLIC BLOOD PRESSURE: 165 MMHG

## 2018-06-15 VITALS — SYSTOLIC BLOOD PRESSURE: 96 MMHG | DIASTOLIC BLOOD PRESSURE: 50 MMHG

## 2018-06-15 VITALS — DIASTOLIC BLOOD PRESSURE: 66 MMHG | SYSTOLIC BLOOD PRESSURE: 101 MMHG

## 2018-06-15 VITALS — DIASTOLIC BLOOD PRESSURE: 72 MMHG | SYSTOLIC BLOOD PRESSURE: 123 MMHG

## 2018-06-15 VITALS — SYSTOLIC BLOOD PRESSURE: 129 MMHG | DIASTOLIC BLOOD PRESSURE: 66 MMHG

## 2018-06-15 VITALS — SYSTOLIC BLOOD PRESSURE: 184 MMHG | DIASTOLIC BLOOD PRESSURE: 94 MMHG

## 2018-06-15 VITALS — SYSTOLIC BLOOD PRESSURE: 120 MMHG | DIASTOLIC BLOOD PRESSURE: 64 MMHG

## 2018-06-15 VITALS — DIASTOLIC BLOOD PRESSURE: 68 MMHG | SYSTOLIC BLOOD PRESSURE: 133 MMHG

## 2018-06-15 LAB
ALBUMIN SERPL-MCNC: 1.3 G/DL (ref 3.5–5)
ALBUMIN/GLOB SERPL: 0.3 {RATIO} (ref 0.8–2)
ALP SERPL-CCNC: 380 IU/L (ref 40–150)
ALT SERPL-CCNC: 17 IU/L (ref 0–55)
ANION GAP SERPL CALC-SCNC: 12.6 MMOL/L (ref 8–16)
ANION GAP SERPL CALC-SCNC: 14.9 MMOL/L (ref 8–16)
BUN SERPL-MCNC: 30 MG/DL (ref 7–26)
BUN SERPL-MCNC: 31 MG/DL (ref 7–26)
BUN/CREAT SERPL: 22 (ref 6–25)
BUN/CREAT SERPL: 23 (ref 6–25)
CALCIUM SERPL-MCNC: 11.5 MG/DL (ref 8.4–10.2)
CALCIUM SERPL-MCNC: 11.8 MG/DL (ref 8.4–10.2)
CHLORIDE SERPL-SCNC: 105 MMOL/L (ref 98–107)
CHLORIDE SERPL-SCNC: 106 MMOL/L (ref 98–107)
CO2 SERPL-SCNC: 28 MMOL/L (ref 22–29)
CO2 SERPL-SCNC: 32 MMOL/L (ref 22–29)
DEPRECATED PHOSPHATE SERPL-MCNC: 3.7 MG/DL (ref 2.3–4.7)
EGFRCR SERPLBLD CKD-EPI 2021: 52 ML/MIN (ref 60–?)
EGFRCR SERPLBLD CKD-EPI 2021: 59 ML/MIN (ref 60–?)
GLOBULIN PLAS-MCNC: 3.8 G/DL (ref 2.3–3.5)
GLUCOSE SERPLBLD-MCNC: 156 MG/DL (ref 74–118)
GLUCOSE SERPLBLD-MCNC: 169 MG/DL (ref 74–118)
MAGNESIUM SERPL-MCNC: 1.6 MG/DL (ref 1.3–2.1)
MAGNESIUM SERPL-MCNC: 2.1 MG/DL (ref 1.3–2.1)
POTASSIUM SERPL-SCNC: 2.6 MMOL/L (ref 3.5–5.1)
POTASSIUM SERPL-SCNC: 3.9 MMOL/L (ref 3.5–5.1)
SODIUM SERPL-SCNC: 145 MMOL/L (ref 136–145)
SODIUM SERPL-SCNC: 147 MMOL/L (ref 136–145)

## 2018-06-15 RX ADMIN — MIDAZOLAM HYDROCHLORIDE SCH MLS/HR: 5 INJECTION INTRAMUSCULAR; INTRAVENOUS at 00:23

## 2018-06-15 RX ADMIN — MIDAZOLAM HYDROCHLORIDE SCH MLS/HR: 5 INJECTION INTRAMUSCULAR; INTRAVENOUS at 23:45

## 2018-06-15 RX ADMIN — POTASSIUM CHLORIDE SCH MLS/HR: 14.9 INJECTION, SOLUTION INTRAVENOUS at 11:08

## 2018-06-15 RX ADMIN — MIDAZOLAM HYDROCHLORIDE SCH MLS/HR: 5 INJECTION INTRAMUSCULAR; INTRAVENOUS at 16:00

## 2018-06-15 RX ADMIN — VANCOMYCIN HYDROCHLORIDE SCH MLS/HR: 1 INJECTION, SOLUTION INTRAVENOUS at 16:40

## 2018-06-15 RX ADMIN — MIDAZOLAM HYDROCHLORIDE SCH MLS/HR: 5 INJECTION INTRAMUSCULAR; INTRAVENOUS at 19:56

## 2018-06-15 RX ADMIN — LORAZEPAM PRN MG: 2 INJECTION INTRAMUSCULAR; INTRAVENOUS at 10:03

## 2018-06-15 RX ADMIN — POTASSIUM CHLORIDE SCH MLS/HR: 14.9 INJECTION, SOLUTION INTRAVENOUS at 09:15

## 2018-06-15 RX ADMIN — MIDAZOLAM HYDROCHLORIDE SCH MLS/HR: 5 INJECTION INTRAMUSCULAR; INTRAVENOUS at 10:38

## 2018-06-15 RX ADMIN — SODIUM CHLORIDE PRN MLS/HR: 9 INJECTION, SOLUTION INTRAVENOUS at 08:30

## 2018-06-15 RX ADMIN — SODIUM CHLORIDE SCH MLS/HR: 9 INJECTION, SOLUTION INTRAVENOUS at 12:00

## 2018-06-15 RX ADMIN — SODIUM CHLORIDE SCH MLS/HR: 900 INJECTION, SOLUTION INTRAVENOUS at 17:44

## 2018-06-15 RX ADMIN — METRONIDAZOLE SCH MLS/HR: 500 INJECTION, SOLUTION INTRAVENOUS at 21:30

## 2018-06-15 RX ADMIN — SODIUM CHLORIDE PRN MLS/HR: 9 INJECTION, SOLUTION INTRAVENOUS at 14:15

## 2018-06-15 RX ADMIN — BUMETANIDE SCH MG: 0.25 INJECTION INTRAMUSCULAR; INTRAVENOUS at 05:49

## 2018-06-15 RX ADMIN — BUMETANIDE SCH MG: 0.25 INJECTION INTRAMUSCULAR; INTRAVENOUS at 21:00

## 2018-06-15 RX ADMIN — PROPOFOL PRN MLS/HR: 10 INJECTION, EMULSION INTRAVENOUS at 01:40

## 2018-06-15 RX ADMIN — Medication SCH MLS/HR: at 03:09

## 2018-06-15 RX ADMIN — FLUCONAZOLE IN SODIUM CHLORIDE SCH MLS/HR: 2 INJECTION, SOLUTION INTRAVENOUS at 21:54

## 2018-06-15 RX ADMIN — SODIUM CHLORIDE SCH MLS/HR: 9 INJECTION, SOLUTION INTRAVENOUS at 01:40

## 2018-06-15 RX ADMIN — POTASSIUM CHLORIDE SCH MLS/HR: 14.9 INJECTION, SOLUTION INTRAVENOUS at 14:49

## 2018-06-15 RX ADMIN — MIDAZOLAM HYDROCHLORIDE SCH MLS/HR: 5 INJECTION INTRAMUSCULAR; INTRAVENOUS at 04:00

## 2018-06-15 RX ADMIN — SODIUM CHLORIDE SCH ML: 900 IRRIGANT IRRIGATION at 03:10

## 2018-06-15 RX ADMIN — POTASSIUM CHLORIDE SCH MLS/HR: 14.9 INJECTION, SOLUTION INTRAVENOUS at 10:03

## 2018-06-15 RX ADMIN — SODIUM CHLORIDE SCH MG: 900 INJECTION INTRAVENOUS at 16:10

## 2018-06-15 RX ADMIN — PROPOFOL PRN MLS/HR: 10 INJECTION, EMULSION INTRAVENOUS at 06:39

## 2018-06-15 RX ADMIN — MIDAZOLAM HYDROCHLORIDE SCH MLS/HR: 5 INJECTION INTRAMUSCULAR; INTRAVENOUS at 07:45

## 2018-06-15 RX ADMIN — BUMETANIDE SCH MG: 0.25 INJECTION INTRAMUSCULAR; INTRAVENOUS at 14:49

## 2018-06-15 RX ADMIN — SODIUM CHLORIDE SCH ML: 900 IRRIGANT IRRIGATION at 11:50

## 2018-06-15 RX ADMIN — METRONIDAZOLE SCH MLS/HR: 500 INJECTION, SOLUTION INTRAVENOUS at 05:49

## 2018-06-15 RX ADMIN — SODIUM CHLORIDE PRN MLS/HR: 9 INJECTION, SOLUTION INTRAVENOUS at 23:45

## 2018-06-15 RX ADMIN — SODIUM CHLORIDE SCH MLS/HR: 9 INJECTION, SOLUTION INTRAVENOUS at 05:20

## 2018-06-15 RX ADMIN — Medication SCH MLS/HR: at 11:00

## 2018-06-15 RX ADMIN — SODIUM CHLORIDE PRN MLS/HR: 9 INJECTION, SOLUTION INTRAVENOUS at 18:00

## 2018-06-15 RX ADMIN — SODIUM CHLORIDE SCH MLS/HR: 900 INJECTION, SOLUTION INTRAVENOUS at 11:09

## 2018-06-15 RX ADMIN — SODIUM CHLORIDE SCH MLS/HR: 9 INJECTION, SOLUTION INTRAVENOUS at 18:40

## 2018-06-15 RX ADMIN — HALOPERIDOL PRN MG: 5 TABLET ORAL at 10:03

## 2018-06-15 RX ADMIN — SODIUM CHLORIDE SCH GM: 9 INJECTION, SOLUTION INTRAVENOUS at 21:00

## 2018-06-15 RX ADMIN — METRONIDAZOLE SCH MLS/HR: 500 INJECTION, SOLUTION INTRAVENOUS at 14:49

## 2018-06-15 RX ADMIN — DEXTROSE MONOHYDRATE SCH MLS/HR: 5 INJECTION INTRAVENOUS at 21:00

## 2018-06-15 RX ADMIN — SODIUM CHLORIDE SCH ML: 900 IRRIGANT IRRIGATION at 07:45

## 2018-06-15 RX ADMIN — PROPOFOL PRN MLS/HR: 10 INJECTION, EMULSION INTRAVENOUS at 15:15

## 2018-06-15 RX ADMIN — SODIUM CHLORIDE SCH ML: 900 IRRIGANT IRRIGATION at 16:24

## 2018-06-15 RX ADMIN — SODIUM CHLORIDE SCH ML: 900 IRRIGANT IRRIGATION at 19:39

## 2018-06-15 RX ADMIN — SODIUM CHLORIDE PRN MLS/HR: 9 INJECTION, SOLUTION INTRAVENOUS at 03:08

## 2018-06-15 RX ADMIN — ACETAMINOPHEN PRN MG: 10 INJECTION, SOLUTION INTRAVENOUS at 19:00

## 2018-06-15 NOTE — DIAGNOSTIC IMAGING REPORT
EXAM: CHEST SINGLE (PORTABLE), AP 1 view

INDICATION: Intubated

COMPARISON: AP view of the chest June 14, 2018



FINDINGS:

LINES/TUBES: Stable support lines and tubes



LUNGS: Persistent by basilar atelectasis 



PLEURA: Persistent bilateral pleural effusions



HEART AND MEDIASTINUM: Stable



BONES AND SOFT TISSUES: No acute findings. 



IMPRESSION:

No interval change







Signed by: Dr. Reny Gil M.D. on 6/15/2018 6:11 AM

## 2018-06-16 VITALS — SYSTOLIC BLOOD PRESSURE: 144 MMHG | DIASTOLIC BLOOD PRESSURE: 83 MMHG

## 2018-06-16 VITALS — DIASTOLIC BLOOD PRESSURE: 60 MMHG | SYSTOLIC BLOOD PRESSURE: 119 MMHG

## 2018-06-16 VITALS — SYSTOLIC BLOOD PRESSURE: 120 MMHG | DIASTOLIC BLOOD PRESSURE: 57 MMHG

## 2018-06-16 VITALS — SYSTOLIC BLOOD PRESSURE: 78 MMHG | DIASTOLIC BLOOD PRESSURE: 33 MMHG

## 2018-06-16 VITALS — DIASTOLIC BLOOD PRESSURE: 57 MMHG | SYSTOLIC BLOOD PRESSURE: 100 MMHG

## 2018-06-16 VITALS — SYSTOLIC BLOOD PRESSURE: 131 MMHG | DIASTOLIC BLOOD PRESSURE: 62 MMHG

## 2018-06-16 VITALS — SYSTOLIC BLOOD PRESSURE: 126 MMHG | DIASTOLIC BLOOD PRESSURE: 60 MMHG

## 2018-06-16 VITALS — DIASTOLIC BLOOD PRESSURE: 62 MMHG | SYSTOLIC BLOOD PRESSURE: 117 MMHG

## 2018-06-16 VITALS — DIASTOLIC BLOOD PRESSURE: 62 MMHG | SYSTOLIC BLOOD PRESSURE: 125 MMHG

## 2018-06-16 VITALS — DIASTOLIC BLOOD PRESSURE: 66 MMHG | SYSTOLIC BLOOD PRESSURE: 124 MMHG

## 2018-06-16 VITALS — SYSTOLIC BLOOD PRESSURE: 134 MMHG | DIASTOLIC BLOOD PRESSURE: 70 MMHG

## 2018-06-16 VITALS — SYSTOLIC BLOOD PRESSURE: 107 MMHG | DIASTOLIC BLOOD PRESSURE: 55 MMHG

## 2018-06-16 VITALS — SYSTOLIC BLOOD PRESSURE: 118 MMHG | DIASTOLIC BLOOD PRESSURE: 61 MMHG

## 2018-06-16 VITALS — DIASTOLIC BLOOD PRESSURE: 44 MMHG | SYSTOLIC BLOOD PRESSURE: 85 MMHG

## 2018-06-16 VITALS — DIASTOLIC BLOOD PRESSURE: 72 MMHG | SYSTOLIC BLOOD PRESSURE: 138 MMHG

## 2018-06-16 VITALS — SYSTOLIC BLOOD PRESSURE: 79 MMHG | DIASTOLIC BLOOD PRESSURE: 38 MMHG

## 2018-06-16 VITALS — DIASTOLIC BLOOD PRESSURE: 58 MMHG | SYSTOLIC BLOOD PRESSURE: 111 MMHG

## 2018-06-16 VITALS — DIASTOLIC BLOOD PRESSURE: 61 MMHG | SYSTOLIC BLOOD PRESSURE: 116 MMHG

## 2018-06-16 VITALS — SYSTOLIC BLOOD PRESSURE: 104 MMHG | DIASTOLIC BLOOD PRESSURE: 50 MMHG

## 2018-06-16 VITALS — DIASTOLIC BLOOD PRESSURE: 56 MMHG | SYSTOLIC BLOOD PRESSURE: 113 MMHG

## 2018-06-16 VITALS — SYSTOLIC BLOOD PRESSURE: 121 MMHG | DIASTOLIC BLOOD PRESSURE: 70 MMHG

## 2018-06-16 VITALS — SYSTOLIC BLOOD PRESSURE: 116 MMHG | DIASTOLIC BLOOD PRESSURE: 60 MMHG

## 2018-06-16 VITALS — DIASTOLIC BLOOD PRESSURE: 55 MMHG | SYSTOLIC BLOOD PRESSURE: 112 MMHG

## 2018-06-16 VITALS — SYSTOLIC BLOOD PRESSURE: 111 MMHG | DIASTOLIC BLOOD PRESSURE: 58 MMHG

## 2018-06-16 VITALS — DIASTOLIC BLOOD PRESSURE: 64 MMHG | SYSTOLIC BLOOD PRESSURE: 118 MMHG

## 2018-06-16 VITALS — SYSTOLIC BLOOD PRESSURE: 126 MMHG | DIASTOLIC BLOOD PRESSURE: 66 MMHG

## 2018-06-16 VITALS — DIASTOLIC BLOOD PRESSURE: 60 MMHG | SYSTOLIC BLOOD PRESSURE: 122 MMHG

## 2018-06-16 VITALS — SYSTOLIC BLOOD PRESSURE: 74 MMHG | DIASTOLIC BLOOD PRESSURE: 37 MMHG

## 2018-06-16 VITALS — DIASTOLIC BLOOD PRESSURE: 80 MMHG | SYSTOLIC BLOOD PRESSURE: 131 MMHG

## 2018-06-16 VITALS — SYSTOLIC BLOOD PRESSURE: 73 MMHG | DIASTOLIC BLOOD PRESSURE: 34 MMHG

## 2018-06-16 VITALS — SYSTOLIC BLOOD PRESSURE: 122 MMHG | DIASTOLIC BLOOD PRESSURE: 60 MMHG

## 2018-06-16 VITALS — SYSTOLIC BLOOD PRESSURE: 93 MMHG | DIASTOLIC BLOOD PRESSURE: 41 MMHG

## 2018-06-16 VITALS — SYSTOLIC BLOOD PRESSURE: 109 MMHG | DIASTOLIC BLOOD PRESSURE: 58 MMHG

## 2018-06-16 VITALS — SYSTOLIC BLOOD PRESSURE: 111 MMHG | DIASTOLIC BLOOD PRESSURE: 59 MMHG

## 2018-06-16 VITALS — DIASTOLIC BLOOD PRESSURE: 70 MMHG | SYSTOLIC BLOOD PRESSURE: 132 MMHG

## 2018-06-16 VITALS — DIASTOLIC BLOOD PRESSURE: 71 MMHG | SYSTOLIC BLOOD PRESSURE: 121 MMHG

## 2018-06-16 VITALS — SYSTOLIC BLOOD PRESSURE: 116 MMHG | DIASTOLIC BLOOD PRESSURE: 57 MMHG

## 2018-06-16 VITALS — DIASTOLIC BLOOD PRESSURE: 51 MMHG | SYSTOLIC BLOOD PRESSURE: 94 MMHG

## 2018-06-16 VITALS — SYSTOLIC BLOOD PRESSURE: 103 MMHG | DIASTOLIC BLOOD PRESSURE: 55 MMHG

## 2018-06-16 VITALS — DIASTOLIC BLOOD PRESSURE: 67 MMHG | SYSTOLIC BLOOD PRESSURE: 148 MMHG

## 2018-06-16 VITALS — SYSTOLIC BLOOD PRESSURE: 117 MMHG | DIASTOLIC BLOOD PRESSURE: 56 MMHG

## 2018-06-16 VITALS — DIASTOLIC BLOOD PRESSURE: 85 MMHG | SYSTOLIC BLOOD PRESSURE: 185 MMHG

## 2018-06-16 VITALS — SYSTOLIC BLOOD PRESSURE: 126 MMHG | DIASTOLIC BLOOD PRESSURE: 54 MMHG

## 2018-06-16 VITALS — SYSTOLIC BLOOD PRESSURE: 115 MMHG | DIASTOLIC BLOOD PRESSURE: 57 MMHG

## 2018-06-16 VITALS — SYSTOLIC BLOOD PRESSURE: 114 MMHG | DIASTOLIC BLOOD PRESSURE: 59 MMHG

## 2018-06-16 VITALS — DIASTOLIC BLOOD PRESSURE: 52 MMHG | SYSTOLIC BLOOD PRESSURE: 97 MMHG

## 2018-06-16 VITALS — DIASTOLIC BLOOD PRESSURE: 75 MMHG | SYSTOLIC BLOOD PRESSURE: 162 MMHG

## 2018-06-16 VITALS — SYSTOLIC BLOOD PRESSURE: 133 MMHG | DIASTOLIC BLOOD PRESSURE: 74 MMHG

## 2018-06-16 VITALS — SYSTOLIC BLOOD PRESSURE: 108 MMHG | DIASTOLIC BLOOD PRESSURE: 58 MMHG

## 2018-06-16 VITALS — DIASTOLIC BLOOD PRESSURE: 63 MMHG | SYSTOLIC BLOOD PRESSURE: 117 MMHG

## 2018-06-16 VITALS — DIASTOLIC BLOOD PRESSURE: 78 MMHG | SYSTOLIC BLOOD PRESSURE: 151 MMHG

## 2018-06-16 VITALS — SYSTOLIC BLOOD PRESSURE: 133 MMHG | DIASTOLIC BLOOD PRESSURE: 71 MMHG

## 2018-06-16 VITALS — SYSTOLIC BLOOD PRESSURE: 124 MMHG | DIASTOLIC BLOOD PRESSURE: 78 MMHG

## 2018-06-16 VITALS — SYSTOLIC BLOOD PRESSURE: 100 MMHG | DIASTOLIC BLOOD PRESSURE: 46 MMHG

## 2018-06-16 VITALS — DIASTOLIC BLOOD PRESSURE: 73 MMHG | SYSTOLIC BLOOD PRESSURE: 148 MMHG

## 2018-06-16 VITALS — SYSTOLIC BLOOD PRESSURE: 146 MMHG | DIASTOLIC BLOOD PRESSURE: 67 MMHG

## 2018-06-16 VITALS — DIASTOLIC BLOOD PRESSURE: 46 MMHG | SYSTOLIC BLOOD PRESSURE: 97 MMHG

## 2018-06-16 VITALS — DIASTOLIC BLOOD PRESSURE: 65 MMHG | SYSTOLIC BLOOD PRESSURE: 101 MMHG

## 2018-06-16 VITALS — DIASTOLIC BLOOD PRESSURE: 54 MMHG | SYSTOLIC BLOOD PRESSURE: 103 MMHG

## 2018-06-16 VITALS — DIASTOLIC BLOOD PRESSURE: 95 MMHG | SYSTOLIC BLOOD PRESSURE: 143 MMHG

## 2018-06-16 VITALS — SYSTOLIC BLOOD PRESSURE: 98 MMHG | DIASTOLIC BLOOD PRESSURE: 53 MMHG

## 2018-06-16 VITALS — SYSTOLIC BLOOD PRESSURE: 131 MMHG | DIASTOLIC BLOOD PRESSURE: 61 MMHG

## 2018-06-16 VITALS — DIASTOLIC BLOOD PRESSURE: 77 MMHG | SYSTOLIC BLOOD PRESSURE: 147 MMHG

## 2018-06-16 VITALS — SYSTOLIC BLOOD PRESSURE: 91 MMHG | DIASTOLIC BLOOD PRESSURE: 49 MMHG

## 2018-06-16 LAB
ALBUMIN SERPL-MCNC: 1.3 G/DL (ref 3.5–5)
ALBUMIN/GLOB SERPL: 0.3 {RATIO} (ref 0.8–2)
ALP SERPL-CCNC: 355 IU/L (ref 40–150)
ALT SERPL-CCNC: 18 IU/L (ref 0–55)
ANION GAP SERPL CALC-SCNC: 13.1 MMOL/L (ref 8–16)
BASOPHILS # BLD AUTO: 0 10*3/UL (ref 0–0.1)
BASOPHILS NFR BLD AUTO: 0.4 % (ref 0–1)
BUN SERPL-MCNC: 30 MG/DL (ref 7–26)
BUN/CREAT SERPL: 24 (ref 6–25)
CALCIUM SERPL-MCNC: 11.5 MG/DL (ref 8.4–10.2)
CHLORIDE SERPL-SCNC: 108 MMOL/L (ref 98–107)
CO2 SERPL-SCNC: 29 MMOL/L (ref 22–29)
DEPRECATED NEUTROPHILS # BLD AUTO: 2.2 10*3/UL (ref 2.1–6.9)
DEPRECATED PHOSPHATE SERPL-MCNC: 3.6 MG/DL (ref 2.3–4.7)
EGFRCR SERPLBLD CKD-EPI 2021: > 60 ML/MIN (ref 60–?)
EOSINOPHIL # BLD AUTO: 0.1 10*3/UL (ref 0–0.4)
EOSINOPHIL NFR BLD AUTO: 1.8 % (ref 0–6)
ERYTHROCYTE [DISTWIDTH] IN CORD BLOOD: 21.2 % (ref 11.7–14.4)
GLOBULIN PLAS-MCNC: 4.1 G/DL (ref 2.3–3.5)
GLUCOSE SERPLBLD-MCNC: 164 MG/DL (ref 74–118)
HCT VFR BLD AUTO: 22.9 % (ref 38.2–49.6)
HGB BLD-MCNC: 7.5 G/DL (ref 14–18)
LYMPHOCYTES # BLD: 0.2 10*3/UL (ref 1–3.2)
LYMPHOCYTES NFR BLD AUTO: 6.2 % (ref 18–39.1)
MAGNESIUM SERPL-MCNC: 1.5 MG/DL (ref 1.3–2.1)
MCH RBC QN AUTO: 30.6 PG (ref 28–32)
MCHC RBC AUTO-ENTMCNC: 32.8 G/DL (ref 31–35)
MCV RBC AUTO: 93.5 FL (ref 81–99)
MONOCYTES # BLD AUTO: 0.2 10*3/UL (ref 0.2–0.8)
MONOCYTES NFR BLD AUTO: 8.4 % (ref 4.4–11.3)
NEUTS SEG NFR BLD AUTO: 79.2 % (ref 38.7–80)
PLATELET # BLD AUTO: 102 X10E3/UL (ref 140–360)
POTASSIUM SERPL-SCNC: 3.1 MMOL/L (ref 3.5–5.1)
RBC # BLD AUTO: 2.45 X10E6/UL (ref 4.3–5.7)
SODIUM SERPL-SCNC: 147 MMOL/L (ref 136–145)

## 2018-06-16 RX ADMIN — SODIUM CHLORIDE SCH ML: 900 IRRIGANT IRRIGATION at 23:30

## 2018-06-16 RX ADMIN — METRONIDAZOLE SCH MLS/HR: 500 INJECTION, SOLUTION INTRAVENOUS at 13:43

## 2018-06-16 RX ADMIN — SODIUM CHLORIDE SCH MLS/HR: 900 INJECTION, SOLUTION INTRAVENOUS at 15:05

## 2018-06-16 RX ADMIN — DEXTROSE MONOHYDRATE SCH MLS/HR: 5 INJECTION INTRAVENOUS at 18:03

## 2018-06-16 RX ADMIN — FUROSEMIDE SCH MG: 10 INJECTION, SOLUTION INTRAMUSCULAR; INTRAVENOUS at 21:19

## 2018-06-16 RX ADMIN — SODIUM CHLORIDE SCH ML: 900 IRRIGANT IRRIGATION at 15:53

## 2018-06-16 RX ADMIN — SODIUM CHLORIDE SCH MLS/HR: 9 INJECTION, SOLUTION INTRAVENOUS at 14:40

## 2018-06-16 RX ADMIN — SODIUM CHLORIDE SCH ML: 900 IRRIGANT IRRIGATION at 20:18

## 2018-06-16 RX ADMIN — HALOPERIDOL PRN MG: 5 TABLET ORAL at 09:48

## 2018-06-16 RX ADMIN — SODIUM CHLORIDE SCH MLS/HR: 9 INJECTION, SOLUTION INTRAVENOUS at 08:00

## 2018-06-16 RX ADMIN — MIDAZOLAM HYDROCHLORIDE SCH MLS/HR: 5 INJECTION INTRAMUSCULAR; INTRAVENOUS at 04:32

## 2018-06-16 RX ADMIN — BUMETANIDE SCH MG: 0.25 INJECTION INTRAMUSCULAR; INTRAVENOUS at 15:28

## 2018-06-16 RX ADMIN — SODIUM CHLORIDE SCH ML: 900 IRRIGANT IRRIGATION at 01:41

## 2018-06-16 RX ADMIN — BUMETANIDE SCH MG: 0.25 INJECTION INTRAMUSCULAR; INTRAVENOUS at 05:54

## 2018-06-16 RX ADMIN — PROPOFOL PRN MLS/HR: 10 INJECTION, EMULSION INTRAVENOUS at 16:20

## 2018-06-16 RX ADMIN — SODIUM CHLORIDE SCH MLS/HR: 9 INJECTION, SOLUTION INTRAVENOUS at 21:03

## 2018-06-16 RX ADMIN — FLUCONAZOLE IN SODIUM CHLORIDE SCH MLS/HR: 2 INJECTION, SOLUTION INTRAVENOUS at 21:03

## 2018-06-16 RX ADMIN — MIDAZOLAM HYDROCHLORIDE SCH MLS/HR: 5 INJECTION INTRAMUSCULAR; INTRAVENOUS at 08:47

## 2018-06-16 RX ADMIN — SODIUM CHLORIDE SCH MLS/HR: 900 INJECTION, SOLUTION INTRAVENOUS at 07:39

## 2018-06-16 RX ADMIN — SODIUM CHLORIDE SCH ML: 900 IRRIGANT IRRIGATION at 11:45

## 2018-06-16 RX ADMIN — SODIUM CHLORIDE SCH MLS/HR: 900 INJECTION, SOLUTION INTRAVENOUS at 21:36

## 2018-06-16 RX ADMIN — SODIUM CHLORIDE SCH MLS/HR: 900 INJECTION, SOLUTION INTRAVENOUS at 00:39

## 2018-06-16 RX ADMIN — SODIUM CHLORIDE SCH GM: 9 INJECTION, SOLUTION INTRAVENOUS at 20:57

## 2018-06-16 RX ADMIN — SODIUM CHLORIDE SCH MG: 900 INJECTION INTRAVENOUS at 18:03

## 2018-06-16 RX ADMIN — PROPOFOL PRN MLS/HR: 10 INJECTION, EMULSION INTRAVENOUS at 10:10

## 2018-06-16 RX ADMIN — VANCOMYCIN HYDROCHLORIDE SCH MLS/HR: 1 INJECTION, SOLUTION INTRAVENOUS at 18:48

## 2018-06-16 RX ADMIN — PROPOFOL PRN MLS/HR: 10 INJECTION, EMULSION INTRAVENOUS at 01:58

## 2018-06-16 RX ADMIN — MIDAZOLAM HYDROCHLORIDE SCH MLS/HR: 5 INJECTION INTRAMUSCULAR; INTRAVENOUS at 13:44

## 2018-06-16 RX ADMIN — Medication SCH MLS/HR: at 11:00

## 2018-06-16 RX ADMIN — LORAZEPAM PRN MG: 2 INJECTION INTRAMUSCULAR; INTRAVENOUS at 08:20

## 2018-06-16 RX ADMIN — METRONIDAZOLE SCH MLS/HR: 500 INJECTION, SOLUTION INTRAVENOUS at 22:04

## 2018-06-16 RX ADMIN — MIDAZOLAM HYDROCHLORIDE SCH MLS/HR: 5 INJECTION INTRAMUSCULAR; INTRAVENOUS at 22:40

## 2018-06-16 RX ADMIN — Medication SCH GM: at 18:36

## 2018-06-16 RX ADMIN — MIDAZOLAM HYDROCHLORIDE SCH MLS/HR: 5 INJECTION INTRAMUSCULAR; INTRAVENOUS at 20:25

## 2018-06-16 RX ADMIN — MIDAZOLAM HYDROCHLORIDE SCH MLS/HR: 5 INJECTION INTRAMUSCULAR; INTRAVENOUS at 16:58

## 2018-06-16 RX ADMIN — METRONIDAZOLE SCH MLS/HR: 500 INJECTION, SOLUTION INTRAVENOUS at 05:54

## 2018-06-16 RX ADMIN — MIDAZOLAM HYDROCHLORIDE SCH MLS/HR: 5 INJECTION INTRAMUSCULAR; INTRAVENOUS at 19:01

## 2018-06-16 RX ADMIN — SODIUM CHLORIDE SCH ML: 900 IRRIGANT IRRIGATION at 03:40

## 2018-06-16 RX ADMIN — SODIUM CHLORIDE SCH MLS/HR: 9 INJECTION, SOLUTION INTRAVENOUS at 03:19

## 2018-06-16 RX ADMIN — ZIPRASIDONE MESYLATE PRN MG: 20 INJECTION, POWDER, LYOPHILIZED, FOR SOLUTION INTRAMUSCULAR at 16:54

## 2018-06-16 RX ADMIN — SODIUM CHLORIDE PRN MLS/HR: 9 INJECTION, SOLUTION INTRAVENOUS at 21:46

## 2018-06-16 RX ADMIN — SODIUM CHLORIDE SCH ML: 900 IRRIGANT IRRIGATION at 08:49

## 2018-06-16 RX ADMIN — SODIUM CHLORIDE PRN MLS/HR: 9 INJECTION, SOLUTION INTRAVENOUS at 04:16

## 2018-06-17 VITALS — DIASTOLIC BLOOD PRESSURE: 64 MMHG | SYSTOLIC BLOOD PRESSURE: 109 MMHG

## 2018-06-17 VITALS — SYSTOLIC BLOOD PRESSURE: 148 MMHG | DIASTOLIC BLOOD PRESSURE: 77 MMHG

## 2018-06-17 VITALS — SYSTOLIC BLOOD PRESSURE: 155 MMHG | DIASTOLIC BLOOD PRESSURE: 89 MMHG

## 2018-06-17 VITALS — DIASTOLIC BLOOD PRESSURE: 87 MMHG | SYSTOLIC BLOOD PRESSURE: 132 MMHG

## 2018-06-17 VITALS — DIASTOLIC BLOOD PRESSURE: 73 MMHG | SYSTOLIC BLOOD PRESSURE: 138 MMHG

## 2018-06-17 VITALS — SYSTOLIC BLOOD PRESSURE: 144 MMHG | DIASTOLIC BLOOD PRESSURE: 79 MMHG

## 2018-06-17 VITALS — DIASTOLIC BLOOD PRESSURE: 70 MMHG | SYSTOLIC BLOOD PRESSURE: 134 MMHG

## 2018-06-17 VITALS — SYSTOLIC BLOOD PRESSURE: 119 MMHG | DIASTOLIC BLOOD PRESSURE: 58 MMHG

## 2018-06-17 VITALS — DIASTOLIC BLOOD PRESSURE: 67 MMHG | SYSTOLIC BLOOD PRESSURE: 116 MMHG

## 2018-06-17 VITALS — DIASTOLIC BLOOD PRESSURE: 50 MMHG | SYSTOLIC BLOOD PRESSURE: 104 MMHG

## 2018-06-17 VITALS — DIASTOLIC BLOOD PRESSURE: 57 MMHG | SYSTOLIC BLOOD PRESSURE: 119 MMHG

## 2018-06-17 VITALS — DIASTOLIC BLOOD PRESSURE: 55 MMHG | SYSTOLIC BLOOD PRESSURE: 94 MMHG

## 2018-06-17 VITALS — DIASTOLIC BLOOD PRESSURE: 55 MMHG | SYSTOLIC BLOOD PRESSURE: 99 MMHG

## 2018-06-17 VITALS — SYSTOLIC BLOOD PRESSURE: 142 MMHG | DIASTOLIC BLOOD PRESSURE: 75 MMHG

## 2018-06-17 VITALS — DIASTOLIC BLOOD PRESSURE: 52 MMHG | SYSTOLIC BLOOD PRESSURE: 85 MMHG

## 2018-06-17 VITALS — DIASTOLIC BLOOD PRESSURE: 55 MMHG | SYSTOLIC BLOOD PRESSURE: 110 MMHG

## 2018-06-17 VITALS — SYSTOLIC BLOOD PRESSURE: 167 MMHG | DIASTOLIC BLOOD PRESSURE: 85 MMHG

## 2018-06-17 VITALS — SYSTOLIC BLOOD PRESSURE: 109 MMHG | DIASTOLIC BLOOD PRESSURE: 61 MMHG

## 2018-06-17 VITALS — SYSTOLIC BLOOD PRESSURE: 137 MMHG | DIASTOLIC BLOOD PRESSURE: 81 MMHG

## 2018-06-17 VITALS — SYSTOLIC BLOOD PRESSURE: 96 MMHG | DIASTOLIC BLOOD PRESSURE: 59 MMHG

## 2018-06-17 VITALS — SYSTOLIC BLOOD PRESSURE: 122 MMHG | DIASTOLIC BLOOD PRESSURE: 79 MMHG

## 2018-06-17 VITALS — SYSTOLIC BLOOD PRESSURE: 128 MMHG | DIASTOLIC BLOOD PRESSURE: 76 MMHG

## 2018-06-17 VITALS — DIASTOLIC BLOOD PRESSURE: 61 MMHG | SYSTOLIC BLOOD PRESSURE: 112 MMHG

## 2018-06-17 VITALS — SYSTOLIC BLOOD PRESSURE: 126 MMHG | DIASTOLIC BLOOD PRESSURE: 63 MMHG

## 2018-06-17 VITALS — SYSTOLIC BLOOD PRESSURE: 118 MMHG | DIASTOLIC BLOOD PRESSURE: 59 MMHG

## 2018-06-17 VITALS — DIASTOLIC BLOOD PRESSURE: 83 MMHG | SYSTOLIC BLOOD PRESSURE: 132 MMHG

## 2018-06-17 VITALS — SYSTOLIC BLOOD PRESSURE: 122 MMHG | DIASTOLIC BLOOD PRESSURE: 67 MMHG

## 2018-06-17 VITALS — DIASTOLIC BLOOD PRESSURE: 71 MMHG | SYSTOLIC BLOOD PRESSURE: 136 MMHG

## 2018-06-17 VITALS — DIASTOLIC BLOOD PRESSURE: 64 MMHG | SYSTOLIC BLOOD PRESSURE: 121 MMHG

## 2018-06-17 VITALS — DIASTOLIC BLOOD PRESSURE: 57 MMHG | SYSTOLIC BLOOD PRESSURE: 99 MMHG

## 2018-06-17 VITALS — DIASTOLIC BLOOD PRESSURE: 75 MMHG | SYSTOLIC BLOOD PRESSURE: 148 MMHG

## 2018-06-17 VITALS — DIASTOLIC BLOOD PRESSURE: 40 MMHG | SYSTOLIC BLOOD PRESSURE: 73 MMHG

## 2018-06-17 VITALS — SYSTOLIC BLOOD PRESSURE: 139 MMHG | DIASTOLIC BLOOD PRESSURE: 95 MMHG

## 2018-06-17 VITALS — DIASTOLIC BLOOD PRESSURE: 68 MMHG | SYSTOLIC BLOOD PRESSURE: 134 MMHG

## 2018-06-17 VITALS — SYSTOLIC BLOOD PRESSURE: 129 MMHG | DIASTOLIC BLOOD PRESSURE: 67 MMHG

## 2018-06-17 VITALS — SYSTOLIC BLOOD PRESSURE: 123 MMHG | DIASTOLIC BLOOD PRESSURE: 69 MMHG

## 2018-06-17 VITALS — DIASTOLIC BLOOD PRESSURE: 49 MMHG | SYSTOLIC BLOOD PRESSURE: 86 MMHG

## 2018-06-17 VITALS — DIASTOLIC BLOOD PRESSURE: 67 MMHG | SYSTOLIC BLOOD PRESSURE: 111 MMHG

## 2018-06-17 VITALS — DIASTOLIC BLOOD PRESSURE: 70 MMHG | SYSTOLIC BLOOD PRESSURE: 130 MMHG

## 2018-06-17 VITALS — SYSTOLIC BLOOD PRESSURE: 102 MMHG | DIASTOLIC BLOOD PRESSURE: 52 MMHG

## 2018-06-17 VITALS — SYSTOLIC BLOOD PRESSURE: 92 MMHG | DIASTOLIC BLOOD PRESSURE: 42 MMHG

## 2018-06-17 VITALS — DIASTOLIC BLOOD PRESSURE: 98 MMHG | SYSTOLIC BLOOD PRESSURE: 150 MMHG

## 2018-06-17 VITALS — SYSTOLIC BLOOD PRESSURE: 147 MMHG | DIASTOLIC BLOOD PRESSURE: 83 MMHG

## 2018-06-17 VITALS — SYSTOLIC BLOOD PRESSURE: 119 MMHG | DIASTOLIC BLOOD PRESSURE: 68 MMHG

## 2018-06-17 VITALS — SYSTOLIC BLOOD PRESSURE: 139 MMHG | DIASTOLIC BLOOD PRESSURE: 58 MMHG

## 2018-06-17 VITALS — SYSTOLIC BLOOD PRESSURE: 75 MMHG | DIASTOLIC BLOOD PRESSURE: 42 MMHG

## 2018-06-17 VITALS — DIASTOLIC BLOOD PRESSURE: 82 MMHG | SYSTOLIC BLOOD PRESSURE: 123 MMHG

## 2018-06-17 VITALS — SYSTOLIC BLOOD PRESSURE: 121 MMHG | DIASTOLIC BLOOD PRESSURE: 50 MMHG

## 2018-06-17 VITALS — DIASTOLIC BLOOD PRESSURE: 68 MMHG | SYSTOLIC BLOOD PRESSURE: 133 MMHG

## 2018-06-17 VITALS — SYSTOLIC BLOOD PRESSURE: 136 MMHG | DIASTOLIC BLOOD PRESSURE: 81 MMHG

## 2018-06-17 VITALS — SYSTOLIC BLOOD PRESSURE: 132 MMHG | DIASTOLIC BLOOD PRESSURE: 85 MMHG

## 2018-06-17 VITALS — DIASTOLIC BLOOD PRESSURE: 57 MMHG | SYSTOLIC BLOOD PRESSURE: 98 MMHG

## 2018-06-17 VITALS — DIASTOLIC BLOOD PRESSURE: 73 MMHG | SYSTOLIC BLOOD PRESSURE: 163 MMHG

## 2018-06-17 VITALS — DIASTOLIC BLOOD PRESSURE: 76 MMHG | SYSTOLIC BLOOD PRESSURE: 171 MMHG

## 2018-06-17 VITALS — SYSTOLIC BLOOD PRESSURE: 115 MMHG | DIASTOLIC BLOOD PRESSURE: 66 MMHG

## 2018-06-17 VITALS — SYSTOLIC BLOOD PRESSURE: 94 MMHG | DIASTOLIC BLOOD PRESSURE: 54 MMHG

## 2018-06-17 VITALS — SYSTOLIC BLOOD PRESSURE: 135 MMHG | DIASTOLIC BLOOD PRESSURE: 73 MMHG

## 2018-06-17 VITALS — DIASTOLIC BLOOD PRESSURE: 41 MMHG | SYSTOLIC BLOOD PRESSURE: 89 MMHG

## 2018-06-17 VITALS — SYSTOLIC BLOOD PRESSURE: 129 MMHG | DIASTOLIC BLOOD PRESSURE: 65 MMHG

## 2018-06-17 VITALS — DIASTOLIC BLOOD PRESSURE: 60 MMHG | SYSTOLIC BLOOD PRESSURE: 92 MMHG

## 2018-06-17 VITALS — DIASTOLIC BLOOD PRESSURE: 59 MMHG | SYSTOLIC BLOOD PRESSURE: 121 MMHG

## 2018-06-17 VITALS — SYSTOLIC BLOOD PRESSURE: 72 MMHG | DIASTOLIC BLOOD PRESSURE: 39 MMHG

## 2018-06-17 VITALS — SYSTOLIC BLOOD PRESSURE: 87 MMHG | DIASTOLIC BLOOD PRESSURE: 53 MMHG

## 2018-06-17 VITALS — SYSTOLIC BLOOD PRESSURE: 120 MMHG | DIASTOLIC BLOOD PRESSURE: 83 MMHG

## 2018-06-17 VITALS — DIASTOLIC BLOOD PRESSURE: 51 MMHG | SYSTOLIC BLOOD PRESSURE: 101 MMHG

## 2018-06-17 VITALS — DIASTOLIC BLOOD PRESSURE: 55 MMHG | SYSTOLIC BLOOD PRESSURE: 105 MMHG

## 2018-06-17 VITALS — SYSTOLIC BLOOD PRESSURE: 121 MMHG | DIASTOLIC BLOOD PRESSURE: 61 MMHG

## 2018-06-17 VITALS — DIASTOLIC BLOOD PRESSURE: 89 MMHG | SYSTOLIC BLOOD PRESSURE: 136 MMHG

## 2018-06-17 VITALS — SYSTOLIC BLOOD PRESSURE: 89 MMHG | DIASTOLIC BLOOD PRESSURE: 40 MMHG

## 2018-06-17 VITALS — DIASTOLIC BLOOD PRESSURE: 52 MMHG | SYSTOLIC BLOOD PRESSURE: 90 MMHG

## 2018-06-17 VITALS — DIASTOLIC BLOOD PRESSURE: 98 MMHG | SYSTOLIC BLOOD PRESSURE: 129 MMHG

## 2018-06-17 VITALS — DIASTOLIC BLOOD PRESSURE: 43 MMHG | SYSTOLIC BLOOD PRESSURE: 85 MMHG

## 2018-06-17 VITALS — DIASTOLIC BLOOD PRESSURE: 91 MMHG | SYSTOLIC BLOOD PRESSURE: 135 MMHG

## 2018-06-17 VITALS — DIASTOLIC BLOOD PRESSURE: 58 MMHG | SYSTOLIC BLOOD PRESSURE: 108 MMHG

## 2018-06-17 VITALS — DIASTOLIC BLOOD PRESSURE: 59 MMHG | SYSTOLIC BLOOD PRESSURE: 103 MMHG

## 2018-06-17 VITALS — SYSTOLIC BLOOD PRESSURE: 119 MMHG | DIASTOLIC BLOOD PRESSURE: 79 MMHG

## 2018-06-17 VITALS — SYSTOLIC BLOOD PRESSURE: 129 MMHG | DIASTOLIC BLOOD PRESSURE: 64 MMHG

## 2018-06-17 VITALS — DIASTOLIC BLOOD PRESSURE: 79 MMHG | SYSTOLIC BLOOD PRESSURE: 134 MMHG

## 2018-06-17 VITALS — SYSTOLIC BLOOD PRESSURE: 143 MMHG | DIASTOLIC BLOOD PRESSURE: 85 MMHG

## 2018-06-17 LAB
ALBUMIN SERPL-MCNC: 1.2 G/DL (ref 3.5–5)
ALBUMIN/GLOB SERPL: 0.3 {RATIO} (ref 0.8–2)
ALP SERPL-CCNC: 306 IU/L (ref 40–150)
ALT SERPL-CCNC: 19 IU/L (ref 0–55)
ANION GAP SERPL CALC-SCNC: 12.4 MMOL/L (ref 8–16)
ANION GAP SERPL CALC-SCNC: 13.7 MMOL/L (ref 8–16)
BASOPHILS # BLD AUTO: 0 10*3/UL (ref 0–0.1)
BASOPHILS # BLD AUTO: 0 10*3/UL (ref 0–0.1)
BASOPHILS NFR BLD AUTO: 0 % (ref 0–1)
BASOPHILS NFR BLD AUTO: 0.3 % (ref 0–1)
BUN SERPL-MCNC: 33 MG/DL (ref 7–26)
BUN SERPL-MCNC: 36 MG/DL (ref 7–26)
BUN/CREAT SERPL: 29 (ref 6–25)
BUN/CREAT SERPL: 32 (ref 6–25)
CALCIUM SERPL-MCNC: 12 MG/DL (ref 8.4–10.2)
CALCIUM SERPL-MCNC: 12.6 MG/DL (ref 8.4–10.2)
CHLORIDE SERPL-SCNC: 113 MMOL/L (ref 98–107)
CHLORIDE SERPL-SCNC: 114 MMOL/L (ref 98–107)
CO2 SERPL-SCNC: 25 MMOL/L (ref 22–29)
CO2 SERPL-SCNC: 27 MMOL/L (ref 22–29)
DEPRECATED NEUTROPHILS # BLD AUTO: 1.4 10*3/UL (ref 2.1–6.9)
DEPRECATED NEUTROPHILS # BLD AUTO: 2.1 10*3/UL (ref 2.1–6.9)
EGFRCR SERPLBLD CKD-EPI 2021: > 60 ML/MIN (ref 60–?)
EGFRCR SERPLBLD CKD-EPI 2021: > 60 ML/MIN (ref 60–?)
EOSINOPHIL # BLD AUTO: 0 10*3/UL (ref 0–0.4)
EOSINOPHIL # BLD AUTO: 0.1 10*3/UL (ref 0–0.4)
EOSINOPHIL NFR BLD AUTO: 1.6 % (ref 0–6)
EOSINOPHIL NFR BLD AUTO: 2 % (ref 0–6)
EOSINOPHIL NFR BLD MANUAL: 1 % (ref 0–7)
ERYTHROCYTE [DISTWIDTH] IN CORD BLOOD: 20.2 % (ref 11.7–14.4)
ERYTHROCYTE [DISTWIDTH] IN CORD BLOOD: 20.9 % (ref 11.7–14.4)
GLOBULIN PLAS-MCNC: 3.9 G/DL (ref 2.3–3.5)
GLUCOSE SERPLBLD-MCNC: 166 MG/DL (ref 74–118)
GLUCOSE SERPLBLD-MCNC: 168 MG/DL (ref 74–118)
HCT VFR BLD AUTO: 19.9 % (ref 38.2–49.6)
HCT VFR BLD AUTO: 25.8 % (ref 38.2–49.6)
HGB BLD-MCNC: 6.4 G/DL (ref 14–18)
HGB BLD-MCNC: 8.3 G/DL (ref 14–18)
LYMPHOCYTES # BLD: 0.1 10*3/UL (ref 1–3.2)
LYMPHOCYTES # BLD: 0.2 10*3/UL (ref 1–3.2)
LYMPHOCYTES NFR BLD AUTO: 5.1 % (ref 18–39.1)
LYMPHOCYTES NFR BLD AUTO: 6.9 % (ref 18–39.1)
LYMPHOCYTES NFR BLD MANUAL: 9 % (ref 19–48)
MAGNESIUM SERPL-MCNC: 1.7 MG/DL (ref 1.3–2.1)
MCH RBC QN AUTO: 30.2 PG (ref 28–32)
MCH RBC QN AUTO: 30.5 PG (ref 28–32)
MCHC RBC AUTO-ENTMCNC: 32.2 G/DL (ref 31–35)
MCHC RBC AUTO-ENTMCNC: 32.2 G/DL (ref 31–35)
MCV RBC AUTO: 93.8 FL (ref 81–99)
MCV RBC AUTO: 94.8 FL (ref 81–99)
MONOCYTES # BLD AUTO: 0.2 10*3/UL (ref 0.2–0.8)
MONOCYTES # BLD AUTO: 0.3 10*3/UL (ref 0.2–0.8)
MONOCYTES NFR BLD AUTO: 9 % (ref 4.4–11.3)
MONOCYTES NFR BLD AUTO: 9.5 % (ref 4.4–11.3)
MONOCYTES NFR BLD MANUAL: 8 % (ref 3.4–9)
NEUTS BAND NFR BLD MANUAL: 4 %
NEUTS SEG NFR BLD AUTO: 72.4 % (ref 38.7–80)
NEUTS SEG NFR BLD AUTO: 72.6 % (ref 38.7–80)
NEUTS SEG NFR BLD MANUAL: 78 % (ref 40–74)
PLAT MORPH BLD: NORMAL
PLATELET # BLD AUTO: 87 X10E3/UL (ref 140–360)
PLATELET # BLD AUTO: 98 X10E3/UL (ref 140–360)
PLATELET # BLD EST: (no result) 10*3/UL
POTASSIUM SERPL-SCNC: 3.4 MMOL/L (ref 3.5–5.1)
POTASSIUM SERPL-SCNC: 3.7 MMOL/L (ref 3.5–5.1)
RBC # BLD AUTO: 2.1 X10E6/UL (ref 4.3–5.7)
RBC # BLD AUTO: 2.75 X10E6/UL (ref 4.3–5.7)
RBC MORPH BLD: NORMAL
SODIUM SERPL-SCNC: 149 MMOL/L (ref 136–145)
SODIUM SERPL-SCNC: 149 MMOL/L (ref 136–145)

## 2018-06-17 PROCEDURE — 30233N1 TRANSFUSION OF NONAUTOLOGOUS RED BLOOD CELLS INTO PERIPHERAL VEIN, PERCUTANEOUS APPROACH: ICD-10-PCS | Performed by: INTERNAL MEDICINE

## 2018-06-17 RX ADMIN — MIDAZOLAM HYDROCHLORIDE SCH MLS/HR: 5 INJECTION INTRAMUSCULAR; INTRAVENOUS at 01:29

## 2018-06-17 RX ADMIN — SODIUM CHLORIDE SCH MLS/HR: 9 INJECTION, SOLUTION INTRAVENOUS at 10:40

## 2018-06-17 RX ADMIN — SODIUM CHLORIDE SCH MLS/HR: 900 INJECTION, SOLUTION INTRAVENOUS at 10:58

## 2018-06-17 RX ADMIN — PROPOFOL PRN MLS/HR: 10 INJECTION, EMULSION INTRAVENOUS at 12:05

## 2018-06-17 RX ADMIN — SODIUM CHLORIDE SCH MLS/HR: 9 INJECTION, SOLUTION INTRAVENOUS at 04:00

## 2018-06-17 RX ADMIN — SODIUM CHLORIDE PRN MLS/HR: 9 INJECTION, SOLUTION INTRAVENOUS at 04:13

## 2018-06-17 RX ADMIN — PROPOFOL PRN MLS/HR: 10 INJECTION, EMULSION INTRAVENOUS at 19:50

## 2018-06-17 RX ADMIN — MIDAZOLAM HYDROCHLORIDE SCH MLS/HR: 5 INJECTION INTRAMUSCULAR; INTRAVENOUS at 13:14

## 2018-06-17 RX ADMIN — SODIUM CHLORIDE SCH MLS/HR: 900 INJECTION, SOLUTION INTRAVENOUS at 04:12

## 2018-06-17 RX ADMIN — METRONIDAZOLE SCH MLS/HR: 500 INJECTION, SOLUTION INTRAVENOUS at 05:13

## 2018-06-17 RX ADMIN — Medication SCH GM: at 20:36

## 2018-06-17 RX ADMIN — SODIUM CHLORIDE PRN MLS/HR: 9 INJECTION, SOLUTION INTRAVENOUS at 22:31

## 2018-06-17 RX ADMIN — SODIUM CHLORIDE SCH MLS/HR: 900 INJECTION, SOLUTION INTRAVENOUS at 23:52

## 2018-06-17 RX ADMIN — FLUCONAZOLE IN SODIUM CHLORIDE SCH MLS/HR: 2 INJECTION, SOLUTION INTRAVENOUS at 20:33

## 2018-06-17 RX ADMIN — SODIUM CHLORIDE SCH ML: 900 IRRIGANT IRRIGATION at 23:52

## 2018-06-17 RX ADMIN — MIDAZOLAM HYDROCHLORIDE SCH MLS/HR: 5 INJECTION INTRAMUSCULAR; INTRAVENOUS at 18:40

## 2018-06-17 RX ADMIN — FUROSEMIDE SCH MG: 10 INJECTION, SOLUTION INTRAMUSCULAR; INTRAVENOUS at 10:37

## 2018-06-17 RX ADMIN — MIDAZOLAM HYDROCHLORIDE SCH MLS/HR: 5 INJECTION INTRAMUSCULAR; INTRAVENOUS at 17:15

## 2018-06-17 RX ADMIN — MIDAZOLAM HYDROCHLORIDE SCH MLS/HR: 5 INJECTION INTRAMUSCULAR; INTRAVENOUS at 04:10

## 2018-06-17 RX ADMIN — SODIUM CHLORIDE SCH ML: 900 IRRIGANT IRRIGATION at 13:14

## 2018-06-17 RX ADMIN — SODIUM CHLORIDE SCH GM: 9 INJECTION, SOLUTION INTRAVENOUS at 20:33

## 2018-06-17 RX ADMIN — SODIUM CHLORIDE PRN MLS/HR: 9 INJECTION, SOLUTION INTRAVENOUS at 17:50

## 2018-06-17 RX ADMIN — SODIUM CHLORIDE SCH ML: 900 IRRIGANT IRRIGATION at 03:31

## 2018-06-17 RX ADMIN — DEXTROSE MONOHYDRATE SCH MLS/HR: 5 INJECTION INTRAVENOUS at 19:47

## 2018-06-17 RX ADMIN — MIDAZOLAM HYDROCHLORIDE SCH MLS/HR: 5 INJECTION INTRAMUSCULAR; INTRAVENOUS at 09:46

## 2018-06-17 RX ADMIN — Medication SCH MLS/HR: at 11:00

## 2018-06-17 RX ADMIN — SODIUM CHLORIDE SCH ML: 900 IRRIGANT IRRIGATION at 15:42

## 2018-06-17 RX ADMIN — SODIUM CHLORIDE SCH MG: 900 INJECTION INTRAVENOUS at 15:42

## 2018-06-17 RX ADMIN — SODIUM CHLORIDE SCH ML: 900 IRRIGANT IRRIGATION at 07:45

## 2018-06-17 RX ADMIN — METRONIDAZOLE SCH MLS/HR: 500 INJECTION, SOLUTION INTRAVENOUS at 21:42

## 2018-06-17 RX ADMIN — SODIUM CHLORIDE SCH ML: 900 IRRIGANT IRRIGATION at 20:33

## 2018-06-17 RX ADMIN — VANCOMYCIN HYDROCHLORIDE SCH MLS/HR: 1 INJECTION, SOLUTION INTRAVENOUS at 15:42

## 2018-06-17 RX ADMIN — METRONIDAZOLE SCH MLS/HR: 500 INJECTION, SOLUTION INTRAVENOUS at 14:37

## 2018-06-17 RX ADMIN — Medication SCH GM: at 08:45

## 2018-06-17 RX ADMIN — MIDAZOLAM HYDROCHLORIDE SCH MLS/HR: 5 INJECTION INTRAMUSCULAR; INTRAVENOUS at 21:05

## 2018-06-17 RX ADMIN — MIDAZOLAM HYDROCHLORIDE SCH MLS/HR: 5 INJECTION INTRAMUSCULAR; INTRAVENOUS at 00:42

## 2018-06-17 RX ADMIN — MIDAZOLAM HYDROCHLORIDE SCH MLS/HR: 5 INJECTION INTRAMUSCULAR; INTRAVENOUS at 23:52

## 2018-06-17 RX ADMIN — SODIUM CHLORIDE PRN MLS/HR: 9 INJECTION, SOLUTION INTRAVENOUS at 13:14

## 2018-06-17 RX ADMIN — SODIUM CHLORIDE SCH MLS/HR: 900 INJECTION, SOLUTION INTRAVENOUS at 17:25

## 2018-06-17 RX ADMIN — SODIUM CHLORIDE PRN MLS/HR: 9 INJECTION, SOLUTION INTRAVENOUS at 10:58

## 2018-06-17 NOTE — DIAGNOSTIC IMAGING REPORT
EXAM: CHEST SINGLE (PORTABLE), AP 1 view

INDICATION: Bibasilar atelectasis

COMPARISON: AP view of the chest Sowmya 15, 2018



FINDINGS:

LINES/TUBES: Stable position of endotracheal tube, left subclavian chest port,

right internal jugular vein and nasal/orogastric tube.



LUNGS: Persistent bibasilar atelectasis. 



PLEURA: Persistent bilateral pleural effusions.



HEART AND MEDIASTINUM: Stable



BONES AND SOFT TISSUES: No acute findings. 



IMPRESSION:

No interval change.







Signed by: Dr. Reny Gil M.D. on 6/17/2018 5:58 AM

## 2018-06-17 NOTE — DIAGNOSTIC IMAGING REPORT
EXAM:  US CHEST (INCL MEDIASTINUM)

DATE: 6/16/2018 12:00 AM  Time stamp on exam: 1737 hours

INDICATION: Effusion 

COMPARISON: Chest x-ray on 6/17/2018



FINDINGS:



Right thoracoabdominal junction and left thoracoabdominal junction demonstrate

small right pleural effusion and moderate left pleural effusion with deepest

pocket of fluid from the rib cage measuring approximately 10 cm at the left

lung base.



IMPRESSION: Bilateral pleural effusions left greater than right.









Signed by: Dr. Ray Martinez M.D. on 6/17/2018 6:08 PM

## 2018-06-18 VITALS — DIASTOLIC BLOOD PRESSURE: 42 MMHG | SYSTOLIC BLOOD PRESSURE: 73 MMHG

## 2018-06-18 VITALS — SYSTOLIC BLOOD PRESSURE: 105 MMHG | DIASTOLIC BLOOD PRESSURE: 62 MMHG

## 2018-06-18 VITALS — SYSTOLIC BLOOD PRESSURE: 149 MMHG | DIASTOLIC BLOOD PRESSURE: 89 MMHG

## 2018-06-18 VITALS — SYSTOLIC BLOOD PRESSURE: 144 MMHG | DIASTOLIC BLOOD PRESSURE: 94 MMHG

## 2018-06-18 VITALS — SYSTOLIC BLOOD PRESSURE: 142 MMHG | DIASTOLIC BLOOD PRESSURE: 81 MMHG

## 2018-06-18 VITALS — SYSTOLIC BLOOD PRESSURE: 126 MMHG | DIASTOLIC BLOOD PRESSURE: 75 MMHG

## 2018-06-18 VITALS — SYSTOLIC BLOOD PRESSURE: 137 MMHG | DIASTOLIC BLOOD PRESSURE: 87 MMHG

## 2018-06-18 VITALS — DIASTOLIC BLOOD PRESSURE: 70 MMHG | SYSTOLIC BLOOD PRESSURE: 147 MMHG

## 2018-06-18 VITALS — DIASTOLIC BLOOD PRESSURE: 82 MMHG | SYSTOLIC BLOOD PRESSURE: 131 MMHG

## 2018-06-18 VITALS — DIASTOLIC BLOOD PRESSURE: 89 MMHG | SYSTOLIC BLOOD PRESSURE: 138 MMHG

## 2018-06-18 VITALS — SYSTOLIC BLOOD PRESSURE: 126 MMHG | DIASTOLIC BLOOD PRESSURE: 80 MMHG

## 2018-06-18 VITALS — DIASTOLIC BLOOD PRESSURE: 52 MMHG | SYSTOLIC BLOOD PRESSURE: 100 MMHG

## 2018-06-18 VITALS — SYSTOLIC BLOOD PRESSURE: 72 MMHG | DIASTOLIC BLOOD PRESSURE: 46 MMHG

## 2018-06-18 VITALS — DIASTOLIC BLOOD PRESSURE: 92 MMHG | SYSTOLIC BLOOD PRESSURE: 143 MMHG

## 2018-06-18 VITALS — SYSTOLIC BLOOD PRESSURE: 106 MMHG | DIASTOLIC BLOOD PRESSURE: 61 MMHG

## 2018-06-18 VITALS — SYSTOLIC BLOOD PRESSURE: 125 MMHG | DIASTOLIC BLOOD PRESSURE: 84 MMHG

## 2018-06-18 VITALS — SYSTOLIC BLOOD PRESSURE: 84 MMHG | DIASTOLIC BLOOD PRESSURE: 41 MMHG

## 2018-06-18 VITALS — DIASTOLIC BLOOD PRESSURE: 76 MMHG | SYSTOLIC BLOOD PRESSURE: 120 MMHG

## 2018-06-18 VITALS — SYSTOLIC BLOOD PRESSURE: 81 MMHG | DIASTOLIC BLOOD PRESSURE: 46 MMHG

## 2018-06-18 VITALS — DIASTOLIC BLOOD PRESSURE: 59 MMHG | SYSTOLIC BLOOD PRESSURE: 91 MMHG

## 2018-06-18 VITALS — DIASTOLIC BLOOD PRESSURE: 64 MMHG | SYSTOLIC BLOOD PRESSURE: 121 MMHG

## 2018-06-18 VITALS — SYSTOLIC BLOOD PRESSURE: 109 MMHG | DIASTOLIC BLOOD PRESSURE: 80 MMHG

## 2018-06-18 VITALS — DIASTOLIC BLOOD PRESSURE: 69 MMHG | SYSTOLIC BLOOD PRESSURE: 128 MMHG

## 2018-06-18 VITALS — SYSTOLIC BLOOD PRESSURE: 114 MMHG | DIASTOLIC BLOOD PRESSURE: 78 MMHG

## 2018-06-18 VITALS — DIASTOLIC BLOOD PRESSURE: 72 MMHG | SYSTOLIC BLOOD PRESSURE: 141 MMHG

## 2018-06-18 VITALS — DIASTOLIC BLOOD PRESSURE: 103 MMHG | SYSTOLIC BLOOD PRESSURE: 144 MMHG

## 2018-06-18 VITALS — SYSTOLIC BLOOD PRESSURE: 103 MMHG | DIASTOLIC BLOOD PRESSURE: 49 MMHG

## 2018-06-18 VITALS — DIASTOLIC BLOOD PRESSURE: 95 MMHG | SYSTOLIC BLOOD PRESSURE: 120 MMHG

## 2018-06-18 VITALS — SYSTOLIC BLOOD PRESSURE: 102 MMHG | DIASTOLIC BLOOD PRESSURE: 63 MMHG

## 2018-06-18 VITALS — SYSTOLIC BLOOD PRESSURE: 115 MMHG | DIASTOLIC BLOOD PRESSURE: 60 MMHG

## 2018-06-18 VITALS — SYSTOLIC BLOOD PRESSURE: 117 MMHG | DIASTOLIC BLOOD PRESSURE: 73 MMHG

## 2018-06-18 VITALS — DIASTOLIC BLOOD PRESSURE: 90 MMHG | SYSTOLIC BLOOD PRESSURE: 137 MMHG

## 2018-06-18 VITALS — DIASTOLIC BLOOD PRESSURE: 48 MMHG | SYSTOLIC BLOOD PRESSURE: 72 MMHG

## 2018-06-18 VITALS — DIASTOLIC BLOOD PRESSURE: 61 MMHG | SYSTOLIC BLOOD PRESSURE: 119 MMHG

## 2018-06-18 VITALS — DIASTOLIC BLOOD PRESSURE: 53 MMHG | SYSTOLIC BLOOD PRESSURE: 99 MMHG

## 2018-06-18 VITALS — DIASTOLIC BLOOD PRESSURE: 84 MMHG | SYSTOLIC BLOOD PRESSURE: 136 MMHG

## 2018-06-18 VITALS — DIASTOLIC BLOOD PRESSURE: 81 MMHG | SYSTOLIC BLOOD PRESSURE: 135 MMHG

## 2018-06-18 VITALS — DIASTOLIC BLOOD PRESSURE: 87 MMHG | SYSTOLIC BLOOD PRESSURE: 135 MMHG

## 2018-06-18 VITALS — SYSTOLIC BLOOD PRESSURE: 129 MMHG | DIASTOLIC BLOOD PRESSURE: 82 MMHG

## 2018-06-18 VITALS — SYSTOLIC BLOOD PRESSURE: 129 MMHG | DIASTOLIC BLOOD PRESSURE: 68 MMHG

## 2018-06-18 VITALS — SYSTOLIC BLOOD PRESSURE: 97 MMHG | DIASTOLIC BLOOD PRESSURE: 65 MMHG

## 2018-06-18 VITALS — SYSTOLIC BLOOD PRESSURE: 75 MMHG | DIASTOLIC BLOOD PRESSURE: 40 MMHG

## 2018-06-18 VITALS — DIASTOLIC BLOOD PRESSURE: 86 MMHG | SYSTOLIC BLOOD PRESSURE: 135 MMHG

## 2018-06-18 VITALS — SYSTOLIC BLOOD PRESSURE: 94 MMHG | DIASTOLIC BLOOD PRESSURE: 58 MMHG

## 2018-06-18 VITALS — SYSTOLIC BLOOD PRESSURE: 136 MMHG | DIASTOLIC BLOOD PRESSURE: 92 MMHG

## 2018-06-18 VITALS — DIASTOLIC BLOOD PRESSURE: 46 MMHG | SYSTOLIC BLOOD PRESSURE: 79 MMHG

## 2018-06-18 VITALS — DIASTOLIC BLOOD PRESSURE: 75 MMHG | SYSTOLIC BLOOD PRESSURE: 141 MMHG

## 2018-06-18 VITALS — SYSTOLIC BLOOD PRESSURE: 142 MMHG | DIASTOLIC BLOOD PRESSURE: 76 MMHG

## 2018-06-18 VITALS — DIASTOLIC BLOOD PRESSURE: 56 MMHG | SYSTOLIC BLOOD PRESSURE: 128 MMHG

## 2018-06-18 VITALS — SYSTOLIC BLOOD PRESSURE: 129 MMHG | DIASTOLIC BLOOD PRESSURE: 80 MMHG

## 2018-06-18 VITALS — SYSTOLIC BLOOD PRESSURE: 133 MMHG | DIASTOLIC BLOOD PRESSURE: 63 MMHG

## 2018-06-18 VITALS — DIASTOLIC BLOOD PRESSURE: 67 MMHG | SYSTOLIC BLOOD PRESSURE: 104 MMHG

## 2018-06-18 VITALS — DIASTOLIC BLOOD PRESSURE: 86 MMHG | SYSTOLIC BLOOD PRESSURE: 133 MMHG

## 2018-06-18 VITALS — DIASTOLIC BLOOD PRESSURE: 77 MMHG | SYSTOLIC BLOOD PRESSURE: 120 MMHG

## 2018-06-18 VITALS — SYSTOLIC BLOOD PRESSURE: 139 MMHG | DIASTOLIC BLOOD PRESSURE: 72 MMHG

## 2018-06-18 VITALS — DIASTOLIC BLOOD PRESSURE: 86 MMHG | SYSTOLIC BLOOD PRESSURE: 121 MMHG

## 2018-06-18 VITALS — SYSTOLIC BLOOD PRESSURE: 126 MMHG | DIASTOLIC BLOOD PRESSURE: 66 MMHG

## 2018-06-18 VITALS — DIASTOLIC BLOOD PRESSURE: 66 MMHG | SYSTOLIC BLOOD PRESSURE: 114 MMHG

## 2018-06-18 VITALS — SYSTOLIC BLOOD PRESSURE: 141 MMHG | DIASTOLIC BLOOD PRESSURE: 87 MMHG

## 2018-06-18 VITALS — SYSTOLIC BLOOD PRESSURE: 102 MMHG | DIASTOLIC BLOOD PRESSURE: 61 MMHG

## 2018-06-18 VITALS — SYSTOLIC BLOOD PRESSURE: 99 MMHG | DIASTOLIC BLOOD PRESSURE: 78 MMHG

## 2018-06-18 VITALS — DIASTOLIC BLOOD PRESSURE: 69 MMHG | SYSTOLIC BLOOD PRESSURE: 112 MMHG

## 2018-06-18 VITALS — DIASTOLIC BLOOD PRESSURE: 54 MMHG | SYSTOLIC BLOOD PRESSURE: 104 MMHG

## 2018-06-18 VITALS — DIASTOLIC BLOOD PRESSURE: 55 MMHG | SYSTOLIC BLOOD PRESSURE: 79 MMHG

## 2018-06-18 VITALS — DIASTOLIC BLOOD PRESSURE: 97 MMHG | SYSTOLIC BLOOD PRESSURE: 158 MMHG

## 2018-06-18 VITALS — SYSTOLIC BLOOD PRESSURE: 157 MMHG | DIASTOLIC BLOOD PRESSURE: 88 MMHG

## 2018-06-18 VITALS — DIASTOLIC BLOOD PRESSURE: 81 MMHG | SYSTOLIC BLOOD PRESSURE: 130 MMHG

## 2018-06-18 VITALS — DIASTOLIC BLOOD PRESSURE: 62 MMHG | SYSTOLIC BLOOD PRESSURE: 100 MMHG

## 2018-06-18 VITALS — DIASTOLIC BLOOD PRESSURE: 76 MMHG | SYSTOLIC BLOOD PRESSURE: 129 MMHG

## 2018-06-18 VITALS — SYSTOLIC BLOOD PRESSURE: 116 MMHG | DIASTOLIC BLOOD PRESSURE: 80 MMHG

## 2018-06-18 VITALS — DIASTOLIC BLOOD PRESSURE: 80 MMHG | SYSTOLIC BLOOD PRESSURE: 136 MMHG

## 2018-06-18 VITALS — SYSTOLIC BLOOD PRESSURE: 131 MMHG | DIASTOLIC BLOOD PRESSURE: 89 MMHG

## 2018-06-18 VITALS — DIASTOLIC BLOOD PRESSURE: 51 MMHG | SYSTOLIC BLOOD PRESSURE: 84 MMHG

## 2018-06-18 VITALS — SYSTOLIC BLOOD PRESSURE: 130 MMHG | DIASTOLIC BLOOD PRESSURE: 67 MMHG

## 2018-06-18 VITALS — SYSTOLIC BLOOD PRESSURE: 153 MMHG | DIASTOLIC BLOOD PRESSURE: 80 MMHG

## 2018-06-18 VITALS — DIASTOLIC BLOOD PRESSURE: 73 MMHG | SYSTOLIC BLOOD PRESSURE: 119 MMHG

## 2018-06-18 VITALS — DIASTOLIC BLOOD PRESSURE: 80 MMHG | SYSTOLIC BLOOD PRESSURE: 129 MMHG

## 2018-06-18 VITALS — DIASTOLIC BLOOD PRESSURE: 84 MMHG | SYSTOLIC BLOOD PRESSURE: 145 MMHG

## 2018-06-18 VITALS — DIASTOLIC BLOOD PRESSURE: 62 MMHG | SYSTOLIC BLOOD PRESSURE: 125 MMHG

## 2018-06-18 VITALS — DIASTOLIC BLOOD PRESSURE: 47 MMHG | SYSTOLIC BLOOD PRESSURE: 103 MMHG

## 2018-06-18 VITALS — DIASTOLIC BLOOD PRESSURE: 56 MMHG | SYSTOLIC BLOOD PRESSURE: 104 MMHG

## 2018-06-18 VITALS — DIASTOLIC BLOOD PRESSURE: 58 MMHG | SYSTOLIC BLOOD PRESSURE: 109 MMHG

## 2018-06-18 VITALS — DIASTOLIC BLOOD PRESSURE: 61 MMHG | SYSTOLIC BLOOD PRESSURE: 130 MMHG

## 2018-06-18 VITALS — DIASTOLIC BLOOD PRESSURE: 85 MMHG | SYSTOLIC BLOOD PRESSURE: 127 MMHG

## 2018-06-18 VITALS — DIASTOLIC BLOOD PRESSURE: 62 MMHG | SYSTOLIC BLOOD PRESSURE: 96 MMHG

## 2018-06-18 VITALS — SYSTOLIC BLOOD PRESSURE: 82 MMHG | DIASTOLIC BLOOD PRESSURE: 49 MMHG

## 2018-06-18 VITALS — SYSTOLIC BLOOD PRESSURE: 155 MMHG | DIASTOLIC BLOOD PRESSURE: 92 MMHG

## 2018-06-18 VITALS — SYSTOLIC BLOOD PRESSURE: 97 MMHG | DIASTOLIC BLOOD PRESSURE: 54 MMHG

## 2018-06-18 VITALS — SYSTOLIC BLOOD PRESSURE: 114 MMHG | DIASTOLIC BLOOD PRESSURE: 53 MMHG

## 2018-06-18 LAB
ALBUMIN SERPL-MCNC: 1.3 G/DL (ref 3.5–5)
ALBUMIN/GLOB SERPL: 0.3 {RATIO} (ref 0.8–2)
ALP SERPL-CCNC: 301 IU/L (ref 40–150)
ALT SERPL-CCNC: 20 IU/L (ref 0–55)
ANION GAP SERPL CALC-SCNC: 14.9 MMOL/L (ref 8–16)
ANISOCYTOSIS BLD QL SMEAR: SLIGHT
BASOPHILS # BLD AUTO: 0 10*3/UL (ref 0–0.1)
BASOPHILS NFR BLD AUTO: 0.3 % (ref 0–1)
BUN SERPL-MCNC: 42 MG/DL (ref 7–26)
BUN/CREAT SERPL: 35 (ref 6–25)
CALCIUM SERPL-MCNC: 13.1 MG/DL (ref 8.4–10.2)
CHLORIDE SERPL-SCNC: 116 MMOL/L (ref 98–107)
CO2 SERPL-SCNC: 25 MMOL/L (ref 22–29)
DEPRECATED NEUTROPHILS # BLD AUTO: 2.1 10*3/UL (ref 2.1–6.9)
EGFRCR SERPLBLD CKD-EPI 2021: > 60 ML/MIN (ref 60–?)
EOSINOPHIL # BLD AUTO: 0.1 10*3/UL (ref 0–0.4)
EOSINOPHIL NFR BLD AUTO: 2.1 % (ref 0–6)
EOSINOPHIL NFR BLD MANUAL: 1 % (ref 0–7)
ERYTHROCYTE [DISTWIDTH] IN CORD BLOOD: 20.6 % (ref 11.7–14.4)
GLOBULIN PLAS-MCNC: 4.1 G/DL (ref 2.3–3.5)
GLUCOSE SERPLBLD-MCNC: 166 MG/DL (ref 74–118)
HCT VFR BLD AUTO: 25 % (ref 38.2–49.6)
HGB BLD-MCNC: 7.9 G/DL (ref 14–18)
HYPOCHROMIA BLD QL SMEAR: SLIGHT
LYMPHOCYTES # BLD: 0.1 10*3/UL (ref 1–3.2)
LYMPHOCYTES NFR BLD AUTO: 4.1 % (ref 18–39.1)
LYMPHOCYTES NFR BLD MANUAL: 6 % (ref 19–48)
MCH RBC QN AUTO: 29.7 PG (ref 28–32)
MCHC RBC AUTO-ENTMCNC: 31.6 G/DL (ref 31–35)
MCV RBC AUTO: 94 FL (ref 81–99)
METAMYELOCYTES NFR BLD MANUAL: 5 % (ref 0–0)
MONOCYTES # BLD AUTO: 0.2 10*3/UL (ref 0.2–0.8)
MONOCYTES NFR BLD AUTO: 8.3 % (ref 4.4–11.3)
MONOCYTES NFR BLD MANUAL: 5 % (ref 3.4–9)
MYELOCYTES NFR BLD MANUAL: 3 % (ref 0–0)
NEUTS BAND NFR BLD MANUAL: 5 %
NEUTS SEG NFR BLD AUTO: 72.1 % (ref 38.7–80)
NEUTS SEG NFR BLD MANUAL: 76 % (ref 40–74)
PLAT MORPH BLD: NORMAL
PLATELET # BLD AUTO: 95 X10E3/UL (ref 140–360)
PLATELET # BLD EST: (no result) 10*3/UL
POTASSIUM SERPL-SCNC: 3.9 MMOL/L (ref 3.5–5.1)
RBC # BLD AUTO: 2.66 X10E6/UL (ref 4.3–5.7)
RBC MORPH BLD: NORMAL
SODIUM SERPL-SCNC: 152 MMOL/L (ref 136–145)

## 2018-06-18 RX ADMIN — LORAZEPAM PRN MG: 2 INJECTION INTRAMUSCULAR; INTRAVENOUS at 13:18

## 2018-06-18 RX ADMIN — PROPOFOL PRN MLS/HR: 10 INJECTION, EMULSION INTRAVENOUS at 04:45

## 2018-06-18 RX ADMIN — METRONIDAZOLE SCH MLS/HR: 500 INJECTION, SOLUTION INTRAVENOUS at 21:02

## 2018-06-18 RX ADMIN — Medication SCH MLS/HR: at 10:36

## 2018-06-18 RX ADMIN — Medication PRN MG: at 15:06

## 2018-06-18 RX ADMIN — SODIUM CHLORIDE SCH ML: 900 IRRIGANT IRRIGATION at 04:45

## 2018-06-18 RX ADMIN — Medication SCH GM: at 10:08

## 2018-06-18 RX ADMIN — LORAZEPAM PRN MG: 2 INJECTION INTRAMUSCULAR; INTRAVENOUS at 15:45

## 2018-06-18 RX ADMIN — METRONIDAZOLE SCH MLS/HR: 500 INJECTION, SOLUTION INTRAVENOUS at 05:22

## 2018-06-18 RX ADMIN — SODIUM CHLORIDE SCH ML: 900 IRRIGANT IRRIGATION at 14:33

## 2018-06-18 RX ADMIN — ZIPRASIDONE MESYLATE PRN MG: 20 INJECTION, POWDER, LYOPHILIZED, FOR SOLUTION INTRAMUSCULAR at 20:13

## 2018-06-18 RX ADMIN — SODIUM CHLORIDE SCH ML: 900 IRRIGANT IRRIGATION at 17:02

## 2018-06-18 RX ADMIN — Medication PRN MG: at 13:30

## 2018-06-18 RX ADMIN — PROPOFOL PRN MLS/HR: 10 INJECTION, EMULSION INTRAVENOUS at 01:20

## 2018-06-18 RX ADMIN — SODIUM CHLORIDE SCH MG: 900 INJECTION INTRAVENOUS at 18:13

## 2018-06-18 RX ADMIN — SODIUM CHLORIDE SCH GM: 9 INJECTION, SOLUTION INTRAVENOUS at 21:02

## 2018-06-18 RX ADMIN — DEXTROSE MONOHYDRATE SCH MLS/HR: 5 INJECTION INTRAVENOUS at 17:55

## 2018-06-18 RX ADMIN — Medication SCH GM: at 20:11

## 2018-06-18 RX ADMIN — SODIUM CHLORIDE PRN MLS/HR: 9 INJECTION, SOLUTION INTRAVENOUS at 08:59

## 2018-06-18 RX ADMIN — Medication PRN MG: at 20:14

## 2018-06-18 RX ADMIN — SODIUM CHLORIDE PRN MLS/HR: 9 INJECTION, SOLUTION INTRAVENOUS at 01:43

## 2018-06-18 RX ADMIN — METRONIDAZOLE SCH MLS/HR: 500 INJECTION, SOLUTION INTRAVENOUS at 14:39

## 2018-06-18 RX ADMIN — VANCOMYCIN HYDROCHLORIDE SCH MLS/HR: 1 INJECTION, SOLUTION INTRAVENOUS at 18:13

## 2018-06-18 RX ADMIN — Medication PRN MG: at 21:45

## 2018-06-18 RX ADMIN — SODIUM CHLORIDE SCH ML: 900 IRRIGANT IRRIGATION at 20:11

## 2018-06-18 RX ADMIN — SODIUM CHLORIDE SCH ML: 900 IRRIGANT IRRIGATION at 07:48

## 2018-06-18 RX ADMIN — PROPOFOL PRN MLS/HR: 10 INJECTION, EMULSION INTRAVENOUS at 09:30

## 2018-06-18 RX ADMIN — FLUCONAZOLE IN SODIUM CHLORIDE SCH MLS/HR: 2 INJECTION, SOLUTION INTRAVENOUS at 20:11

## 2018-06-18 RX ADMIN — PROMETHAZINE HYDROCHLORIDE PRN MG: 25 INJECTION, SOLUTION INTRAMUSCULAR; INTRAVENOUS at 20:14

## 2018-06-18 NOTE — DIAGNOSTIC IMAGING REPORT
EXAM: CHEST SINGLE (PORTABLE), AP 1 view

INDICATION: Ventilated

COMPARISON: AP view of the chest June 17, 2018



FINDINGS:

LINES/TUBES: Stable support lines and tubes



LUNGS: Bibasilar atelectasis. 



PLEURA: Small right and enlarging left pleural effusion.



HEART AND MEDIASTINUM: Stable appearance



BONES AND SOFT TISSUES: No acute findings. 



IMPRESSION:

Increasing left pleural effusion, otherwise no significant interval change.







Signed by: Dr. Reny Gil M.D. on 6/18/2018 6:24 AM

## 2018-06-19 VITALS — DIASTOLIC BLOOD PRESSURE: 76 MMHG | SYSTOLIC BLOOD PRESSURE: 123 MMHG

## 2018-06-19 VITALS — DIASTOLIC BLOOD PRESSURE: 74 MMHG | SYSTOLIC BLOOD PRESSURE: 118 MMHG

## 2018-06-19 VITALS — DIASTOLIC BLOOD PRESSURE: 86 MMHG | SYSTOLIC BLOOD PRESSURE: 130 MMHG

## 2018-06-19 VITALS — SYSTOLIC BLOOD PRESSURE: 107 MMHG | DIASTOLIC BLOOD PRESSURE: 89 MMHG

## 2018-06-19 VITALS — SYSTOLIC BLOOD PRESSURE: 105 MMHG | DIASTOLIC BLOOD PRESSURE: 58 MMHG

## 2018-06-19 VITALS — SYSTOLIC BLOOD PRESSURE: 120 MMHG | DIASTOLIC BLOOD PRESSURE: 79 MMHG

## 2018-06-19 VITALS — DIASTOLIC BLOOD PRESSURE: 76 MMHG | SYSTOLIC BLOOD PRESSURE: 114 MMHG

## 2018-06-19 VITALS — SYSTOLIC BLOOD PRESSURE: 111 MMHG | DIASTOLIC BLOOD PRESSURE: 66 MMHG

## 2018-06-19 VITALS — SYSTOLIC BLOOD PRESSURE: 106 MMHG | DIASTOLIC BLOOD PRESSURE: 67 MMHG

## 2018-06-19 VITALS — DIASTOLIC BLOOD PRESSURE: 73 MMHG | SYSTOLIC BLOOD PRESSURE: 131 MMHG

## 2018-06-19 VITALS — DIASTOLIC BLOOD PRESSURE: 71 MMHG | SYSTOLIC BLOOD PRESSURE: 119 MMHG

## 2018-06-19 VITALS — SYSTOLIC BLOOD PRESSURE: 118 MMHG | DIASTOLIC BLOOD PRESSURE: 74 MMHG

## 2018-06-19 VITALS — DIASTOLIC BLOOD PRESSURE: 66 MMHG | SYSTOLIC BLOOD PRESSURE: 115 MMHG

## 2018-06-19 VITALS — DIASTOLIC BLOOD PRESSURE: 81 MMHG | SYSTOLIC BLOOD PRESSURE: 128 MMHG

## 2018-06-19 VITALS — DIASTOLIC BLOOD PRESSURE: 72 MMHG | SYSTOLIC BLOOD PRESSURE: 115 MMHG

## 2018-06-19 VITALS — SYSTOLIC BLOOD PRESSURE: 123 MMHG | DIASTOLIC BLOOD PRESSURE: 73 MMHG

## 2018-06-19 RX ADMIN — LORAZEPAM PRN MG: 2 INJECTION INTRAMUSCULAR; INTRAVENOUS at 00:24

## 2018-06-19 RX ADMIN — Medication PRN MG: at 19:09

## 2018-06-19 RX ADMIN — Medication PRN MG: at 11:38

## 2018-06-19 RX ADMIN — SODIUM CHLORIDE SCH ML: 900 IRRIGANT IRRIGATION at 00:23

## 2018-06-19 RX ADMIN — SODIUM CHLORIDE SCH ML: 900 IRRIGANT IRRIGATION at 02:19

## 2018-06-19 RX ADMIN — Medication PRN MG: at 13:50

## 2018-06-19 RX ADMIN — Medication PRN MG: at 16:07

## 2018-06-19 RX ADMIN — PROMETHAZINE HYDROCHLORIDE PRN MG: 25 INJECTION, SOLUTION INTRAMUSCULAR; INTRAVENOUS at 00:25

## 2018-06-19 RX ADMIN — Medication PRN MG: at 01:35

## 2018-06-19 RX ADMIN — Medication PRN MG: at 17:40

## 2018-06-19 RX ADMIN — Medication SCH MLS/HR: at 09:47

## 2018-06-19 RX ADMIN — SODIUM CHLORIDE PRN MLS/HR: 9 INJECTION, SOLUTION INTRAVENOUS at 08:19

## 2018-06-19 RX ADMIN — Medication PRN MG: at 23:21

## 2018-06-19 RX ADMIN — LORAZEPAM PRN MG: 2 INJECTION INTRAMUSCULAR; INTRAVENOUS at 17:40

## 2018-06-19 RX ADMIN — Medication PRN MG: at 02:31

## 2018-06-19 RX ADMIN — Medication SCH GM: at 09:46

## 2018-06-19 RX ADMIN — Medication PRN MG: at 23:15

## 2018-06-19 RX ADMIN — METRONIDAZOLE SCH MLS/HR: 500 INJECTION, SOLUTION INTRAVENOUS at 06:00

## 2018-06-19 RX ADMIN — LORAZEPAM PRN MG: 2 INJECTION INTRAMUSCULAR; INTRAVENOUS at 19:47

## 2018-06-19 RX ADMIN — Medication PRN MG: at 00:24

## 2018-06-20 VITALS — DIASTOLIC BLOOD PRESSURE: 74 MMHG | SYSTOLIC BLOOD PRESSURE: 96 MMHG

## 2018-06-20 VITALS — SYSTOLIC BLOOD PRESSURE: 127 MMHG | DIASTOLIC BLOOD PRESSURE: 89 MMHG

## 2018-06-20 VITALS — DIASTOLIC BLOOD PRESSURE: 70 MMHG | SYSTOLIC BLOOD PRESSURE: 101 MMHG

## 2018-06-20 VITALS — SYSTOLIC BLOOD PRESSURE: 87 MMHG | DIASTOLIC BLOOD PRESSURE: 55 MMHG

## 2018-06-20 VITALS — DIASTOLIC BLOOD PRESSURE: 82 MMHG | SYSTOLIC BLOOD PRESSURE: 111 MMHG

## 2018-06-20 VITALS — DIASTOLIC BLOOD PRESSURE: 80 MMHG | SYSTOLIC BLOOD PRESSURE: 124 MMHG

## 2018-06-20 RX ADMIN — LORAZEPAM PRN MG: 2 INJECTION INTRAMUSCULAR; INTRAVENOUS at 02:15

## 2018-06-20 RX ADMIN — Medication PRN MG: at 23:00

## 2018-06-20 RX ADMIN — LORAZEPAM PRN MG: 2 INJECTION INTRAMUSCULAR; INTRAVENOUS at 15:30

## 2018-06-20 RX ADMIN — Medication PRN MG: at 11:30

## 2018-06-20 RX ADMIN — Medication PRN MG: at 15:30

## 2018-06-20 RX ADMIN — LORAZEPAM PRN MG: 2 INJECTION INTRAMUSCULAR; INTRAVENOUS at 18:30

## 2018-06-20 RX ADMIN — Medication PRN MG: at 18:30

## 2018-06-20 RX ADMIN — LORAZEPAM PRN MG: 2 INJECTION INTRAMUSCULAR; INTRAVENOUS at 23:00

## 2018-06-20 RX ADMIN — Medication PRN MG: at 05:47

## 2018-06-21 VITALS — DIASTOLIC BLOOD PRESSURE: 37 MMHG | SYSTOLIC BLOOD PRESSURE: 53 MMHG

## 2018-06-21 VITALS — SYSTOLIC BLOOD PRESSURE: 99 MMHG | DIASTOLIC BLOOD PRESSURE: 56 MMHG

## 2018-06-21 VITALS — DIASTOLIC BLOOD PRESSURE: 64 MMHG | SYSTOLIC BLOOD PRESSURE: 100 MMHG

## 2018-06-21 VITALS — SYSTOLIC BLOOD PRESSURE: 73 MMHG | DIASTOLIC BLOOD PRESSURE: 61 MMHG

## 2018-06-21 VITALS — SYSTOLIC BLOOD PRESSURE: 67 MMHG | DIASTOLIC BLOOD PRESSURE: 49 MMHG

## 2018-06-21 RX ADMIN — Medication PRN MG: at 05:13

## 2018-06-21 RX ADMIN — LORAZEPAM PRN MG: 2 INJECTION INTRAMUSCULAR; INTRAVENOUS at 05:13

## 2018-06-21 RX ADMIN — LORAZEPAM PRN MG: 2 INJECTION INTRAMUSCULAR; INTRAVENOUS at 08:05

## 2018-06-21 RX ADMIN — Medication PRN MG: at 14:50

## 2018-06-21 RX ADMIN — Medication PRN MG: at 08:05

## 2018-06-21 RX ADMIN — Medication PRN MG: at 18:00

## 2018-06-21 RX ADMIN — LORAZEPAM PRN MG: 2 INJECTION INTRAMUSCULAR; INTRAVENOUS at 18:00

## 2018-07-26 NOTE — DISCHARGE SUMMARY
DEATH SUMMARY



CHIEF COMPLAINT:  Hypokalemia, small bowel obstruction.  



FINAL DIAGNOSIS:  Colorectal carcinoma with widespread metastasis.



PROCEDURES:  

1. Transfusion packed cells.

2. Exploratory laparotomy.

3. Central line placement.  



DISPOSITION:  [facility].  Patient did  as well 18.



A 47-year-old male with known history of colon CA status post total 

colectomy and ileostomy, brought to the ER with a day history of 

progressive abdominal distention, lack of stool formation in the ostomy 

bag.  No vomiting.  Has had abdominal pain.  Underwent review and 

evaluation in the emergency room.  Evaluating his abdomen revealed diffuse 

tenderness to palpation.  Bowel sounds were not present, and with further 

care patient was admitted to the facility with findings of generalized 

abdominal pain, small-bowel obstruction, dehydration, history of colon CA, 

ulcerative colitis.  Will be placing NPO.  Nasogastric tube placement will 

be made.  Begin IV fluids, analgesic management. 



With his admission regarding his CA history, it was being followed by Dr. Hardwick from a hematology-oncology standpoint; and with his review his 

impression was anemia, neutropenia, thrombocytopenia, peritoneal 

metastasis, partial bowel obstruction, hypokalemia, hypoalbuminemia, 

elevated liver functions.  Recommend continuing NG-tube.  Potassium 

supplement, begin TPN nutrition.  Recommending KRAS mutation.



Acute kidney injuries were being addressed by Dr. Dumont; and with his 

findings his impression was acute tubular necrosis, nonoliguric but appears 

fluid-overloaded.  CTA of chest shows evidence of bilateral infiltrates and 

possible bilateral pleural effusions.  Septic in shock state.  Metastatic 

colon carcinoma with intraperitoneal metastasis.  States that overall 

prognosis is extremely poor.  Patient is currently heavily sedated.



With admission the patient was on the med surg floor, and regarding the 

complaints upon admission he was being seen by Dr. Ortiz from a general 

surgery standpoint, and with his evaluation his assessment was small-bowel 

obstruction, partial versus complete.  Known to have abdominal 

carcinomatosis, stool obstruction could be malignant.  Plan is to maintain 

NPO status, hydrate the patient, to maintain NG-tube status, may need 

surgery if no improvement.



Was placed NPO, was started on Zosyn 3.375 IV q.8.  Being hydrated.  Given 

lorazepam nightly. Hydromorphone was being given 1 mg q.4 hours for pain 

management.



Initial labs were showing the potassium to be low at 2.8.  Kidney functions 

stable.  Glucose 104.  CBC:  Hemoglobin of 12.8, white cell count was only 

1900.  Was seen by Dr. Hardwick and was recommending treatment of Neupogen 

to increase the white cell count.  Was now being started on total 

parenteral nutrition 75 mL per hour.  White cell count was now at 3000.



Patient was resting comfortably, was in no acute distress.  Continued to be 

maintained on TPN.  Was on analgesics.  Continued to be followed by Dr. Ortiz, general surgery, regarding the obstruction status.



On , NG-tube was removed.  Patient was tolerating p.o. fluids well.  

Analgesics were being adjusted.  Was now up to a full liquid diet.  Efforts 

now were being made to wean off the TPN.  On 18 his white cell count 

was 7600.  Diet once weaned off TPN was addressed to a regular diet.  It 

was felt that his partial small-bowel obstruction was improving.  Began 

having difficulties with his diet on 6/10/18 now that he was weaned off the 

TPN and he was seen not to be able to tolerate diet very well, developing 

severe abdominal pain after eating.  CT of the abdomen was being repeated.  

Now on 18, due to his continued abdominal complaints and new CT 

findings, he was taken back to surgery on 18 by Dr. Ortiz.  The 

preoperative diagnosis was small-bowel obstruction.  Postoperative was same 

due to carcinomatosis.  Procedure was exploratory laparotomy, small-bowel 

resection, small-bowel bypass, and an ileostomy.  There were no 

complications.  Estimated blood loss was 150 mL.  It was noted during the 

procedure the patient had widespread metastasis.  Prognosis was noted to be 

grim.  Was in ICU post procedure, and postop he was developing respiratory 

failure.  Was now on respiratory support managed by Dr. Burk and Dr. Campbell and was on vent support.  Continued on a central line, IV fluids.  

Given conscious sedation while being vented.  X-rays were noted to have 

bilateral alveolar infiltrate, pleural effusion, circulatory shock.  

Continuing his ICU care, continuing intubated status and propofol drip.  

Kidney functions were now with a BUN 39 creatinine 2.03.  Was on 

vasopressors for BP support.  Continuing on Zosyn and Flagyl.  On  

patient maintained his vent status, was unresponsive, heavily sedated.  Now 

the patient was receiving cefepime as well as vancomycin.  His status was 

being maintained on vent support and vasopressors, Versed, fentanyl, 

propofol, and on  noted on comfort care, will be addressed.  Patient is 

a terminally ill patient.  Potassium had declined to 2.8.  Was started on 

potassium replenishment.  He was also being given magnesium replacement.  

On 6/15 his hemoglobin had trended down to 8.7.  Efforts were tried to wean 

the patient off sedation, but the patient was noted be agitated easily.  



He was continuing with his magnesium and his potassium replenishment.  On 

 his potassium was 3.1.  His hemoglobin dropped further to 7.5, now 

being typed and crossmatched for transfusion.  Hemoglobin continued to fall 

to 6.4.  White cell count fell to 1800.  Chest x-rays were showing evidence 

of persistent bibasilar atelectasis.  His hemoglobin following transfusion 

was 8.3.  BP continued to be supported by pressors.  Now running a 

temperature of 100.5.  Hemoglobin had dropped further down to 7.9.  Patient 

was now noted to be status post terminal extubation.  Hospice was being 

consulted.  On  noted to be extubated but unresponsive.  Still was 

running a temp, elevated pulse.  Was continued on comfort measures.  

Discussion was being made to move the patient to the floor, and on  he 

was on the med surg floor.  Noted to be following simple commands, somewhat 

responsive.  Awaiting hospice, and on  at 3 hours, ER physician was 

requested to see the patient.  Patient was found to have no pulse 

palpated.  There were no breath sounds or respirations.  Pupils were fixed 

and dilated.  Time of death was noted be at .  Made aware of the 

patient's demise by the nursing supervisor.  



Dictated By:  MIGUELANGEL Orr









DD:  2018 14:55

DT:  2018 16:17

Job#:  [id number]

## 2018-08-14 NOTE — CONSULTATION
DATE OF CONSULTATION:  June 01, 2018 



SURGICAL CONSULTATION 



REASON FOR CONSULTATION:  Small-bowel obstruction.



HPI:  This 47-year-old male was admitted to the hospital after presenting 

to the emergency room complaining of severe crampy abdominal pain, which 

had started within the last couple of days.  The patient has an ostomy, 

which had not been working for the last few hours.  Patient is well known 

to us from having had a previous exploratory laparotomy at which time he 

was found to have diffuse carcinomatosis of the abdominal cavity secondary 

to a colon cancer.  At that time, he underwent an ostomy and he has been 

undergoing chemotherapy ever since, and had been doing relatively well up 

until this episode.



PAST MEDICAL HISTORY:  Remarkable for ulcerative colitis and the previously 

described colon cancer.



ALLERGIES:  NONE.



REVIEW OF SYSTEMS:  Otherwise unremarkable.  



MEDICATIONS:  Please refer to the MAR.  



PHYSICAL EXAMINATION

GENERAL:  At time that we saw the patient revealed a male lying in bed in 

moderate distress complaining of some abdominal pain.  

VITALS:  He was afebrile. His vital signs were stable. 

HEENT:  Showed no acute inflammation. 

NECK:  No nodes, masses or bruits.  

LUNGS:  Clear to auscultation. 

HEART:  Regular rate and rhythm. 

ABDOMEN:  Soft with some mild tenderness.  It was mildly distended.  The 

patient's ostomy was not having very much output.  Digital examination did 

not reveal any obstruction of the ostomy. 

EXTREMITIES:  Good pulses bilaterally. 



ASSESSMENT:  Small-bowel obstruction, partial versus complete in a patient 

known to have abdominal carcinomatosis.  So, obstruction is very likely to 

be secondary to malignant spread of disease, although obviously adhesions 

would also be a consideration.



PLAN:  Will be n.p.o., hydration and NG tube.  If there is no improvement, 

the patient may need surgery.  



Thank you very much for asking me to see this patient.  



 





DD:  08/14/2018 18:29

DT:  08/14/2018 18:35

Job#:  [id number]

## 2018-08-14 NOTE — OPERATIVE REPORT
DATE OF PROCEDURE:  June 11, 2018 



PREOPERATIVE DIAGNOSIS:  Small bowel obstruction.



POSTOPERATIVE DIAGNOSIS:  Small bowel obstruction secondary to massive 

intra-abdominal carcinomatosis.



OPERATIONS PERFORMED:  

1. Exploratory laparotomy.

2. Extensive lysis of adhesions.

3. Small bowel resection times 2. 

4. Small bowel bypass times 2.  

5. Ileostomy.  



ASSISTANT:  ERROL Elena.



ANESTHESIA:  General.



COMPLICATIONS:  None.



ESTIMATED BLOOD LOSS:  150 mL.



DESCRIPTION OF PROCEDURE:  With the patient lying in bed in the supine 

position under good general endotracheal anesthesia, the abdomen was 

prepped with Betadine solution and draped in the usual manner.  A midline 

incision was made.  It was carried down through the subcutaneous tissue and 

through the midline fascia.  The peritoneum was opened and the abdomen was 

entered.  Upon entering the abdominal cavity, immediately extensive 

adhesions were encountered.  The patient was found to have extensive spread 

of cancer within the intra-abdominal cavity with multiple studdings 

throughout the entire small bowel that was present with multiple points of 

obstruction.  Particularly in the pelvis there was a large amount of tumor 

present with loops of small bowel stuck in the pelvis which made it 

impossible to remove all of the disease segment.  There was a couple of 

areas that had small pinpoint of obstruction and both of these were dealt 

by doing segmental resections with proximal and distal division of the 

bowel with a MELI 75 stapler, and then tying off the mesentery was 2-0 silk 

and the anastomosis was then performed with another application of MELI 

stapler and the remaining opening was closed with a TA60 stapler and the 

anastomosis was reinforced with 3-0 silk sutures.  Two separate areas were 

done in similar fashion.  There were 2 areas, however, that could not be 

resected because they were stuck going into the pelvis and we decided that 

the only thing that could be done would be to bypass this to a more distal 

area and so by passes were performed again with another application of MELI 

75 stapler in a side-to-side fashion with the remaining opening closed with 

a TA60 stapler and the bypasses were then reinforced with 3-0 silk.  The 

part of the ileum that was most distal and that was amenable bringing to 

the outside was then brought out as an ostomy which was matured with 3-0 

Vicryl sutures and the abdomen was then closed in layers.  The peritoneum 

was closed with a running suture of number 1 Vicryl.  The midline fascia 

was closed with a running suture of number 1 PDS and the skin was closed 

with clips.  Dressings were applied.  The sponge, lap and needle count was 

correct.  Patient tolerated the procedure well and returned to the recovery 

room in stable condition.



  









DD:  08/14/2018 18:33

DT:  08/14/2018 18:40

Job#:  [id number]